# Patient Record
Sex: MALE | Race: WHITE | NOT HISPANIC OR LATINO | Employment: OTHER | URBAN - NONMETROPOLITAN AREA
[De-identification: names, ages, dates, MRNs, and addresses within clinical notes are randomized per-mention and may not be internally consistent; named-entity substitution may affect disease eponyms.]

---

## 2017-10-26 ENCOUNTER — APPOINTMENT (OUTPATIENT)
Dept: CT IMAGING | Facility: HOSPITAL | Age: 82
End: 2017-10-26

## 2017-10-26 ENCOUNTER — HOSPITAL ENCOUNTER (EMERGENCY)
Facility: HOSPITAL | Age: 82
Discharge: HOME | End: 2017-10-26
Admitting: EMERGENCY MEDICINE

## 2017-10-26 VITALS
WEIGHT: 160 LBS | DIASTOLIC BLOOD PRESSURE: 68 MMHG | RESPIRATION RATE: 16 BRPM | OXYGEN SATURATION: 98 % | HEART RATE: 74 BPM | TEMPERATURE: 98 F | BODY MASS INDEX: 24.25 KG/M2 | SYSTOLIC BLOOD PRESSURE: 90 MMHG | HEIGHT: 68 IN

## 2017-10-26 DIAGNOSIS — N39.0 URINARY TRACT INFECTION WITH HEMATURIA, SITE UNSPECIFIED: Primary | ICD-10-CM

## 2017-10-26 DIAGNOSIS — R31.9 URINARY TRACT INFECTION WITH HEMATURIA, SITE UNSPECIFIED: Primary | ICD-10-CM

## 2017-10-26 DIAGNOSIS — K59.00 CONSTIPATION, UNSPECIFIED CONSTIPATION TYPE: ICD-10-CM

## 2017-10-26 LAB
ALBUMIN SERPL-MCNC: 3.5 G/DL (ref 3.5–5)
ALBUMIN/GLOB SERPL: 1.2 G/DL (ref 1.1–2.5)
ALP SERPL-CCNC: 69 U/L (ref 24–120)
ALT SERPL W P-5'-P-CCNC: 19 U/L (ref 0–54)
AMYLASE SERPL-CCNC: 31 U/L (ref 30–110)
ANION GAP SERPL CALCULATED.3IONS-SCNC: 13 MMOL/L (ref 4–13)
AST SERPL-CCNC: 17 U/L (ref 7–45)
BACTERIA UR QL AUTO: ABNORMAL /HPF
BASOPHILS # BLD AUTO: 0.02 10*3/MM3 (ref 0–0.2)
BASOPHILS NFR BLD AUTO: 0.2 % (ref 0–2)
BILIRUB SERPL-MCNC: 1.3 MG/DL (ref 0.1–1)
BILIRUB UR QL STRIP: NEGATIVE
BUN BLD-MCNC: 16 MG/DL (ref 5–21)
BUN/CREAT SERPL: 16.3 (ref 7–25)
CALCIUM SPEC-SCNC: 9.2 MG/DL (ref 8.4–10.4)
CHLORIDE SERPL-SCNC: 97 MMOL/L (ref 98–110)
CLARITY UR: CLEAR
CO2 SERPL-SCNC: 24 MMOL/L (ref 24–31)
COLOR UR: YELLOW
CREAT BLD-MCNC: 0.98 MG/DL (ref 0.5–1.4)
DEPRECATED RDW RBC AUTO: 41.4 FL (ref 40–54)
EOSINOPHIL # BLD AUTO: 0.01 10*3/MM3 (ref 0–0.7)
EOSINOPHIL NFR BLD AUTO: 0.1 % (ref 0–4)
ERYTHROCYTE [DISTWIDTH] IN BLOOD BY AUTOMATED COUNT: 13 % (ref 12–15)
GFR SERPL CREATININE-BSD FRML MDRD: 73 ML/MIN/1.73
GLOBULIN UR ELPH-MCNC: 2.9 GM/DL
GLUCOSE BLD-MCNC: 289 MG/DL (ref 70–100)
GLUCOSE UR STRIP-MCNC: ABNORMAL MG/DL
HCT VFR BLD AUTO: 36.5 % (ref 40–52)
HGB BLD-MCNC: 12.4 G/DL (ref 14–18)
HGB UR QL STRIP.AUTO: ABNORMAL
HYALINE CASTS UR QL AUTO: ABNORMAL /LPF
IMM GRANULOCYTES # BLD: 0.02 10*3/MM3 (ref 0–0.03)
IMM GRANULOCYTES NFR BLD: 0.2 % (ref 0–5)
KETONES UR QL STRIP: ABNORMAL
LEUKOCYTE ESTERASE UR QL STRIP.AUTO: ABNORMAL
LIPASE SERPL-CCNC: 27 U/L (ref 23–203)
LYMPHOCYTES # BLD AUTO: 1.06 10*3/MM3 (ref 0.72–4.86)
LYMPHOCYTES NFR BLD AUTO: 12.2 % (ref 15–45)
MCH RBC QN AUTO: 29.5 PG (ref 28–32)
MCHC RBC AUTO-ENTMCNC: 34 G/DL (ref 33–36)
MCV RBC AUTO: 86.7 FL (ref 82–95)
MONOCYTES # BLD AUTO: 0.84 10*3/MM3 (ref 0.19–1.3)
MONOCYTES NFR BLD AUTO: 9.7 % (ref 4–12)
NEUTROPHILS # BLD AUTO: 6.73 10*3/MM3 (ref 1.87–8.4)
NEUTROPHILS NFR BLD AUTO: 77.6 % (ref 39–78)
NITRITE UR QL STRIP: NEGATIVE
PH UR STRIP.AUTO: 6 [PH] (ref 5–8)
PLATELET # BLD AUTO: 150 10*3/MM3 (ref 130–400)
PMV BLD AUTO: 11.3 FL (ref 6–12)
POTASSIUM BLD-SCNC: 3 MMOL/L (ref 3.5–5.3)
PROT SERPL-MCNC: 6.4 G/DL (ref 6.3–8.7)
PROT UR QL STRIP: ABNORMAL
RBC # BLD AUTO: 4.21 10*6/MM3 (ref 4.8–5.9)
RBC # UR: ABNORMAL /HPF
REF LAB TEST METHOD: ABNORMAL
SODIUM BLD-SCNC: 134 MMOL/L (ref 135–145)
SP GR UR STRIP: 1.01 (ref 1–1.03)
SQUAMOUS #/AREA URNS HPF: ABNORMAL /HPF
UROBILINOGEN UR QL STRIP: ABNORMAL
WBC NRBC COR # BLD: 8.68 10*3/MM3 (ref 4.8–10.8)
WBC UR QL AUTO: ABNORMAL /HPF

## 2017-10-26 PROCEDURE — 96365 THER/PROPH/DIAG IV INF INIT: CPT

## 2017-10-26 PROCEDURE — 80053 COMPREHEN METABOLIC PANEL: CPT | Performed by: NURSE PRACTITIONER

## 2017-10-26 PROCEDURE — 87077 CULTURE AEROBIC IDENTIFY: CPT | Performed by: NURSE PRACTITIONER

## 2017-10-26 PROCEDURE — 87086 URINE CULTURE/COLONY COUNT: CPT | Performed by: NURSE PRACTITIONER

## 2017-10-26 PROCEDURE — 0 IOPAMIDOL 61 % SOLUTION: Performed by: NURSE PRACTITIONER

## 2017-10-26 PROCEDURE — 81001 URINALYSIS AUTO W/SCOPE: CPT | Performed by: NURSE PRACTITIONER

## 2017-10-26 PROCEDURE — 87186 SC STD MICRODIL/AGAR DIL: CPT | Performed by: NURSE PRACTITIONER

## 2017-10-26 PROCEDURE — 82150 ASSAY OF AMYLASE: CPT | Performed by: NURSE PRACTITIONER

## 2017-10-26 PROCEDURE — 74177 CT ABD & PELVIS W/CONTRAST: CPT

## 2017-10-26 PROCEDURE — 85025 COMPLETE CBC W/AUTO DIFF WBC: CPT | Performed by: NURSE PRACTITIONER

## 2017-10-26 PROCEDURE — 99284 EMERGENCY DEPT VISIT MOD MDM: CPT

## 2017-10-26 PROCEDURE — 25010000002 CEFTRIAXONE: Performed by: NURSE PRACTITIONER

## 2017-10-26 PROCEDURE — 87185 SC STD ENZYME DETCJ PER NZM: CPT | Performed by: NURSE PRACTITIONER

## 2017-10-26 PROCEDURE — 83690 ASSAY OF LIPASE: CPT | Performed by: NURSE PRACTITIONER

## 2017-10-26 PROCEDURE — 87147 CULTURE TYPE IMMUNOLOGIC: CPT | Performed by: NURSE PRACTITIONER

## 2017-10-26 RX ORDER — CIPROFLOXACIN 500 MG/1
500 TABLET, FILM COATED ORAL EVERY 12 HOURS
Qty: 10 TABLET | Refills: 0 | Status: SHIPPED | OUTPATIENT
Start: 2017-10-26 | End: 2017-10-31

## 2017-10-26 RX ORDER — POLYETHYLENE GLYCOL 3350 17 G/17G
17 POWDER, FOR SOLUTION ORAL DAILY PRN
Qty: 119 G | Refills: 0 | Status: SHIPPED | OUTPATIENT
Start: 2017-10-26

## 2017-10-26 RX ORDER — POTASSIUM CHLORIDE 750 MG/1
20 CAPSULE, EXTENDED RELEASE ORAL ONCE
Status: COMPLETED | OUTPATIENT
Start: 2017-10-26 | End: 2017-10-26

## 2017-10-26 RX ORDER — POTASSIUM CHLORIDE 20 MEQ/1
20 TABLET, EXTENDED RELEASE ORAL ONCE
Status: DISCONTINUED | OUTPATIENT
Start: 2017-10-26 | End: 2017-10-26 | Stop reason: CLARIF

## 2017-10-26 RX ADMIN — IOPAMIDOL 100 ML: 612 INJECTION, SOLUTION INTRAVENOUS at 16:19

## 2017-10-26 RX ADMIN — CEFTRIAXONE 1 G: 1 INJECTION, POWDER, FOR SOLUTION INTRAMUSCULAR; INTRAVENOUS at 17:13

## 2017-10-26 RX ADMIN — POTASSIUM CHLORIDE 20 MEQ: 750 CAPSULE, EXTENDED RELEASE ORAL at 17:11

## 2017-10-26 NOTE — ED NOTES
Per pts family pt was up complaining of abd pain early this morning. PT has not had a BM in 4 days per wife. During assessment pts abd is not tender to touch and pt has no complaints.      Alejandra Wu RN  10/26/17 8565

## 2017-10-26 NOTE — ED PROVIDER NOTES
Subjective   Patient is a 82 y.o. male presenting with abdominal pain.   Abdominal Pain   Pain location:  LLQ  Pain quality: sharp    Pain quality comment:  Pain intermittent for a few days; reports much worse pain last night, currently improved, hx of constipation  Pain severity:  Moderate  Onset quality:  Gradual  Timing:  Intermittent  Progression:  Improving  Chronicity:  New  Context: not recent illness, not retching, not sick contacts and not suspicious food intake    Relieved by:  None tried  Worsened by:  Nothing  Ineffective treatments:  None tried  Associated symptoms: constipation    Associated symptoms: no chills, no diarrhea, no dysuria, no fatigue, no fever, no flatus, no melena, no shortness of breath, no sore throat and no vomiting    Risk factors: being elderly    Risk factors: no recent hospitalization        Review of Systems   Constitutional: Negative for appetite change, chills, fatigue and fever.   HENT: Negative for congestion and sore throat.    Respiratory: Negative for chest tightness and shortness of breath.    Cardiovascular: Negative for palpitations.   Gastrointestinal: Positive for abdominal pain and constipation. Negative for abdominal distention, diarrhea, flatus, melena and vomiting.   Genitourinary: Negative for difficulty urinating and dysuria.   Musculoskeletal: Negative for back pain, joint swelling, neck pain and neck stiffness.   Skin: Negative for rash.   Neurological: Negative for dizziness and headaches.   All other systems reviewed and are negative.      Past Medical History:   Diagnosis Date   • Diabetes mellitus    • Hypertension    • Myocardial infarction    • Stroke        No Known Allergies    Past Surgical History:   Procedure Laterality Date   • BACK SURGERY     • CARDIAC DEFIBRILLATOR PLACEMENT         History reviewed. No pertinent family history.    Social History     Social History   • Marital status:      Spouse name: N/A   • Number of children: N/A   •  "Years of education: N/A     Social History Main Topics   • Smoking status: Never Smoker   • Smokeless tobacco: None   • Alcohol use No   • Drug use: No   • Sexual activity: Defer     Other Topics Concern   • None     Social History Narrative   • None       Prior to Admission medications    Medication Sig Start Date End Date Taking? Authorizing Provider   acetaminophen (TYLENOL) 500 MG tablet Every 6 (Six) Hours.    Historical Provider, MD   aspirin 81 MG EC tablet Take 1 tablet by mouth daily. 12/18/12   Historical Provider, MD   carvedilol (COREG) 6.25 MG tablet Take 1 tablet by mouth 2 times daily (with meals). 6/26/12   Historical Provider, MD   esomeprazole (NexIUM) 40 MG packet Take 40 mg by mouth daily. Takes it as needed prn.    Historical Provider, MD   furosemide (LASIX) 40 MG tablet Take 40 mg by mouth 3 times daily.      Historical Provider, MD   HYDROcodone-acetaminophen (VICODIN) 7.5-500 MG per tablet Every 6 (Six) Hours. 5/10/13   Historical Provider, MD   insulin NPH-insulin regular (HUMULIN 70/30) (70-30) 100 UNIT/ML injection Inject  into the skin 2 times daily.      Historical Provider, MD   nitroglycerin (NITROSTAT) 0.4 MG SL tablet Place 0.4 mg under the tongue every 5 minutes as needed.      Historical Provider, MD   potassium chloride (K-DUR,KLOR-CON) 20 MEQ CR tablet Take 20 mEq by mouth daily. 5/24/13   Historical Provider, MD       Medications   iopamidol (ISOVUE-300) 61 % injection 100 mL (100 mL Intravenous Given 10/26/17 1619)   cefTRIAXone (ROCEPHIN) 1 g/100 mL 0.9% NS (MBP) (0 g Intravenous Stopped 10/26/17 1801)   potassium chloride (MICRO-K) CR capsule 20 mEq (20 mEq Oral Given 10/26/17 1711)       BP 90/68  Pulse 74  Temp 98 °F (36.7 °C) (Oral)   Resp 16  Ht 68\" (172.7 cm)  Wt 160 lb (72.6 kg)  SpO2 98%  BMI 24.33 kg/m2      Objective   Physical Exam   Constitutional: He appears well-developed and well-nourished.   HENT:   Head: Normocephalic and atraumatic.   Right Ear: " External ear normal.   Left Ear: External ear normal.   Nose: Nose normal.   Mouth/Throat: Oropharynx is clear and moist.   Eyes: Conjunctivae and EOM are normal. Pupils are equal, round, and reactive to light.   Neck: Normal range of motion. Neck supple.   Cardiovascular: Normal rate, regular rhythm, normal heart sounds and intact distal pulses.    Pulmonary/Chest: Effort normal and breath sounds normal.   Abdominal: Soft. Bowel sounds are normal. There is tenderness.   Mild pain to palpation to the left lower quadrant without any guarding or rebound tenderness noted; bowel sounds noted to all 4 quadrants.    Musculoskeletal: Normal range of motion.   Neurological: He is alert.   Skin: Skin is warm and dry.   Psychiatric: He has a normal mood and affect. His behavior is normal. Judgment and thought content normal.   Nursing note and vitals reviewed.      Procedures         Lab Results (last 24 hours)     Procedure Component Value Units Date/Time    CBC & Differential [[id number]] Collected:  10/26/17 1500    Specimen:  Blood Updated:  10/26/17 1507    Narrative:       The following orders were created for panel order CBC & Differential.  Procedure                               Abnormality         Status                     ---------                               -----------         ------                     CBC Auto Differential[[id number]]         Abnormal            Final result                 Please view results for these tests on the individual orders.    Comprehensive Metabolic Panel [[id number]]  (Abnormal) Collected:  10/26/17 1500    Specimen:  Blood Updated:  10/26/17 1518     Glucose 289 (H) mg/dL      BUN 16 mg/dL      Creatinine 0.98 mg/dL      Sodium 134 (L) mmol/L      Potassium 3.0 (L) mmol/L      Chloride 97 (L) mmol/L      CO2 24.0 mmol/L      Calcium 9.2 mg/dL      Total Protein 6.4 g/dL      Albumin 3.50 g/dL      ALT (SGPT) 19 U/L      AST (SGOT) 17 U/L      Alkaline Phosphatase 69 U/L      Total  Bilirubin 1.3 (H) mg/dL      eGFR Non African Amer 73 mL/min/1.73      Globulin 2.9 gm/dL      A/G Ratio 1.2 g/dL      BUN/Creatinine Ratio 16.3     Anion Gap 13.0 mmol/L     Narrative:       The MDRD GFR formula is only valid for adults with stable renal function between ages 18 and 70.    Lipase [[id number]]  (Normal) Collected:  10/26/17 1500    Specimen:  Blood Updated:  10/26/17 1518     Lipase 27 U/L     Amylase [[id number]]  (Normal) Collected:  10/26/17 1500    Specimen:  Blood Updated:  10/26/17 1518     Amylase 31 U/L     CBC Auto Differential [[id number]]  (Abnormal) Collected:  10/26/17 1500    Specimen:  Blood Updated:  10/26/17 1507     WBC 8.68 10*3/mm3      RBC 4.21 (L) 10*6/mm3      Hemoglobin 12.4 (L) g/dL      Hematocrit 36.5 (L) %      MCV 86.7 fL      MCH 29.5 pg      MCHC 34.0 g/dL      RDW 13.0 %      RDW-SD 41.4 fl      MPV 11.3 fL      Platelets 150 10*3/mm3      Neutrophil % 77.6 %      Lymphocyte % 12.2 (L) %      Monocyte % 9.7 %      Eosinophil % 0.1 %      Basophil % 0.2 %      Immature Grans % 0.2 %      Neutrophils, Absolute 6.73 10*3/mm3      Lymphocytes, Absolute 1.06 10*3/mm3      Monocytes, Absolute 0.84 10*3/mm3      Eosinophils, Absolute 0.01 10*3/mm3      Basophils, Absolute 0.02 10*3/mm3      Immature Grans, Absolute 0.02 10*3/mm3     Urinalysis With / Culture If Indicated - Urine, Clean Catch [[id number]]  (Abnormal) Collected:  10/26/17 1605    Specimen:  Urine from Urine, Clean Catch Updated:  10/26/17 1622     Color, UA Yellow     Appearance, UA Clear     pH, UA 6.0     Specific Gravity, UA 1.013     Glucose,  mg/dL (2+) (A)     Ketones, UA 15 mg/dL (1+) (A)     Bilirubin, UA Negative     Blood, UA Small (1+) (A)     Protein, UA 30 mg/dL (1+) (A)     Leuk Esterase, UA Moderate (2+) (A)     Nitrite, UA Negative     Urobilinogen, UA 2.0 E.U./dL (A)    Urine Culture - Urine, Urine, Clean Catch [[id number]] Collected:  10/26/17 1605    Specimen:  Urine from Urine, Clean Catch  Updated:  10/26/17 1622    Urinalysis, Microscopic Only - Urine, Clean Catch [[id number]]  (Abnormal) Collected:  10/26/17 1605    Specimen:  Urine from Urine, Clean Catch Updated:  10/26/17 1630     RBC, UA 3-5 (A) /HPF      WBC, UA Too Numerous to Count (A) /HPF      Bacteria, UA None Seen /HPF      Squamous Epithelial Cells, UA 0-2 /HPF      Hyaline Casts, UA None Seen /LPF      Methodology Automated Microscopy          CT Abdomen Pelvis With Contrast   Final Result   1. Diffuse urinary bladder wall thickening could be due to cystitis.   2. Moderate to marked atherosclerosis in the aorta and iliac arteries.   3. Cardiomegaly.   This report was finalized on 10/26/2017 16:48 by Dr. Kaden Casanova MD.            ED Course  ED Course   Comment By Time   Wife states Dr. Moscoso called in prescription for potassium and she plans to pick the medication up tomorrow. Hope Lucero, APRN 10/26 1711   Patient was administered antibiotics as well as kdur po. We prescribed antibiotics as well as miralax for complaints of constipation. He has a prescription at the pharmacy for potassium not yet picked up. Patient will need to f/u with pcp and/or return with any acute or worsneing sxs. José Antonio has had no vomiting or fevers with the complaints of abdominal discomfort.        MDM  Number of Diagnoses or Management Options  Constipation, unspecified constipation type: new and requires workup  Urinary tract infection with hematuria, site unspecified: new and requires workup     Amount and/or Complexity of Data Reviewed  Clinical lab tests: ordered and reviewed  Tests in the radiology section of CPT®: ordered and reviewed  Obtain history from someone other than the patient: yes  Discuss the patient with other providers: yes    Risk of Complications, Morbidity, and/or Mortality  Presenting problems: moderate  Diagnostic procedures: moderate  Management options: moderate    Patient Progress  Patient progress: improved      Final  diagnoses:   Urinary tract infection with hematuria, site unspecified   Constipation, unspecified constipation type          Hope Lucero, APRN  10/26/17 0926

## 2017-10-29 LAB
BACTERIA SPEC AEROBE CULT: ABNORMAL
BACTERIA SPEC AEROBE CULT: ABNORMAL

## 2018-01-01 ENCOUNTER — TELEPHONE (OUTPATIENT)
Dept: INTERNAL MEDICINE CLINIC | Age: 83
End: 2018-01-01

## 2018-03-09 ENCOUNTER — APPOINTMENT (OUTPATIENT)
Dept: CT IMAGING | Facility: HOSPITAL | Age: 83
End: 2018-03-09

## 2018-03-09 ENCOUNTER — APPOINTMENT (OUTPATIENT)
Dept: GENERAL RADIOLOGY | Facility: HOSPITAL | Age: 83
End: 2018-03-09

## 2018-03-09 ENCOUNTER — HOSPITAL ENCOUNTER (EMERGENCY)
Facility: HOSPITAL | Age: 83
Discharge: HOME | End: 2018-03-10
Attending: EMERGENCY MEDICINE | Admitting: EMERGENCY MEDICINE
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
## 2018-03-09 LAB
ANION GAP SERPL CALCULATED.3IONS-SCNC: 15 MMOL/L (ref 4–13)
APTT PPP: 25.8 SECONDS (ref 24.1–34.8)
BASOPHILS # BLD AUTO: 0.04 10*3/MM3 (ref 0–0.2)
BASOPHILS NFR BLD AUTO: 0.6 % (ref 0–2)
BILIRUB UR QL STRIP: NEGATIVE
BUN BLD-MCNC: 17 MG/DL (ref 5–21)
BUN/CREAT SERPL: 21.8 (ref 7–25)
CALCIUM SPEC-SCNC: 9.7 MG/DL (ref 8.4–10.4)
CHLORIDE SERPL-SCNC: 95 MMOL/L (ref 98–110)
CK MB SERPL-CCNC: 1.48 NG/ML (ref 0–5)
CLARITY UR: CLEAR
CO2 SERPL-SCNC: 25 MMOL/L (ref 24–31)
COLOR UR: YELLOW
CREAT BLD-MCNC: 0.78 MG/DL (ref 0.5–1.4)
DEPRECATED RDW RBC AUTO: 39.4 FL (ref 40–54)
EOSINOPHIL # BLD AUTO: 0.05 10*3/MM3 (ref 0–0.7)
EOSINOPHIL NFR BLD AUTO: 0.7 % (ref 0–4)
ERYTHROCYTE [DISTWIDTH] IN BLOOD BY AUTOMATED COUNT: 12.8 % (ref 12–15)
GFR SERPL CREATININE-BSD FRML MDRD: 96 ML/MIN/1.73
GLUCOSE BLD-MCNC: 374 MG/DL (ref 70–100)
GLUCOSE UR STRIP-MCNC: ABNORMAL MG/DL
HCT VFR BLD AUTO: 38 % (ref 40–52)
HGB BLD-MCNC: 13.1 G/DL (ref 14–18)
HGB UR QL STRIP.AUTO: NEGATIVE
IMM GRANULOCYTES # BLD: 0.02 10*3/MM3 (ref 0–0.03)
IMM GRANULOCYTES NFR BLD: 0.3 % (ref 0–5)
INR PPP: 1.04 (ref 0.91–1.09)
KETONES UR QL STRIP: ABNORMAL
LEUKOCYTE ESTERASE UR QL STRIP.AUTO: NEGATIVE
LYMPHOCYTES # BLD AUTO: 1.79 10*3/MM3 (ref 0.72–4.86)
LYMPHOCYTES NFR BLD AUTO: 26 % (ref 15–45)
MAGNESIUM SERPL-MCNC: 1.8 MG/DL (ref 1.4–2.2)
MCH RBC QN AUTO: 29.4 PG (ref 28–32)
MCHC RBC AUTO-ENTMCNC: 34.5 G/DL (ref 33–36)
MCV RBC AUTO: 85.2 FL (ref 82–95)
MONOCYTES # BLD AUTO: 0.44 10*3/MM3 (ref 0.19–1.3)
MONOCYTES NFR BLD AUTO: 6.4 % (ref 4–12)
NEUTROPHILS # BLD AUTO: 4.54 10*3/MM3 (ref 1.87–8.4)
NEUTROPHILS NFR BLD AUTO: 66 % (ref 39–78)
NITRITE UR QL STRIP: NEGATIVE
NRBC BLD MANUAL-RTO: 0 /100 WBC (ref 0–0)
NT-PROBNP SERPL-MCNC: 2570 PG/ML (ref 0–1800)
PH UR STRIP.AUTO: 5.5 [PH] (ref 5–8)
PHOSPHATE SERPL-MCNC: 3.6 MG/DL (ref 2.5–4.5)
PLATELET # BLD AUTO: 167 10*3/MM3 (ref 130–400)
PMV BLD AUTO: 10.7 FL (ref 6–12)
POTASSIUM BLD-SCNC: 3.5 MMOL/L (ref 3.5–5.3)
PROT UR QL STRIP: NEGATIVE
PROTHROMBIN TIME: 13.9 SECONDS (ref 11.9–14.6)
RBC # BLD AUTO: 4.46 10*6/MM3 (ref 4.8–5.9)
SODIUM BLD-SCNC: 135 MMOL/L (ref 135–145)
SP GR UR STRIP: >1.03 (ref 1–1.03)
TROPONIN I SERPL-MCNC: 0.05 NG/ML (ref 0–0.03)
UROBILINOGEN UR QL STRIP: ABNORMAL
WBC NRBC COR # BLD: 6.88 10*3/MM3 (ref 4.8–10.8)

## 2018-03-09 PROCEDURE — 85730 THROMBOPLASTIN TIME PARTIAL: CPT | Performed by: EMERGENCY MEDICINE

## 2018-03-09 PROCEDURE — 83735 ASSAY OF MAGNESIUM: CPT | Performed by: EMERGENCY MEDICINE

## 2018-03-09 PROCEDURE — 81003 URINALYSIS AUTO W/O SCOPE: CPT | Performed by: EMERGENCY MEDICINE

## 2018-03-09 PROCEDURE — 85025 COMPLETE CBC W/AUTO DIFF WBC: CPT | Performed by: EMERGENCY MEDICINE

## 2018-03-09 PROCEDURE — 99283 EMERGENCY DEPT VISIT LOW MDM: CPT

## 2018-03-09 PROCEDURE — 82553 CREATINE MB FRACTION: CPT | Performed by: EMERGENCY MEDICINE

## 2018-03-09 PROCEDURE — 71046 X-RAY EXAM CHEST 2 VIEWS: CPT

## 2018-03-09 PROCEDURE — 70450 CT HEAD/BRAIN W/O DYE: CPT

## 2018-03-09 PROCEDURE — 93005 ELECTROCARDIOGRAM TRACING: CPT | Performed by: EMERGENCY MEDICINE

## 2018-03-09 PROCEDURE — 80048 BASIC METABOLIC PNL TOTAL CA: CPT | Performed by: EMERGENCY MEDICINE

## 2018-03-09 PROCEDURE — 84484 ASSAY OF TROPONIN QUANT: CPT | Performed by: EMERGENCY MEDICINE

## 2018-03-09 PROCEDURE — 83880 ASSAY OF NATRIURETIC PEPTIDE: CPT | Performed by: EMERGENCY MEDICINE

## 2018-03-09 PROCEDURE — 73562 X-RAY EXAM OF KNEE 3: CPT

## 2018-03-09 PROCEDURE — 85610 PROTHROMBIN TIME: CPT | Performed by: EMERGENCY MEDICINE

## 2018-03-09 PROCEDURE — 73030 X-RAY EXAM OF SHOULDER: CPT

## 2018-03-09 PROCEDURE — 84100 ASSAY OF PHOSPHORUS: CPT | Performed by: EMERGENCY MEDICINE

## 2018-03-09 NOTE — ED NOTES
"Pt to ER for recent multiple falls.  Pt fell last week injuring right knee.  Pt fell 2 days ago hitting defib site.  Pt denies any pain presently, pt was painful at time of fall.  Pt states \"I just fall.\"      Jeniffer Stein RN  03/09/18 1532    "

## 2018-03-10 VITALS
HEART RATE: 71 BPM | BODY MASS INDEX: 22.4 KG/M2 | TEMPERATURE: 97.5 F | DIASTOLIC BLOOD PRESSURE: 61 MMHG | RESPIRATION RATE: 17 BRPM | HEIGHT: 71 IN | SYSTOLIC BLOOD PRESSURE: 165 MMHG | OXYGEN SATURATION: 100 % | WEIGHT: 160 LBS

## 2018-03-10 LAB — TROPONIN I SERPL-MCNC: 0.05 NG/ML (ref 0–0.03)

## 2018-03-10 PROCEDURE — 93005 ELECTROCARDIOGRAM TRACING: CPT | Performed by: EMERGENCY MEDICINE

## 2018-03-10 PROCEDURE — 93010 ELECTROCARDIOGRAM REPORT: CPT | Performed by: INTERNAL MEDICINE

## 2018-03-10 PROCEDURE — 84484 ASSAY OF TROPONIN QUANT: CPT | Performed by: EMERGENCY MEDICINE

## 2018-03-10 NOTE — ED NOTES
See paper charting for further information due to computer down time and upgrade     Carli Celeste RN  03/10/18 0051

## 2018-03-10 NOTE — ED PROVIDER NOTES
"Subjective   83 y/o demented male who arrives with family for evaluation of falls. State patient has had 4-5 falls over the past month for which they have not seen their PMD for. State he had [employer] defib implanted in 2006 and that he will often place his hand over this area over the past 2-3 months and family arrive asking that \"this get checked out\" as they are not sure \"if it is shocking him because they reduced the shock so it wouldn't knock him over.\" They do state they do plug in the device nightly and have not been called by the company for any shocks. They note that the falls are without LOC but he has struck his head. They state further the patient is acting his normal self and deny again any weakness, fevers, vomiting or medication changes. The family endorses that they are not concerned about the falls as he has fallen now for several months but are concerned as he seems to, some times, hold his right hand over his pacemaker and the family is concerned that it may be discharging. They state further that the falls appear to be when his legs give out and deny he is unsteady or has multiple falls each week. He does use a walker and his elderly wife also assist him as well. The patient arrives alert but not oriented which his baseline for the family. He denies all issues to me but family endorses they would like his knee xrayed as well.       Patient is a 82 y.o. male presenting with fall.   Fall   Associated symptoms: no headaches        Review of Systems   Unable to perform ROS: Dementia   Neurological: Negative for dizziness, weakness, light-headedness and headaches.   All other systems reviewed and are negative.      Past Medical History:   Diagnosis Date   • Diabetes mellitus    • Hypertension    • Myocardial infarction    • Stroke        No Known Allergies    Past Surgical History:   Procedure Laterality Date   • BACK SURGERY     • CARDIAC DEFIBRILLATOR PLACEMENT         History reviewed. No " pertinent family history.    Social History     Social History   • Marital status:      Social History Main Topics   • Smoking status: Never Smoker   • Alcohol use No   • Drug use: No   • Sexual activity: Defer     Other Topics Concern   • Not on file           Objective   Physical Exam   Constitutional: He appears well-developed.   HENT:   Head: Normocephalic.   Nose: Nose normal.   Eyes: Conjunctivae are normal. Pupils are equal, round, and reactive to light.   Neck: Normal range of motion. Neck supple.   Cardiovascular: Normal rate, regular rhythm, normal heart sounds and intact distal pulses.    Pulmonary/Chest: Effort normal and breath sounds normal.   Abdominal: Soft. Bowel sounds are normal.   Musculoskeletal: Normal range of motion. He exhibits no edema or tenderness.   No effusions, no abrasions, no signs of trauma, raphael is negative, varus and valgus straining are normal, no laxity is noted.     Pelvis is stable, no pain on palpation of all extremities, rom is intact in all extremities.    Neurological: He is alert. He displays normal reflexes. No cranial nerve deficit. Coordination normal.   Skin: Skin is warm.   Vitals reviewed.      ECG 12 Lead    Date/Time: 3/9/2018 6:03 AM  Performed by: KEYONNA BANGURA  Authorized by: KEYONNA BANGURA   Rhythm: paced  BPM: 64      ECG 12 Lead    Date/Time: 3/10/2018 6:03 AM  Performed by: KEYONNA BANGURA  Authorized by: KEYONNA BANGURA   Rhythm: paced  BPM: 69                 ED Course  ED Course      SCANNED - IMAGING   Final Result      CT Head Without Contrast   Final Result   Changes of aging with no acute intracranial abnormality   This report was finalized on 03/10/2018 07:00 by Dr. James Gilbert MD.      XR Shoulder 2+ View Right   ED Interpretation   No acute fracture      Final Result      XR Chest 2 View   Final Result   . No acute disease.   This report was finalized on 03/09/2018 20:46 by Dr. Yovany Blake MD.      XR  Knee 3 View Right   Final Result   1.. Calcification of the medial and lateral menisci. The joint space is   otherwise well preserved.   2. No fracture or joint effusion.   This report was finalized on 03/09/2018 20:46 by Dr. Yovany Blake MD.        Labs Reviewed   BASIC METABOLIC PANEL - Abnormal; Notable for the following:        Result Value    Glucose 374 (*)     Chloride 95 (*)     Anion Gap 15.0 (*)     All other components within normal limits    Narrative:     The MDRD GFR formula is only valid for adults with stable renal function between ages 18 and 70.   BNP (IN-HOUSE) - Abnormal; Notable for the following:     proBNP 2,570.0 (*)     All other components within normal limits   URINALYSIS W/ MICROSCOPIC IF INDICATED - Abnormal; Notable for the following:     Specific Gravity, UA >1.030 (*)     Glucose, UA >=1000 mg/dL (3+) (*)     Ketones, UA 80 mg/dL (3+) (*)     All other components within normal limits    Narrative:     Urine microscopic not indicated.   TROPONIN (IN-HOUSE) - Abnormal; Notable for the following:     Troponin I 0.047 (*)     All other components within normal limits   CBC WITH AUTO DIFFERENTIAL - Abnormal; Notable for the following:     RBC 4.46 (*)     Hemoglobin 13.1 (*)     Hematocrit 38.0 (*)     RDW-SD 39.4 (*)     All other components within normal limits   TROPONIN (IN-HOUSE) - Abnormal; Notable for the following:     Troponin I 0.046 (*)     All other components within normal limits   PROTIME-INR - Normal   APTT - Normal   CK MB - Normal    Narrative:     CKMB Index not indicated   PHOSPHORUS - Normal   MAGNESIUM - Normal   CBC AND DIFFERENTIAL    Narrative:     The following orders were created for panel order CBC & Differential.  Procedure                               Abnormality         Status                     ---------                               -----------         ------                     CBC Auto Differential[[id number]]        Abnormal            Final result                  Please view results for these tests on the individual orders.                 MDM  Number of Diagnoses or Management Options  Diagnosis management comments: SCANNED - IMAGING   Final Result     CT Head Without Contrast   Final Result    Changes of aging with no acute intracranial abnormality    This report was finalized on 03/10/2018 07:00 by Dr. James Gilbert MD.     XR Shoulder 2+ View Right   ED Interpretation    No acute fracture      Final Result     XR Chest 2 View   Final Result    . No acute disease.    This report was finalized on 03/09/2018 20:46 by Dr. Yovany Blake MD.     XR Knee 3 View Right   Final Result    1.. Calcification of the medial and lateral menisci. The joint space is    otherwise well preserved.    2. No fracture or joint effusion.    This report was finalized on 03/09/2018 20:46 by Dr. Yovany Blake Md.  Labs Reviewed  BASIC METABOLIC PANEL - Abnormal; Notable for the following:      Glucose                       374 (*)                Chloride                      95 (*)                 Anion Gap                     15.0 (*)            All other components within normal limits         Narrative: The MDRD GFR formula is only valid for adults with stable renal function between ages 18 and 70.  BNP (IN-HOUSE) - Abnormal; Notable for the following:      proBNP                        2,570.0 (*)            All other components within normal limits  URINALYSIS W/ MICROSCOPIC IF INDICATED - Abnormal; Notable for the following:      Specific Gravity, UA          >1.030 (*)               Glucose, UA                     (*)                  Ketones, UA                     (*)               All other components within normal limits         Narrative: Urine microscopic not indicated.  TROPONIN (IN-HOUSE) - Abnormal; Notable for the following:      Troponin I                    0.047 (*)            All other components within normal limits  CBC WITH AUTO DIFFERENTIAL -  "Abnormal; Notable for the following:      RBC                           4.46 (*)               Hemoglobin                    13.1 (*)               Hematocrit                    38.0 (*)               RDW-SD                        39.4 (*)            All other components within normal limits  TROPONIN (IN-HOUSE) - Abnormal; Notable for the following:      Troponin I                    0.046 (*)            All other components within normal limits  PROTIME-INR - Normal  APTT - Normal  CK MB - Normal         Narrative: CKMB Index not indicated  PHOSPHORUS - Normal  MAGNESIUM - Normal  CBC AND DIFFERENTIAL      Delay in discharge as computer system went down and we were unable to send the interrogation report to [employer]. We attempted to contact them several times without success. Thus, I did check his CE and EKG x2 which were unchanged. His trop appears to always be elevated and did not change significantly during his stay. I offered the family admission vs dc at that time but they elected to go home stating they would contact [facility] In the morning. I do not feel the patient was being shocked but a history from him secondary to his dementia was very difficult. I did endorse to the patient and the family my concern for his falls. I am concerned that his falls at home with his elderly wife may lead to injury in the future. I have asked that they speak to his pmd about either home health, PT or further options such as placement. The family did not wish to talk about placement and state they feel the patient and his wife are \"safe\"at home.\"             Amount and/or Complexity of Data Reviewed  Clinical lab tests: ordered and reviewed  Tests in the radiology section of CPT®: ordered and reviewed  Obtain history from someone other than the patient: yes  Review and summarize past medical records: yes  Independent visualization of images, tracings, or specimens: yes        Final diagnoses:   Fall, initial encounter "            Wenceslao Barahona MD  03/12/18 0605

## 2018-03-10 NOTE — ED NOTES
Sending interrogation information has been attempted several more times with fail message each time. Family and MD made aware. Patient given a sandwich and drink with Dr Barahona's permission. MD states no  Dysphagia study needed     Carli Celeste RN  03/09/18 9300

## 2018-03-10 NOTE — ED NOTES
"Phone call returned by [employer]i, technician states we may need to attempted to send information up to 7 times before she receives it. Informed technician we have attempted to send information multiple times without success. She instructed us to \"keep trying\"       Deirdre Caruso RN  03/09/18 6991    "

## 2018-03-10 NOTE — ED NOTES
[employer] called regarding medical Device, awaiting call back from technician     Deirdre Caruso, RN  03/09/18 9130

## 2018-04-11 ENCOUNTER — HOSPITAL ENCOUNTER (INPATIENT)
Facility: HOSPITAL | Age: 83
LOS: 7 days | Discharge: NURSING, REHAB OR LONG-TERM CARE | End: 2018-04-18
Attending: EMERGENCY MEDICINE | Admitting: FAMILY MEDICINE

## 2018-04-11 ENCOUNTER — APPOINTMENT (OUTPATIENT)
Dept: GENERAL RADIOLOGY | Facility: HOSPITAL | Age: 83
End: 2018-04-11

## 2018-04-11 ENCOUNTER — APPOINTMENT (OUTPATIENT)
Dept: CT IMAGING | Facility: HOSPITAL | Age: 83
End: 2018-04-11

## 2018-04-11 DIAGNOSIS — Z74.09 IMPAIRED FUNCTIONAL MOBILITY, BALANCE, GAIT, AND ENDURANCE: ICD-10-CM

## 2018-04-11 DIAGNOSIS — Z78.9 IMPAIRED MOBILITY AND ADLS: ICD-10-CM

## 2018-04-11 DIAGNOSIS — Z74.09 IMPAIRED MOBILITY AND ADLS: ICD-10-CM

## 2018-04-11 DIAGNOSIS — R13.12 OROPHARYNGEAL DYSPHAGIA: ICD-10-CM

## 2018-04-11 DIAGNOSIS — E86.0 DEHYDRATION: Primary | ICD-10-CM

## 2018-04-11 DIAGNOSIS — I95.9 HYPOTENSION, UNSPECIFIED HYPOTENSION TYPE: ICD-10-CM

## 2018-04-11 PROBLEM — J18.9 HEALTHCARE-ASSOCIATED PNEUMONIA: Status: ACTIVE | Noted: 2018-04-11

## 2018-04-11 LAB
ALBUMIN SERPL-MCNC: 3.3 G/DL (ref 3.5–5)
ALBUMIN/GLOB SERPL: 1.1 G/DL (ref 1.1–2.5)
ALP SERPL-CCNC: 70 U/L (ref 24–120)
ALT SERPL W P-5'-P-CCNC: 17 U/L (ref 0–54)
ANION GAP SERPL CALCULATED.3IONS-SCNC: 6 MMOL/L (ref 4–13)
AST SERPL-CCNC: 25 U/L (ref 7–45)
BASOPHILS # BLD AUTO: 0.05 10*3/MM3 (ref 0–0.2)
BASOPHILS NFR BLD AUTO: 0.5 % (ref 0–2)
BILIRUB SERPL-MCNC: 0.7 MG/DL (ref 0.1–1)
BILIRUB UR QL STRIP: ABNORMAL
BUN BLD-MCNC: 27 MG/DL (ref 5–21)
BUN/CREAT SERPL: 23.5 (ref 7–25)
CALCIUM SPEC-SCNC: 9.2 MG/DL (ref 8.4–10.4)
CHLORIDE SERPL-SCNC: 98 MMOL/L (ref 98–110)
CLARITY UR: CLEAR
CO2 SERPL-SCNC: 31 MMOL/L (ref 24–31)
COLOR UR: ABNORMAL
CREAT BLD-MCNC: 1.15 MG/DL (ref 0.5–1.4)
D-LACTATE SERPL-SCNC: 1.4 MMOL/L (ref 0.5–2)
DEPRECATED RDW RBC AUTO: 46.9 FL (ref 40–54)
EOSINOPHIL # BLD AUTO: 0.07 10*3/MM3 (ref 0–0.7)
EOSINOPHIL NFR BLD AUTO: 0.7 % (ref 0–4)
ERYTHROCYTE [DISTWIDTH] IN BLOOD BY AUTOMATED COUNT: 13.9 % (ref 12–15)
GFR SERPL CREATININE-BSD FRML MDRD: 61 ML/MIN/1.73
GLOBULIN UR ELPH-MCNC: 2.9 GM/DL
GLUCOSE BLD-MCNC: 113 MG/DL (ref 70–100)
GLUCOSE BLDC GLUCOMTR-MCNC: 105 MG/DL (ref 70–130)
GLUCOSE BLDC GLUCOMTR-MCNC: 235 MG/DL (ref 70–130)
GLUCOSE UR STRIP-MCNC: ABNORMAL MG/DL
HBA1C MFR BLD: 12.2 %
HCT VFR BLD AUTO: 33 % (ref 40–52)
HGB BLD-MCNC: 10.6 G/DL (ref 14–18)
HGB UR QL STRIP.AUTO: NEGATIVE
IMM GRANULOCYTES # BLD: 0.09 10*3/MM3 (ref 0–0.03)
IMM GRANULOCYTES NFR BLD: 0.8 % (ref 0–5)
KETONES UR QL STRIP: ABNORMAL
L PNEUMO1 AG UR QL IA: NEGATIVE
LEUKOCYTE ESTERASE UR QL STRIP.AUTO: NEGATIVE
LYMPHOCYTES # BLD AUTO: 1.11 10*3/MM3 (ref 0.72–4.86)
LYMPHOCYTES NFR BLD AUTO: 10.3 % (ref 15–45)
MCH RBC QN AUTO: 29.8 PG (ref 28–32)
MCHC RBC AUTO-ENTMCNC: 32.1 G/DL (ref 33–36)
MCV RBC AUTO: 92.7 FL (ref 82–95)
MONOCYTES # BLD AUTO: 0.71 10*3/MM3 (ref 0.19–1.3)
MONOCYTES NFR BLD AUTO: 6.6 % (ref 4–12)
NEUTROPHILS # BLD AUTO: 8.73 10*3/MM3 (ref 1.87–8.4)
NEUTROPHILS NFR BLD AUTO: 81.1 % (ref 39–78)
NITRITE UR QL STRIP: NEGATIVE
NRBC BLD MANUAL-RTO: 0 /100 WBC (ref 0–0)
PH UR STRIP.AUTO: <=5 [PH] (ref 5–8)
PLATELET # BLD AUTO: 210 10*3/MM3 (ref 130–400)
PMV BLD AUTO: 10.6 FL (ref 6–12)
POTASSIUM BLD-SCNC: 4.3 MMOL/L (ref 3.5–5.3)
PROT SERPL-MCNC: 6.2 G/DL (ref 6.3–8.7)
PROT UR QL STRIP: ABNORMAL
RBC # BLD AUTO: 3.56 10*6/MM3 (ref 4.8–5.9)
S PNEUM AG SPEC QL LA: NEGATIVE
SODIUM BLD-SCNC: 135 MMOL/L (ref 135–145)
SP GR UR STRIP: >1.03 (ref 1–1.03)
UROBILINOGEN UR QL STRIP: ABNORMAL
WBC NRBC COR # BLD: 10.76 10*3/MM3 (ref 4.8–10.8)

## 2018-04-11 PROCEDURE — 73630 X-RAY EXAM OF FOOT: CPT

## 2018-04-11 PROCEDURE — 99284 EMERGENCY DEPT VISIT MOD MDM: CPT

## 2018-04-11 PROCEDURE — 74176 CT ABD & PELVIS W/O CONTRAST: CPT

## 2018-04-11 PROCEDURE — 94799 UNLISTED PULMONARY SVC/PX: CPT

## 2018-04-11 PROCEDURE — 25010000002 PIPERACILLIN SOD-TAZOBACTAM PER 1 G: Performed by: NURSE PRACTITIONER

## 2018-04-11 PROCEDURE — 81003 URINALYSIS AUTO W/O SCOPE: CPT | Performed by: EMERGENCY MEDICINE

## 2018-04-11 PROCEDURE — 25010000002 ENOXAPARIN PER 10 MG: Performed by: NURSE PRACTITIONER

## 2018-04-11 PROCEDURE — 82962 GLUCOSE BLOOD TEST: CPT

## 2018-04-11 PROCEDURE — 93005 ELECTROCARDIOGRAM TRACING: CPT | Performed by: EMERGENCY MEDICINE

## 2018-04-11 PROCEDURE — 83036 HEMOGLOBIN GLYCOSYLATED A1C: CPT | Performed by: NURSE PRACTITIONER

## 2018-04-11 PROCEDURE — 94640 AIRWAY INHALATION TREATMENT: CPT

## 2018-04-11 PROCEDURE — 72131 CT LUMBAR SPINE W/O DYE: CPT

## 2018-04-11 PROCEDURE — 87899 AGENT NOS ASSAY W/OPTIC: CPT | Performed by: NURSE PRACTITIONER

## 2018-04-11 PROCEDURE — 87040 BLOOD CULTURE FOR BACTERIA: CPT | Performed by: EMERGENCY MEDICINE

## 2018-04-11 PROCEDURE — 80053 COMPREHEN METABOLIC PANEL: CPT | Performed by: EMERGENCY MEDICINE

## 2018-04-11 PROCEDURE — 85025 COMPLETE CBC W/AUTO DIFF WBC: CPT | Performed by: EMERGENCY MEDICINE

## 2018-04-11 PROCEDURE — 83605 ASSAY OF LACTIC ACID: CPT | Performed by: EMERGENCY MEDICINE

## 2018-04-11 PROCEDURE — 71045 X-RAY EXAM CHEST 1 VIEW: CPT

## 2018-04-11 PROCEDURE — 93010 ELECTROCARDIOGRAM REPORT: CPT | Performed by: INTERNAL MEDICINE

## 2018-04-11 PROCEDURE — 63710000001 INSULIN LISPRO (HUMAN) PER 5 UNITS: Performed by: NURSE PRACTITIONER

## 2018-04-11 PROCEDURE — 25010000002 VANCOMYCIN PER 500 MG: Performed by: NURSE PRACTITIONER

## 2018-04-11 RX ORDER — TRAMADOL HYDROCHLORIDE 50 MG/1
50 TABLET ORAL EVERY 6 HOURS PRN
Status: ON HOLD | COMMUNITY
End: 2018-04-18

## 2018-04-11 RX ORDER — FERROUS SULFATE 325(65) MG
325 TABLET ORAL
COMMUNITY

## 2018-04-11 RX ORDER — IPRATROPIUM BROMIDE AND ALBUTEROL SULFATE 2.5; .5 MG/3ML; MG/3ML
3 SOLUTION RESPIRATORY (INHALATION)
Status: DISCONTINUED | OUTPATIENT
Start: 2018-04-11 | End: 2018-04-18 | Stop reason: HOSPADM

## 2018-04-11 RX ORDER — DEXTROSE MONOHYDRATE 25 G/50ML
25 INJECTION, SOLUTION INTRAVENOUS
Status: DISCONTINUED | OUTPATIENT
Start: 2018-04-11 | End: 2018-04-18 | Stop reason: HOSPADM

## 2018-04-11 RX ORDER — MIDODRINE HYDROCHLORIDE 2.5 MG/1
5 TABLET ORAL 3 TIMES DAILY
Status: DISCONTINUED | OUTPATIENT
Start: 2018-04-11 | End: 2018-04-18 | Stop reason: HOSPADM

## 2018-04-11 RX ORDER — ONDANSETRON 2 MG/ML
4 INJECTION INTRAMUSCULAR; INTRAVENOUS EVERY 6 HOURS PRN
Status: DISCONTINUED | OUTPATIENT
Start: 2018-04-11 | End: 2018-04-18 | Stop reason: HOSPADM

## 2018-04-11 RX ORDER — HYDROCODONE BITARTRATE AND ACETAMINOPHEN 5; 325 MG/1; MG/1
1 TABLET ORAL EVERY 4 HOURS PRN
Status: DISCONTINUED | OUTPATIENT
Start: 2018-04-11 | End: 2018-04-18 | Stop reason: HOSPADM

## 2018-04-11 RX ORDER — MIDODRINE HYDROCHLORIDE 5 MG/1
5 TABLET ORAL 3 TIMES DAILY
COMMUNITY

## 2018-04-11 RX ORDER — SODIUM CHLORIDE 0.9 % (FLUSH) 0.9 %
10 SYRINGE (ML) INJECTION AS NEEDED
Status: DISCONTINUED | OUTPATIENT
Start: 2018-04-11 | End: 2018-04-18 | Stop reason: HOSPADM

## 2018-04-11 RX ORDER — FUROSEMIDE 10 MG/ML
40 INJECTION INTRAMUSCULAR; INTRAVENOUS ONCE
Status: DISCONTINUED | OUTPATIENT
Start: 2018-04-11 | End: 2018-04-11

## 2018-04-11 RX ORDER — LEVOTHYROXINE SODIUM 175 UG/1
175 TABLET ORAL DAILY
COMMUNITY
End: 2018-04-18 | Stop reason: HOSPADM

## 2018-04-11 RX ORDER — ONDANSETRON 4 MG/1
4 TABLET, FILM COATED ORAL EVERY 6 HOURS PRN
Status: DISCONTINUED | OUTPATIENT
Start: 2018-04-11 | End: 2018-04-18 | Stop reason: HOSPADM

## 2018-04-11 RX ORDER — POTASSIUM CHLORIDE 750 MG/1
10 TABLET, FILM COATED, EXTENDED RELEASE ORAL 2 TIMES DAILY
COMMUNITY

## 2018-04-11 RX ORDER — FERROUS SULFATE 325(65) MG
325 TABLET ORAL
Status: DISCONTINUED | OUTPATIENT
Start: 2018-04-12 | End: 2018-04-18 | Stop reason: HOSPADM

## 2018-04-11 RX ORDER — SODIUM CHLORIDE 9 MG/ML
125 INJECTION, SOLUTION INTRAVENOUS CONTINUOUS
Status: DISCONTINUED | OUTPATIENT
Start: 2018-04-11 | End: 2018-04-11

## 2018-04-11 RX ORDER — SODIUM CHLORIDE 0.9 % (FLUSH) 0.9 %
1-10 SYRINGE (ML) INJECTION AS NEEDED
Status: DISCONTINUED | OUTPATIENT
Start: 2018-04-11 | End: 2018-04-18 | Stop reason: HOSPADM

## 2018-04-11 RX ORDER — HYDROCODONE BITARTRATE AND ACETAMINOPHEN 5; 325 MG/1; MG/1
1 TABLET ORAL EVERY 4 HOURS PRN
Status: DISCONTINUED | OUTPATIENT
Start: 2018-04-11 | End: 2018-04-11

## 2018-04-11 RX ORDER — DOCUSATE SODIUM 100 MG/1
100 CAPSULE, LIQUID FILLED ORAL 3 TIMES DAILY PRN
COMMUNITY

## 2018-04-11 RX ORDER — AMIODARONE HYDROCHLORIDE 200 MG/1
200 TABLET ORAL DAILY
Status: DISCONTINUED | OUTPATIENT
Start: 2018-04-11 | End: 2018-04-18 | Stop reason: HOSPADM

## 2018-04-11 RX ORDER — ASPIRIN 81 MG/1
81 TABLET ORAL DAILY
Status: DISCONTINUED | OUTPATIENT
Start: 2018-04-11 | End: 2018-04-18 | Stop reason: HOSPADM

## 2018-04-11 RX ORDER — FOLIC ACID 1 MG/1
2 TABLET ORAL DAILY
Status: DISCONTINUED | OUTPATIENT
Start: 2018-04-11 | End: 2018-04-18 | Stop reason: HOSPADM

## 2018-04-11 RX ORDER — CLOPIDOGREL BISULFATE 75 MG/1
75 TABLET ORAL DAILY
Status: DISCONTINUED | OUTPATIENT
Start: 2018-04-11 | End: 2018-04-18 | Stop reason: HOSPADM

## 2018-04-11 RX ORDER — CLOPIDOGREL BISULFATE 75 MG/1
75 TABLET ORAL DAILY
COMMUNITY

## 2018-04-11 RX ORDER — TRAMADOL HYDROCHLORIDE 50 MG/1
50 TABLET ORAL EVERY 6 HOURS PRN
Status: DISCONTINUED | OUTPATIENT
Start: 2018-04-11 | End: 2018-04-18 | Stop reason: HOSPADM

## 2018-04-11 RX ORDER — ACETAMINOPHEN 325 MG/1
650 TABLET ORAL EVERY 4 HOURS PRN
Status: DISCONTINUED | OUTPATIENT
Start: 2018-04-11 | End: 2018-04-18 | Stop reason: HOSPADM

## 2018-04-11 RX ORDER — PANTOPRAZOLE SODIUM 40 MG/1
40 TABLET, DELAYED RELEASE ORAL EVERY MORNING
Status: DISCONTINUED | OUTPATIENT
Start: 2018-04-12 | End: 2018-04-18 | Stop reason: HOSPADM

## 2018-04-11 RX ORDER — ONDANSETRON 4 MG/1
4 TABLET, ORALLY DISINTEGRATING ORAL EVERY 6 HOURS PRN
Status: DISCONTINUED | OUTPATIENT
Start: 2018-04-11 | End: 2018-04-18 | Stop reason: HOSPADM

## 2018-04-11 RX ORDER — IPRATROPIUM BROMIDE AND ALBUTEROL SULFATE 2.5; .5 MG/3ML; MG/3ML
3 SOLUTION RESPIRATORY (INHALATION) EVERY 4 HOURS PRN
Status: DISCONTINUED | OUTPATIENT
Start: 2018-04-11 | End: 2018-04-11

## 2018-04-11 RX ORDER — AMIODARONE HYDROCHLORIDE 200 MG/1
200 TABLET ORAL DAILY
COMMUNITY

## 2018-04-11 RX ORDER — NITROGLYCERIN 0.4 MG/1
0.4 TABLET SUBLINGUAL
COMMUNITY

## 2018-04-11 RX ORDER — FOLIC ACID 1 MG/1
2 TABLET ORAL DAILY
COMMUNITY

## 2018-04-11 RX ORDER — DOCUSATE SODIUM 100 MG/1
100 CAPSULE, LIQUID FILLED ORAL 3 TIMES DAILY PRN
Status: DISCONTINUED | OUTPATIENT
Start: 2018-04-11 | End: 2018-04-18 | Stop reason: HOSPADM

## 2018-04-11 RX ORDER — NICOTINE POLACRILEX 4 MG
15 LOZENGE BUCCAL
Status: DISCONTINUED | OUTPATIENT
Start: 2018-04-11 | End: 2018-04-18 | Stop reason: HOSPADM

## 2018-04-11 RX ADMIN — TAZOBACTAM SODIUM AND PIPERACILLIN SODIUM 4.5 G: 500; 4 INJECTION, SOLUTION INTRAVENOUS at 17:56

## 2018-04-11 RX ADMIN — ENOXAPARIN SODIUM 40 MG: 40 INJECTION SUBCUTANEOUS at 18:34

## 2018-04-11 RX ADMIN — HYDROCODONE BITARTRATE AND ACETAMINOPHEN 1 TABLET: 5; 325 TABLET ORAL at 21:11

## 2018-04-11 RX ADMIN — INSULIN LISPRO 4 UNITS: 100 INJECTION, SOLUTION INTRAVENOUS; SUBCUTANEOUS at 21:10

## 2018-04-11 RX ADMIN — SODIUM CHLORIDE 1000 ML: 9 INJECTION, SOLUTION INTRAVENOUS at 12:40

## 2018-04-11 RX ADMIN — SODIUM CHLORIDE 125 ML/HR: 9 INJECTION, SOLUTION INTRAVENOUS at 15:33

## 2018-04-11 RX ADMIN — MIDODRINE HYDROCHLORIDE 5 MG: 2.5 TABLET ORAL at 21:44

## 2018-04-11 RX ADMIN — MIDODRINE HYDROCHLORIDE 5 MG: 2.5 TABLET ORAL at 18:34

## 2018-04-11 RX ADMIN — VANCOMYCIN HYDROCHLORIDE 1750 MG: 1 INJECTION, POWDER, LYOPHILIZED, FOR SOLUTION INTRAVENOUS at 18:44

## 2018-04-11 RX ADMIN — IPRATROPIUM BROMIDE AND ALBUTEROL SULFATE 3 ML: 2.5; .5 SOLUTION RESPIRATORY (INHALATION) at 21:56

## 2018-04-11 NOTE — ED PROVIDER NOTES
Subjective   Patient is an 82-year-old male who presents to the ER with decreased urine output and lethargy.  Family states that over the last 2 days patient has not had any urine output and has been very lethargic.  Patient states he feels weak but denies any pain.  Patient had kyphoplasty of his lumbar spine 1 week ago.  Patient was at a local nursing home 4 days ago for physical therapy and had a fall during his stay.  Patient was noted to have ecchymosis to his lower back and flank region on the left.  Patient did not hit his head or have any loss of consciousness during the fall.  Patient not had any fevers, chest pain, shortness of breath, abdominal pain, nausea vomiting diarrhea, neurological changes.            Review of Systems   Constitutional: Positive for fatigue.   HENT: Negative.    Eyes: Negative.    Respiratory: Negative.    Cardiovascular: Negative.    Gastrointestinal: Negative.    Endocrine: Negative.    Genitourinary: Positive for decreased urine volume.   Musculoskeletal: Negative.    Skin: Negative.    Allergic/Immunologic: Negative.    Neurological: Positive for weakness.   Hematological: Negative.    Psychiatric/Behavioral: Negative.    All other systems reviewed and are negative.      Past Medical History:   Diagnosis Date   • Diabetes mellitus    • Hypertension    • Myocardial infarction    • Stroke        No Known Allergies    Past Surgical History:   Procedure Laterality Date   • BACK SURGERY     • CARDIAC DEFIBRILLATOR PLACEMENT         History reviewed. No pertinent family history.    Social History     Social History   • Marital status:      Social History Main Topics   • Smoking status: Never Smoker   • Alcohol use No   • Drug use: No   • Sexual activity: Defer     Other Topics Concern   • Not on file           Objective   Physical Exam   Constitutional: He is oriented to person, place, and time. He appears well-developed and well-nourished.   HENT:   Head: Normocephalic and  atraumatic.   Eyes: Conjunctivae are normal. Pupils are equal, round, and reactive to light.   Neck: Normal range of motion.   Cardiovascular: Normal rate, regular rhythm and normal heart sounds.    Pulmonary/Chest: Effort normal and breath sounds normal.   Abdominal: Soft. There is no tenderness.   Musculoskeletal: Normal range of motion. He exhibits no edema or deformity.   See skin exam, nontender to palpation, normal range of motion, neurovascularly intact   Neurological: He is alert and oriented to person, place, and time. He has normal strength.   Skin: Skin is warm.   Ecchymosis to the left flank and lower lumbar region, small blister at the base of the left second toe   Psychiatric: He has a normal mood and affect. His behavior is normal.   Nursing note and vitals reviewed.      Procedures         ED Course  ED Course      EKG: Paced rhythm with a rate of 67, no ST elevation    Patient was given IV fluids.  Bladder scan was performed and showed 196 mL of urine.    Lab Results (last 24 hours)     Procedure Component Value Units Date/Time    POC Glucose Once [[id number]]  (Normal) Collected:  04/11/18 1233    Specimen:  Blood Updated:  04/11/18 1246     Glucose 105 mg/dL      Comment: : [id number] [id number]       CBC & Differential [[id number]] Collected:  04/11/18 1239    Specimen:  Blood Updated:  04/11/18 1303    Narrative:       The following orders were created for panel order CBC & Differential.  Procedure                               Abnormality         Status                     ---------                               -----------         ------                     CBC Auto Differential[[id number]]        Abnormal            Final result                 Please view results for these tests on the individual orders.    Lactic Acid, Plasma [[id number]]  (Normal) Collected:  04/11/18 1239    Specimen:  Blood Updated:  04/11/18 1300     Lactate 1.4 mmol/L     CBC Auto  Differential [[id number]]  (Abnormal) Collected:  04/11/18 1239    Specimen:  Blood Updated:  04/11/18 1303     WBC 10.76 10*3/mm3      RBC 3.56 (L) 10*6/mm3      Hemoglobin 10.6 (L) g/dL      Hematocrit 33.0 (L) %      MCV 92.7 fL      MCH 29.8 pg      MCHC 32.1 (L) g/dL      RDW 13.9 %      RDW-SD 46.9 fl      MPV 10.6 fL      Platelets 210 10*3/mm3      Neutrophil % 81.1 (H) %      Lymphocyte % 10.3 (L) %      Monocyte % 6.6 %      Eosinophil % 0.7 %      Basophil % 0.5 %      Immature Grans % 0.8 %      Neutrophils, Absolute 8.73 (H) 10*3/mm3      Lymphocytes, Absolute 1.11 10*3/mm3      Monocytes, Absolute 0.71 10*3/mm3      Eosinophils, Absolute 0.07 10*3/mm3      Basophils, Absolute 0.05 10*3/mm3      Immature Grans, Absolute 0.09 (H) 10*3/mm3      nRBC 0.0 /100 WBC     Blood Culture - Blood, Blood, Venous Line [[id number]] Collected:  04/11/18 1325    Specimen:  Blood from Arm, Left Updated:  04/11/18 1358    Blood Culture - Blood, Blood, Venous Line [[id number]] Collected:  04/11/18 1329    Specimen:  Blood from Arm, Left Updated:  04/11/18 1357    Comprehensive Metabolic Panel [[id number]]  (Abnormal) Collected:  04/11/18 1330    Specimen:  Blood Updated:  04/11/18 1351     Glucose 113 (H) mg/dL      BUN 27 (H) mg/dL      Creatinine 1.15 mg/dL      Sodium 135 mmol/L      Potassium 4.3 mmol/L      Chloride 98 mmol/L      CO2 31.0 mmol/L      Calcium 9.2 mg/dL      Total Protein 6.2 (L) g/dL      Albumin 3.30 (L) g/dL      ALT (SGPT) 17 U/L      AST (SGOT) 25 U/L      Alkaline Phosphatase 70 U/L      Total Bilirubin 0.7 mg/dL      eGFR Non African Amer 61 mL/min/1.73      Globulin 2.9 gm/dL      A/G Ratio 1.1 g/dL      BUN/Creatinine Ratio 23.5     Anion Gap 6.0 mmol/L     Narrative:       The MDRD GFR formula is only valid for adults with stable renal function between ages 18 and 70.    Urinalysis With / Culture If Indicated - Urine, Clean Catch [[id number]]  (Abnormal) Collected:  04/11/18 1401    Specimen:   Urine from Urine, Clean Catch Updated:  04/11/18 1413     Color, UA Dark Yellow (A)     Appearance, UA Clear     pH, UA <=5.0     Specific Gravity, UA >1.030 (H)     Glucose,  mg/dL (1+) (A)     Ketones, UA Trace (A)     Bilirubin, UA Small (1+) (A)     Blood, UA Negative     Protein, UA Trace (A)     Leuk Esterase, UA Negative     Nitrite, UA Negative     Urobilinogen, UA 1.0 E.U./dL    Narrative:       Urine microscopic not indicated.        XR Foot 3+ View Left   Final Result      XR Chest 1 View   Final Result      CT Lumbar Spine Without Contrast   Final Result   1.  No acute osseous posttraumatic changes   2.  Additional chronic findings as described above including moderate to   severe spinal canal stenosis suspected at L3-L4.           This report was finalized on 04/11/2018 13:35 by Dr. Rachele Hadley MD.      CT Abdomen Pelvis Stone Protocol   Final Result        Labs and imaging were unremarkable.  Suspect dehydration.  Patient was given IV fluids and was then admitted to the hospitalist service for hypotension and dehydration.  Antibiotics were held due to no fever and no signs of infection or source of infection.            MDM    Final diagnoses:   Dehydration   Hypotension, unspecified hypotension type            Sonia Wong MD  04/11/18 4113

## 2018-04-11 NOTE — PROGRESS NOTES
Discharge Planning Assessment  [facility]     Patient Name: Delon Cid  MRN: [id number]  Today's Date: 4/11/2018    Admit Date: 4/11/2018          Discharge Needs Assessment     Row Name 04/11/18 1500       Living Environment    Lives With spouse    Name(s) of Who Lives With Patient Spouse- Erika Cid    Current Living Arrangements home/apartment/condo    Primary Care Provided by spouse/significant other    Provides Primary Care For no one, unable/limited ability to care for self    Quality of Family Relationships supportive    Able to Return to Prior Arrangements other (see comments)   Pt/wife saying pt will return home- not sure if this is realistic       Resource/Environmental Concerns    Resource/Environmental Concerns none    Transportation Concerns car, none       Transition Planning    Patient/Family Anticipates Transition to home with family    Patient/Family Anticipated Services at Transition home health care       Discharge Needs Assessment    Readmission Within the Last 30 Days no previous admission in last 30 days    Concerns to be Addressed basic needs;home safety    Equipment Currently Used at Home walker, rolling;cane, quad;commode;shower chair    Anticipated Changes Related to Illness inability to care for self    Equipment Needed After Discharge none    Outpatient/Agency/Support Group Needs homecare agency    Discharge Facility/Level of Care Needs home with home health            Discharge Plan     Row Name 04/11/18 1500       Plan    Plan HH vs SNF    Patient/Family in Agreement with Plan yes    Plan Comments Spoke with wife/pt in room at assess for home needs. Pt was just discharged from [facility] 2-3 days ago (according to wife), they both say he will return home upon d/c and do not wish for return to SNF.   Pt has needed DME.  She says HH was set up at home, but unsure of company name.  Pt's regular PCP is Dr. Moscoso and they have RX coverage.  Wife says they have supportive  family that help them get groceries etc. as neither of them drive. Will follow.         Destination     No service coordination in this encounter.      Durable Medical Equipment     No service coordination in this encounter.      Dialysis/Infusion     No service coordination in this encounter.      Home Medical Care     No service coordination in this encounter.      Social Care     No service coordination in this encounter.                Demographic Summary    No documentation.           Functional Status    No documentation.           Psychosocial    No documentation.           Abuse/Neglect    No documentation.           Legal    No documentation.           Substance Abuse    No documentation.           Patient Forms    No documentation.         MISSY Cantor
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
## 2018-04-11 NOTE — PROGRESS NOTES
"Pharmacy Dosing Service  Pharmacokinetics  Vancomycin Initial Evaluation    Assessment/Action/Plan:  Initiated Vancomycin 1750 mg IVPB once, followed by 1250 mg every 24 hours. Vancomycin trough will be evaluated when pharmacokinetically appropriate.   Pharmacy will monitor renal function and adjust dose accordingly.     Subjective:  Delon Cid is a 82 y.o. male initiated on Vancomycin 1750 mg IV once for the treatment of Pneumonia by prescriber with corresponding \"Pharmacy to Dose\" consult.    Objective:  Ht: 180.3 cm (71\"); Wt: 71.7 kg (158 lb)  Estimated Creatinine Clearance: 51.1 mL/min (by C-G formula based on SCr of 1.15 mg/dL).   Lab Results   Component Value Date    CREATININE 1.15 04/11/2018    CREATININE 0.78 03/09/2018    CREATININE 0.98 10/26/2017      Lab Results   Component Value Date    WBC 10.76 04/11/2018    WBC 6.88 03/09/2018    WBC 8.68 10/26/2017      Baseline culture results:  Microbiology Results (last 10 days)       ** No results found for the last 240 hours. **            Maliha Melton [facility]  04/11/18 4:45 PM    "

## 2018-04-11 NOTE — PLAN OF CARE
Problem: Patient Care Overview  Goal: Plan of Care Review  Outcome: Ongoing (interventions implemented as appropriate)   04/11/18 6893   Coping/Psychosocial   Plan of Care Reviewed With patient   Plan of Care Review   Progress no change   OTHER   Outcome Summary BP remains low, fluids d/c r/t decreased ef, c/o shoulder pain, iv abx as ordered, no new issues noted/cont to monitor     Goal: Individualization and Mutuality  Outcome: Ongoing (interventions implemented as appropriate)    Goal: Discharge Needs Assessment  Outcome: Ongoing (interventions implemented as appropriate)    Goal: Interprofessional Rounds/Family Conf  Outcome: Ongoing (interventions implemented as appropriate)      Problem: Fluid Volume Deficit (Adult)  Goal: Identify Related Risk Factors and Signs and Symptoms  Outcome: Outcome(s) achieved Date Met: 04/11/18    Goal: Optimal Fluid Balance  Outcome: Ongoing (interventions implemented as appropriate)      Problem: Fall Risk (Adult)  Goal: Identify Related Risk Factors and Signs and Symptoms  Outcome: Outcome(s) achieved Date Met: 04/11/18    Goal: Absence of Fall  Outcome: Ongoing (interventions implemented as appropriate)      Problem: Wound (Includes Pressure Injury) (Adult)  Goal: Signs and Symptoms of Listed Potential Problems Will be Absent, Minimized or Managed (Wound)  Outcome: Ongoing (interventions implemented as appropriate)

## 2018-04-12 ENCOUNTER — APPOINTMENT (OUTPATIENT)
Dept: CARDIOLOGY | Facility: HOSPITAL | Age: 83
End: 2018-04-12

## 2018-04-12 ENCOUNTER — APPOINTMENT (OUTPATIENT)
Dept: GENERAL RADIOLOGY | Facility: HOSPITAL | Age: 83
End: 2018-04-12

## 2018-04-12 PROBLEM — F03.90 DEMENTIA (HCC): Status: ACTIVE | Noted: 2018-04-12

## 2018-04-12 PROBLEM — I50.22 CHRONIC SYSTOLIC CONGESTIVE HEART FAILURE (HCC): Status: ACTIVE | Noted: 2018-04-12

## 2018-04-12 PROBLEM — R06.02 SHORTNESS OF BREATH: Status: ACTIVE | Noted: 2018-04-12

## 2018-04-12 PROBLEM — I47.20 V-TACH (HCC): Status: ACTIVE | Noted: 2018-04-12

## 2018-04-12 PROBLEM — E11.9 TYPE 2 DIABETES MELLITUS (HCC): Status: ACTIVE | Noted: 2018-04-12

## 2018-04-12 PROBLEM — E78.49 OTHER HYPERLIPIDEMIA: Status: ACTIVE | Noted: 2018-04-12

## 2018-04-12 LAB
ANION GAP SERPL CALCULATED.3IONS-SCNC: 10 MMOL/L (ref 4–13)
BH CV ECHO MEAS - AO MAX PG (FULL): 4.8 MMHG
BH CV ECHO MEAS - AO MAX PG: 12.5 MMHG
BH CV ECHO MEAS - AO MEAN PG (FULL): 3 MMHG
BH CV ECHO MEAS - AO MEAN PG: 7 MMHG
BH CV ECHO MEAS - AO ROOT AREA (BSA CORRECTED): 1.8
BH CV ECHO MEAS - AO ROOT AREA: 9.6 CM^2
BH CV ECHO MEAS - AO ROOT DIAM: 3.5 CM
BH CV ECHO MEAS - AO V2 MAX: 177 CM/SEC
BH CV ECHO MEAS - AO V2 MEAN: 125 CM/SEC
BH CV ECHO MEAS - AO V2 VTI: 38.6 CM
BH CV ECHO MEAS - AVA(I,A): 2.2 CM^2
BH CV ECHO MEAS - AVA(I,D): 2.2 CM^2
BH CV ECHO MEAS - AVA(V,A): 2.2 CM^2
BH CV ECHO MEAS - AVA(V,D): 2.2 CM^2
BH CV ECHO MEAS - BSA(HAYCOCK): 1.9 M^2
BH CV ECHO MEAS - BSA: 1.9 M^2
BH CV ECHO MEAS - BZI_BMI: 22 KILOGRAMS/M^2
BH CV ECHO MEAS - BZI_METRIC_HEIGHT: 180.3 CM
BH CV ECHO MEAS - BZI_METRIC_WEIGHT: 71.7 KG
BH CV ECHO MEAS - CONTRAST EF 4CH: 29.5 ML/M^2
BH CV ECHO MEAS - EDV(CUBED): 163.7 ML
BH CV ECHO MEAS - EDV(MOD-SP4): 176 ML
BH CV ECHO MEAS - EDV(TEICH): 145.6 ML
BH CV ECHO MEAS - EF(CUBED): 51.8 %
BH CV ECHO MEAS - EF(MOD-SP4): 29.5 %
BH CV ECHO MEAS - EF(TEICH): 43.3 %
BH CV ECHO MEAS - ESV(CUBED): 79 ML
BH CV ECHO MEAS - ESV(MOD-SP4): 124 ML
BH CV ECHO MEAS - ESV(TEICH): 82.6 ML
BH CV ECHO MEAS - FS: 21.6 %
BH CV ECHO MEAS - IVS/LVPW: 1.1
BH CV ECHO MEAS - IVSD: 1.1 CM
BH CV ECHO MEAS - LA DIMENSION: 3.8 CM
BH CV ECHO MEAS - LA/AO: 1.1
BH CV ECHO MEAS - LV DIASTOLIC VOL/BSA (35-75): 92.2 ML/M^2
BH CV ECHO MEAS - LV MASS(C)D: 225.2 GRAMS
BH CV ECHO MEAS - LV MASS(C)DI: 118 GRAMS/M^2
BH CV ECHO MEAS - LV MAX PG: 7.7 MMHG
BH CV ECHO MEAS - LV MEAN PG: 4 MMHG
BH CV ECHO MEAS - LV SYSTOLIC VOL/BSA (12-30): 65 ML/M^2
BH CV ECHO MEAS - LV V1 MAX: 139 CM/SEC
BH CV ECHO MEAS - LV V1 MEAN: 91.2 CM/SEC
BH CV ECHO MEAS - LV V1 VTI: 29.6 CM
BH CV ECHO MEAS - LVIDD: 5.5 CM
BH CV ECHO MEAS - LVIDS: 4.3 CM
BH CV ECHO MEAS - LVLD AP4: 9.6 CM
BH CV ECHO MEAS - LVLS AP4: 9.1 CM
BH CV ECHO MEAS - LVOT AREA (M): 2.8 CM^2
BH CV ECHO MEAS - LVOT AREA: 2.8 CM^2
BH CV ECHO MEAS - LVOT DIAM: 1.9 CM
BH CV ECHO MEAS - LVPWD: 0.99 CM
BH CV ECHO MEAS - MR ALIAS VEL: 30.8 CM/SEC
BH CV ECHO MEAS - MR ERO: 0.03 CM^2
BH CV ECHO MEAS - MR FLOW RATE: 17.4 CM^3/SEC
BH CV ECHO MEAS - MR MAX PG: 114.1 MMHG
BH CV ECHO MEAS - MR MAX VEL: 534 CM/SEC
BH CV ECHO MEAS - MR MEAN PG: 72 MMHG
BH CV ECHO MEAS - MR MEAN VEL: 391 CM/SEC
BH CV ECHO MEAS - MR PISA RADIUS: 0.3 CM
BH CV ECHO MEAS - MR PISA: 0.57 CM^2
BH CV ECHO MEAS - MR VOLUME: 6.5 ML
BH CV ECHO MEAS - MR VTI: 198 CM
BH CV ECHO MEAS - MV A MAX VEL: 91.2 CM/SEC
BH CV ECHO MEAS - MV DEC TIME: 0.15 SEC
BH CV ECHO MEAS - MV E MAX VEL: 118 CM/SEC
BH CV ECHO MEAS - MV E/A: 1.3
BH CV ECHO MEAS - PA MAX PG: 1.6 MMHG
BH CV ECHO MEAS - PA V2 MAX: 63.5 CM/SEC
BH CV ECHO MEAS - PI END-D VEL: 202 CM/SEC
BH CV ECHO MEAS - RAP SYSTOLE: 5 MMHG
BH CV ECHO MEAS - RVSP: 46.2 MMHG
BH CV ECHO MEAS - SI(AO): 194.7 ML/M^2
BH CV ECHO MEAS - SI(CUBED): 44.4 ML/M^2
BH CV ECHO MEAS - SI(LVOT): 44 ML/M^2
BH CV ECHO MEAS - SI(MOD-SP4): 27.3 ML/M^2
BH CV ECHO MEAS - SI(TEICH): 33 ML/M^2
BH CV ECHO MEAS - SV(AO): 371.4 ML
BH CV ECHO MEAS - SV(CUBED): 84.7 ML
BH CV ECHO MEAS - SV(LVOT): 83.9 ML
BH CV ECHO MEAS - SV(MOD-SP4): 52 ML
BH CV ECHO MEAS - SV(TEICH): 63 ML
BH CV ECHO MEAS - TR MAX VEL: 321 CM/SEC
BUN BLD-MCNC: 26 MG/DL (ref 5–21)
BUN/CREAT SERPL: 23.9 (ref 7–25)
CALCIUM SPEC-SCNC: 9.1 MG/DL (ref 8.4–10.4)
CHLORIDE SERPL-SCNC: 100 MMOL/L (ref 98–110)
CO2 SERPL-SCNC: 26 MMOL/L (ref 24–31)
CREAT BLD-MCNC: 1.09 MG/DL (ref 0.5–1.4)
DEPRECATED RDW RBC AUTO: 47 FL (ref 40–54)
E/E' RATIO: 37.5
ERYTHROCYTE [DISTWIDTH] IN BLOOD BY AUTOMATED COUNT: 14 % (ref 12–15)
GFR SERPL CREATININE-BSD FRML MDRD: 65 ML/MIN/1.73
GLUCOSE BLD-MCNC: 139 MG/DL (ref 70–100)
GLUCOSE BLDC GLUCOMTR-MCNC: 102 MG/DL (ref 70–130)
GLUCOSE BLDC GLUCOMTR-MCNC: 154 MG/DL (ref 70–130)
GLUCOSE BLDC GLUCOMTR-MCNC: 167 MG/DL (ref 70–130)
GLUCOSE BLDC GLUCOMTR-MCNC: 234 MG/DL (ref 70–130)
GLUCOSE BLDC GLUCOMTR-MCNC: 247 MG/DL (ref 70–130)
HCT VFR BLD AUTO: 31.6 % (ref 40–52)
HGB BLD-MCNC: 10 G/DL (ref 14–18)
LEFT ATRIUM VOLUME INDEX: 38.8 ML/M2
LEFT ATRIUM VOLUME: 74.1 CM3
LV EF 2D ECHO EST: 30 %
MAXIMAL PREDICTED HEART RATE: 139 BPM
MCH RBC QN AUTO: 29.5 PG (ref 28–32)
MCHC RBC AUTO-ENTMCNC: 31.6 G/DL (ref 33–36)
MCV RBC AUTO: 93.2 FL (ref 82–95)
PLATELET # BLD AUTO: 179 10*3/MM3 (ref 130–400)
PMV BLD AUTO: 10.8 FL (ref 6–12)
POTASSIUM BLD-SCNC: 4.1 MMOL/L (ref 3.5–5.3)
RBC # BLD AUTO: 3.39 10*6/MM3 (ref 4.8–5.9)
SODIUM BLD-SCNC: 136 MMOL/L (ref 135–145)
STRESS TARGET HR: 118 BPM
WBC NRBC COR # BLD: 7.03 10*3/MM3 (ref 4.8–10.8)

## 2018-04-12 PROCEDURE — 87205 SMEAR GRAM STAIN: CPT | Performed by: NURSE PRACTITIONER

## 2018-04-12 PROCEDURE — 0399T ADULT TRANSTHORACIC ECHO COMPLETE W/ CONT IF NECESSARY PER PROTOCOL: CPT | Performed by: INTERNAL MEDICINE

## 2018-04-12 PROCEDURE — 63710000001 INSULIN LISPRO (HUMAN) PER 5 UNITS: Performed by: NURSE PRACTITIONER

## 2018-04-12 PROCEDURE — 92611 MOTION FLUOROSCOPY/SWALLOW: CPT

## 2018-04-12 PROCEDURE — 25010000002 PERFLUTREN 6.52 MG/ML SUSPENSION: Performed by: FAMILY MEDICINE

## 2018-04-12 PROCEDURE — 71046 X-RAY EXAM CHEST 2 VIEWS: CPT

## 2018-04-12 PROCEDURE — 99222 1ST HOSP IP/OBS MODERATE 55: CPT | Performed by: INTERNAL MEDICINE

## 2018-04-12 PROCEDURE — 94799 UNLISTED PULMONARY SVC/PX: CPT

## 2018-04-12 PROCEDURE — 92610 EVALUATE SWALLOWING FUNCTION: CPT

## 2018-04-12 PROCEDURE — 25010000002 VANCOMYCIN 10 G RECONSTITUTED SOLUTION: Performed by: NURSE PRACTITIONER

## 2018-04-12 PROCEDURE — 80048 BASIC METABOLIC PNL TOTAL CA: CPT | Performed by: NURSE PRACTITIONER

## 2018-04-12 PROCEDURE — G8996 SWALLOW CURRENT STATUS: HCPCS

## 2018-04-12 PROCEDURE — 85027 COMPLETE CBC AUTOMATED: CPT | Performed by: NURSE PRACTITIONER

## 2018-04-12 PROCEDURE — 93306 TTE W/DOPPLER COMPLETE: CPT

## 2018-04-12 PROCEDURE — G8997 SWALLOW GOAL STATUS: HCPCS

## 2018-04-12 PROCEDURE — 82962 GLUCOSE BLOOD TEST: CPT

## 2018-04-12 PROCEDURE — 87070 CULTURE OTHR SPECIMN AEROBIC: CPT | Performed by: NURSE PRACTITIONER

## 2018-04-12 PROCEDURE — 25010000002 PIPERACILLIN SOD-TAZOBACTAM PER 1 G: Performed by: NURSE PRACTITIONER

## 2018-04-12 PROCEDURE — 0399T HC MYOCARDL STRAIN IMAG QUAN ASSMT PER SESS: CPT

## 2018-04-12 PROCEDURE — 25010000002 ENOXAPARIN PER 10 MG: Performed by: NURSE PRACTITIONER

## 2018-04-12 PROCEDURE — 74230 X-RAY XM SWLNG FUNCJ C+: CPT

## 2018-04-12 PROCEDURE — 93306 TTE W/DOPPLER COMPLETE: CPT | Performed by: INTERNAL MEDICINE

## 2018-04-12 RX ORDER — ATORVASTATIN CALCIUM 40 MG/1
40 TABLET, FILM COATED ORAL NIGHTLY
Status: DISCONTINUED | OUTPATIENT
Start: 2018-04-12 | End: 2018-04-18 | Stop reason: HOSPADM

## 2018-04-12 RX ORDER — CARVEDILOL 6.25 MG/1
6.25 TABLET ORAL EVERY 12 HOURS SCHEDULED
Status: DISCONTINUED | OUTPATIENT
Start: 2018-04-12 | End: 2018-04-18 | Stop reason: HOSPADM

## 2018-04-12 RX ADMIN — IPRATROPIUM BROMIDE AND ALBUTEROL SULFATE 3 ML: 2.5; .5 SOLUTION RESPIRATORY (INHALATION) at 12:20

## 2018-04-12 RX ADMIN — CARVEDILOL 6.25 MG: 6.25 TABLET, FILM COATED ORAL at 17:28

## 2018-04-12 RX ADMIN — INSULIN LISPRO 2 UNITS: 100 INJECTION, SOLUTION INTRAVENOUS; SUBCUTANEOUS at 20:20

## 2018-04-12 RX ADMIN — FOLIC ACID 2 MG: 1 TABLET ORAL at 09:43

## 2018-04-12 RX ADMIN — INSULIN LISPRO 4 UNITS: 100 INJECTION, SOLUTION INTRAVENOUS; SUBCUTANEOUS at 12:50

## 2018-04-12 RX ADMIN — TAZOBACTAM SODIUM AND PIPERACILLIN SODIUM 4.5 G: 500; 4 INJECTION, SOLUTION INTRAVENOUS at 09:43

## 2018-04-12 RX ADMIN — MIDODRINE HYDROCHLORIDE 5 MG: 2.5 TABLET ORAL at 09:42

## 2018-04-12 RX ADMIN — ASPIRIN 81 MG: 81 TABLET ORAL at 09:43

## 2018-04-12 RX ADMIN — BARIUM SULFATE 20 ML: 400 SUSPENSION ORAL at 14:10

## 2018-04-12 RX ADMIN — FERROUS SULFATE TAB 325 MG (65 MG ELEMENTAL FE) 325 MG: 325 (65 FE) TAB at 09:43

## 2018-04-12 RX ADMIN — INSULIN LISPRO 2 UNITS: 100 INJECTION, SOLUTION INTRAVENOUS; SUBCUTANEOUS at 09:42

## 2018-04-12 RX ADMIN — ENOXAPARIN SODIUM 40 MG: 40 INJECTION SUBCUTANEOUS at 17:27

## 2018-04-12 RX ADMIN — BARIUM SULFATE 20 ML: 400 PASTE ORAL at 14:10

## 2018-04-12 RX ADMIN — MIDODRINE HYDROCHLORIDE 5 MG: 2.5 TABLET ORAL at 17:27

## 2018-04-12 RX ADMIN — TAZOBACTAM SODIUM AND PIPERACILLIN SODIUM 4.5 G: 500; 4 INJECTION, SOLUTION INTRAVENOUS at 00:31

## 2018-04-12 RX ADMIN — VANCOMYCIN HYDROCHLORIDE 1250 MG: 10 INJECTION, POWDER, LYOPHILIZED, FOR SOLUTION INTRAVENOUS at 17:28

## 2018-04-12 RX ADMIN — PERFLUTREN 9.78 MG: 6.52 INJECTION, SUSPENSION INTRAVENOUS at 08:55

## 2018-04-12 RX ADMIN — PANTOPRAZOLE SODIUM 40 MG: 40 TABLET, DELAYED RELEASE ORAL at 09:42

## 2018-04-12 RX ADMIN — AMIODARONE HYDROCHLORIDE 200 MG: 200 TABLET ORAL at 09:43

## 2018-04-12 RX ADMIN — TAZOBACTAM SODIUM AND PIPERACILLIN SODIUM 4.5 G: 500; 4 INJECTION, SOLUTION INTRAVENOUS at 23:39

## 2018-04-12 RX ADMIN — IPRATROPIUM BROMIDE AND ALBUTEROL SULFATE 3 ML: 2.5; .5 SOLUTION RESPIRATORY (INHALATION) at 06:28

## 2018-04-12 RX ADMIN — CLOPIDOGREL 75 MG: 75 TABLET, FILM COATED ORAL at 09:43

## 2018-04-12 RX ADMIN — IPRATROPIUM BROMIDE AND ALBUTEROL SULFATE 3 ML: 2.5; .5 SOLUTION RESPIRATORY (INHALATION) at 19:17

## 2018-04-12 RX ADMIN — BARIUM SULFATE 20 ML: 0.81 POWDER, FOR SUSPENSION ORAL at 14:10

## 2018-04-12 RX ADMIN — MIDODRINE HYDROCHLORIDE 5 MG: 2.5 TABLET ORAL at 20:19

## 2018-04-12 RX ADMIN — DESMOPRESSIN ACETATE 40 MG: 0.2 TABLET ORAL at 20:19

## 2018-04-12 NOTE — MBS/VFSS/FEES
Acute Care - Speech Language Pathology   Swallow VFSS  [facility]     Patient Name: Delon Cid  : 1935  MRN: [id number]  Today's Date: 2018               Admit Date: 2018   SPEECH-LANGUAGE PATHOLOGY EVALUTION - VFSS  Subjective: The patient was seen on this date for a VFSS(Videofluoroscopic Swallowing Study).  Patient was alert and cooperative.    Objective: Risks/benefits were reviewed with the patient and family, and consent was obtained. The study was completed with SLP and Radiologist present. The patient was seen in lateral view(s). Textures given included thin liquid, nectar thick liquid, honey thick liquid, puree consistency and mechanical soft consistency.  Assessment: Pt was presented the following consistencies in the stated order: honey thick, nectar thick, thin, pudding thick, mechanical soft, and repeat nectar thick trial. Pt was noted to have decreased bolus formation with prolonged oral prep phase at time. Decreased base of tongue strength, resulting in premature loss of the bolus to the pyriform sinuses with thin and nectar thick. Delay in swallow initiation with all presented consistencies. Weak laryngeal elevation, with mildly decreased anterior hyolaryngeal excursion. Poor epiglottic inversion, which was absent at times. Penetration with nectar thick with noted throat clear. Profound penetration with aspiration with thin, again with a throat clear observed, though not successful in clearing residue from the laryngeal vesibule, which remained throughout the duration of the study, with periods of subsequent trace aspiration. Pt was also noted to have persistent, moderate residue in the vallecula, lateral channels, and pyriform sinuses, which resulted in penetration during the time of the pudding thick trial, as well as occasionally throughout the remainder of the study. Moderate oropharygeal residue was noted, with a mild amount in the laryngeal vestibule from the thin liquid  trial.   SLP Findings: Patient presents with moderate to severe oropharyngeal dysphagia.   Comments: The results of this study were discussed in full with the pt's wife via [facility] [place] graduate Speech Clinician, Carson Levy, as well as with pt's RN, Cari Avalos. Family and RN verbalized understanding of concerns and below recommendations.   Recommendations: Diet Textures: honey thick liquid, mechanical soft consistency food. Medications should be taken whole or crushed with puree. May have water and Ice between meals after oral care, under staff or family supervision and with the recommended strategies for safe swallowing.  Recommended Strategies: Upright for PO, small bites and sips and supervision with all PO. Oral care before breakfast, after all meals and PRN.  Dysphagia therapy is recommended. Rationale: See above. ST to continue to follow and tx for dysphagia. Thanks! Beulah Fraga, CCC-SLP 4/12/2018 4:01 PM  Visit Dx:     ICD-10-CM ICD-9-CM   1. Dehydration E86.0 276.51   2. Hypotension, unspecified hypotension type I95.9 458.9   3. Oropharyngeal dysphagia R13.12 787.22     Patient Active Problem List   Diagnosis   • Dehydration   • Healthcare-associated pneumonia   • Chronic systolic congestive heart failure   • Shortness of breath   • V-tach   • Other hyperlipidemia   • Type 2 diabetes mellitus   • Dementia     Past Medical History:   Diagnosis Date   • CAD (coronary artery disease)    • Cardiomyopathy    • Chronic systolic CHF (congestive heart failure)    • Dementia    • Hypertension    • Insulin dependent diabetes mellitus    • Myocardial infarction    • Stroke    • Sustained VT (ventricular tachycardia)      Past Surgical History:   Procedure Laterality Date   • BACK SURGERY     • CARDIAC DEFIBRILLATOR PLACEMENT     • CAROTID ENDARTERECTOMY Right    • CATARACT EXTRACTION, BILATERAL     • CORONARY ARTERY BYPASS GRAFT     • KYPHOPLASTY            SWALLOW EVALUATION (last 72 hours)      SLP Adult Swallow  Evaluation     Row Name 04/12/18 1430 04/12/18 0945                Rehab Evaluation    Document Type evaluation  -TM (P)  evaluation  -DS       Subjective Information no complaints  -TM  --       Patient Observations alert;cooperative   Confused   -TM (P)  cooperative  -DS       Patient/Family Observations No family accompanying pt to study  -TM (P)  Wife present during evaluation.  -DS       Patient Effort adequate  -TM (P)  adequate  -DS          General Information    Patient Profile Reviewed yes  -TM (P)  yes  -DS       Pertinent History Of Current Problem History of pneumonia; stroke. Chest xray results; mild patchy lower lobe infiltrate.  -TM (P)  History of pneumonia; stroke. Chest xray results; mild patchy lower lobe infiltrate.  -DS       Current Method of Nutrition soft textures;honey-thick liquids  -TM (P)  regular textures;thin liquids  -DS       Precautions/Limitations, Vision WFL;for purposes of eval  -TM (P)  WFL;for purposes of eval  -DS       Precautions/Limitations, Hearing hearing impairment, bilaterally  -TM (P)  hearing impairment, bilaterally  -DS       Prior Level of Function-Communication cognitive-linguistic impairment  -TM (P)  cognitive-linguistic impairment  -DS       Prior Level of Function-Swallowing no diet consistency restrictions  -TM (P)  no diet consistency restrictions  -DS       Plans/Goals Discussed with patient;spouse/S.O.  -TM (P)  patient;spouse/S.O.  -DS       Barriers to Rehab cognitive status  -TM (P)  cognitive status  -DS       Patient's Goals for Discharge patient did not state  -TM (P)  patient did not state  -DS       Family Goals for Discharge patient able to return to regular diet  -TM (P)  family did not state  -DS          Pain Assessment    Additional Documentation Pain Scale: Numbers Pre/Post-Treatment (Group)  -TM (P)  Pain Scale: FACES Pre/Post-Treatment (Group)  -DS          Pain Scale: Numbers Pre/Post-Treatment    Pain Scale: Numbers, Pretreatment 0/10 - no  pain  -TM  --       Pain Scale: Numbers, Post-Treatment 0/10 - no pain  -TM  --          Pain Scale: FACES Pre/Post-Treatment    Pain: FACES Scale, Pretreatment  -- (P)  0-->no hurt  -DS       Pain: FACES Scale, Post-Treatment  -- (P)  0-->no hurt  -DS          Oral Motor and Function    Dentition Assessment natural, present and adequate  -TM (P)  natural, present and adequate  -DS       Secretion Management WNL/WFL  -TM (P)  WNL/WFL  -DS       Mucosal Quality moist, healthy  -TM (P)  moist, healthy  -DS       Volitional Swallow WFL  -TM (P)  WFL  -DS       Volitional Cough WFL  -TM (P)  WFL  -DS          Oral Musculature and Cranial Nerve Assessment    Oral Motor General Assessment oral labial or buccal impairment;mandibular impairment;lingual impairment  -TM (P)  WFL  -DS       Mandibular Impairment Detail, Cranial Nerve V (Trigeminal) reduced mandibular ROM;reduced strength bilaterally  -TM  --       Oral Labial or Buccal Impairment, Detail, Cranial Nerve VII (Facial): reduced ROM;reduced strength bilaterally  -TM  --       Lingual Impairment, Detail. Cranial Nerves IX, XII (Glossopharyngeal and Hypoglossal) reduced lingual ROM;reduced strength  -TM  --       Oral Motor, Comment L labial weakness with smile  -TM  --          General Eating/Swallowing Observations    Respiratory Support Currently in Use room air  -TM (P)  room air  -DS       Eating/Swallowing Skills  -- (P)  self-fed;fed by SLP  -DS       Positioning During Eating  -- (P)  upright in bed  -DS       Utensils Used  -- (P)  spoon;straw  -DS       Consistencies Trialed  -- (P)  regular textures;pureed;thin liquids;nectar/syrup-thick liquids;honey-thick liquids  -DS          Respiratory    Respiratory Status room air  -TM  --          Clinical Swallow Eval    Oral Prep Phase  -- (P)  impaired  -DS       Oral Transit  -- (P)  WFL  -DS       Oral Residue  -- (P)  impaired  -DS       Pharyngeal Phase  -- (P)  suspected pharyngeal impairment  -DS        Esophageal Phase  -- (P)  unremarkable  -DS       Clinical Swallow Evaluation Summary  -- (P)  CBSE completed. When Speech Therapy arrived pt was eating breakfast, regular and thin liquid consistency. The pt's wife was prompting him to swallow and pace during meal. Pt completed trials of puree, regular and honey, nectar, thin liquids. Pt exhibited coughing as well as throat clearing after consuming thin and nectar. Pt exhibited oral holding with the trials of regular and with his breakfast. Pt exhibited prolonged mastication. After trials of regular pt had mild residue in the oral cavity and was prompted to use a liquid wash to clear. SLP recommending  [facility] and honey thick liquids until a dysphagia study can be completed. Meds okay whole in applesause or with thick liquids. SLP will continue to follow and treat.   -DS          Oral Prep Concerns    Oral Prep Concerns  -- (P)  oral holding;prolonged mastication  -DS       Oral Holding  -- (P)  all consistencies  -DS       Prolonged Mastication  -- (P)  pudding;regular consistencies  -DS       Oral Prep Concerns, Comment  -- (P)  Pt exhibited oral holding when consuming he breakfast of regular consistencies and prolonged chewing when presented with trials of regular.   -DS          Oral Residue Concerns    Oral Residue Concerns  -- (P)  diffuse residue throughout oral cavity  -DS       Diffuse Residue Throughout Oral Cavity  -- (P)  regular consistencies  -DS       Oral Residue Concerns, Comment  -- (P)  Mild amout of oral residue in the oral cavity.   -DS          Pharyngeal Phase Concerns    Pharyngeal Phase Concerns  -- (P)  cough;throat clear  -DS       Cough  -- (P)  thin  -DS       Throat Clear  -- (P)  thin;nectar  -DS       Pharyngeal Phase Concerns, Comment  -- (P)  During the CBSE the pt exhibited throat clear with the nectar thick liquids and coughed when presented with the thin liquids.   -DS          MBS/VFSS    Utensils Used spoon;straw  -TM  --        Consistencies Trialed pudding thick;honey-thick liquids;nectar/syrup-thick liquids;thin liquids;soft textures  -TM  --          MBS/VFSS Interpretation    Oral Prep Phase impaired oral phase of swallowing  -TM  --       Oral Transit Phase impaired  -TM  --       Oral Residue WFL  -TM  --       VFSS Summary VFSS completed. Pt was presented the following consistencies in the stated order: honey thick, nectar thick, thin, pudding thick, mechanical soft, and repeat nectar thick trial. Pt was noted to have decreased bolus formation with prolonged oral prep phase at time. Decreased base of tongue strength, resulting in premature loss of the bolus to the pyriform sinuses with thin and nectar thick. Delay in swallow initiation with all presented consistencies. Weak laryngeal elevation, with mildly decreased anterior hyolaryngeal excursion. Poor epiglottic inversion, which was absent at times. Penetration with nectar thick with noted throat clear. Profound penetration with aspiration with thin, again with a throat clear observed, though not successful in clearing residue from the laryngeal vesibule, which remained throughout the duration of the study, with periods of subsequent trace aspiration. Pt was also noted to have persistent, moderate residue in the vallecula, lateral channels, and pyriform sinuses, which resulted in penetration during the time of the pudding thick trial, as well as occasionally throughout the remainder of the study. Moderate oropharygeal residue was noted, with a mild amount in the laryngeal vestibule from the thin liquid trial.   -TM  --          Oral Preparatory Phase    Oral Preparatory Phase prolonged manipulation  -TM  --       Prolonged Manipulation pudding/puree  -TM  --          Oral Transit Phase    Impaired Oral Transit Phase delayed initiation of bolus transit;premature spillage of liquids into pharynx  -TM  --       Delayed Initiation of bolus transit all consistencies tested  -TM  --        Premature Spillage of Liquids into Pharynx all consistencies tested  -TM  --          Initiation of Pharyngeal Swallow    Initiation of Pharyngeal Swallow bolus in valleculae;bolus in pyriform sinuses  -TM  --       Pharyngeal Phase impaired pharyngeal phase of swallowing  -TM  --       Penetration During the Swallow thin liquids;nectar-thick liquids  -TM  --       Aspiration During the Swallow thin liquids  -TM  --       Response to Penetration throat clear  -TM  --       Response to Aspiration throat clear  -TM  --       Pharyngeal Residue diffuse within pharynx  -TM  --          Clinical Impression    SLP Swallowing Diagnosis moderate;oral dysfunction;mod-severe;pharyngeal dysfunction  -TM (P)  mild;oral dysfunction;suspected pharyngeal dysfunction  -DS       Functional Impact risk of aspiration/pneumonia;risk of malnutrition;risk of dehydration  -TM (P)  risk of aspiration/pneumonia;risk of malnutrition;risk of dehydration  -DS       Rehab Potential/Prognosis, Swallowing adequate, monitor progress closely  -TM (P)  good, to achieve stated therapy goals  -DS       Criteria for Skilled Therapeutic Interventions Met demonstrates skilled criteria  -TM (P)  demonstrates skilled criteria  -DS          Recommendations    Therapy Frequency (Swallow) at least;3 days per week  -TM (P)  at least;3 days per week  -DS       Predicted Duration Therapy Intervention (Days) until discharge  -TM (P)  until discharge  -DS       SLP Diet Recommendation mechanical soft with no mixed consistencies;honey thick liquids  -TM (P)  mechanical soft with no mixed consistencies;honey thick liquids  -DS       Recommended Diagnostics  -- (P)  VFSS (MBS)  -DS       Recommended Precautions and Strategies upright posture during/after eating;small bites of food and sips of liquid  -TM (P)  upright posture during/after eating;small bites of food and sips of liquid  -DS       SLP Rec. for Method of Medication Administration meds whole;meds  crushed;with pudding or applesauce  -TM (P)  meds whole;with pudding or applesauce;with thick liquids  -DS       Monitor for Signs of Aspiration notify SLP if any concerns;cough;gurgly voice;throat clearing;pneumonia;right lower lobe infiltrates  -TM (P)  notify SLP if any concerns  -DS       Anticipated Dischage Disposition unknown  -TM (P)  unknown  -DS          Swallow Goals (SLP)    Oral Nutrition/Hydration Goal Selection (SLP) oral nutrition/hydration, SLP goal 1  -TM (P)  oral nutrition/hydration, SLP goal 1  -DS       Labial Strengthening Goal Selection (SLP) labial strengthening, SLP goal 1  -TM  --       Lingual Strengthening Goal Selection (SLP) lingual strengthening, SLP goal 1  -TM  --       Pharyngeal Strengthening Exercise Goal Selection (SLP) pharyngeal strengthening exercise, SLP goal 1  -TM  --       Additional Documentation labial strengthening goal selection (SLP);lingual strengthening goal selection (SLP);pharyngeal strengthening exercise goal selection (SLP)  -TM  --          Oral Nutrition/Hydration Goal 1 (SLP)    Oral Nutrition/Hydration Goal 1, SLP Pt will tolerate trials of recommended anad upgraded diet consistencies without overt s/s of aspiration.   -TM (P)  LTG: Pt will tolerate complete trials of LRD without any s/s of aspiration.   -DS       Time Frame (Oral Nutrition/Hydration Goal 1, SLP) by discharge  -TM (P)  by discharge  -DS       Barriers (Oral Nutrition/Hydration Goal 1, SLP) cognition  -TM (P)  cognition  -DS       Progress/Outcomes (Oral Nutrition/Hydration Goal 1, SLP) goal ongoing  -TM (P)  goal ongoing  -DS          Labial Strengthening Goal 1 (SLP)    Activity (Labial Strengthening Goal 1, SLP) increase labial tone  -TM  --       Increase Labial Tone labial resistance exercises  -TM  --       [place]/Accuracy (Labial Strengthening Goal 1, SLP) with minimal cues (75-90% accuracy)  -TM  --       Time Frame (Labial Strengthening Goal 1, SLP) by discharge  -TM  --        Barriers (Labial Strengthening Goal 1, SLP) Cognition  -TM  --       Progress/Outcomes (Labial Strengthening Goal 1, SLP) goal ongoing  -TM  --          Lingual Strengthening Goal 1 (SLP)    Activity (Lingual Strengthening Goal 1, SLP) increase lingual tone/sensation/control/coordination/movement;increase tongue back strength  -TM  --       Increase Lingual Tone/Sensation/Control/Coordination/Movement lingual resistance exercises;lingual movement exercises  -TM  --       Increase Tongue Back Strength lingual movement exercises  -TM  --       [place]/Accuracy (Lingual Strengthening Goal 1, SLP) with minimal cues (75-90% accuracy)  -TM  --       Time Frame (Lingual Strengthening Goal 1, SLP) by discharge  -TM  --       Barriers (Lingual Strengthening Goal 1, SLP) Cognition  -TM  --       Progress/Outcomes (Lingual Strengthening Goal 1, SLP) goal ongoing  -TM  --          Pharyngeal Strengthening Exercise Goal 1 (SLP)    Activity (Pharyngeal Strengthening Goal 1, SLP) increase timing;increase closure at entrance to airway/closure of airway at glottis;increase squeeze/positive pressure generation;increase tongue base retraction  -TM  --       Increase Timing gustatory stimulation (sour/cold)  -TM  --       Increase Closure at Entrance to Airway/Closure of Airway at Glottis --   Repeat vowel  -TM  --       Increase Squeeze/Positive Pressure Generation hard effortful swallow  -TM  --       Increase Tongue Base Retraction derrek  -TM  --       [place]/Accuracy (Pharyngeal Strengthening Goal 1, SLP) with minimal cues (75-90% accuracy)  -TM  --       Time Frame (Pharyngeal Strengthening Goal 1, SLP) by discharge  -TM  --       Barriers (Pharyngeal Strengthening Goal 1, SLP) Cognition  -TM  --       Progress/Outcomes (Pharyngeal Strengthening Goal 1, SLP) goal ongoing  -TM  --         User Key  (r) = Recorded By, (t) = Taken By, (c) = Cosigned By    Initials Name Effective Dates    TM Beulah Fraga CCC-SLP  08/02/16 -     DS Lam Levy, Speech Therapy Student 03/02/18 -         EDUCATION  The patient has been educated in the following areas:   Dysphagia (Swallowing Impairment).    SLP Recommendation and Plan  SLP Swallowing Diagnosis: moderate, oral dysfunction, mod-severe, pharyngeal dysfunction  SLP Diet Recommendation: mechanical soft with no mixed consistencies, honey thick liquids  Recommended Precautions and Strategies: upright posture during/after eating, small bites of food and sips of liquid     Monitor for Signs of Aspiration: notify SLP if any concerns, cough, gurgly voice, throat clearing, pneumonia, right lower lobe infiltrates     Criteria for Skilled Therapeutic Interventions Met: demonstrates skilled criteria  Anticipated Dischage Disposition: unknown  Rehab Potential/Prognosis, Swallowing: adequate, monitor progress closely  Therapy Frequency (Swallow): at least, 3 days per week  Predicted Duration Therapy Intervention (Days): until discharge       Plan of Care Reviewed With: spouse, other (see comments) (RN)  Plan of Care Review  Plan of Care Reviewed With: spouse, other (see comments) (RN)  Progress:  (Eval)  Outcome Summary: VFSS completed. Pt was presented the following consistencies in the stated order: honey thick, nectar thick, thin, pudding thick, mechanical soft, and repeat nectar thick trial. Pt was noted to have decreased bolus formation with prolonged oral prep phase at time. Decreased base of tongue strength, resulting in premature loss of the bolus to the pyriform sinuses with thin and nectar thick. Delay in swallow initiation with all presented consistencies. Weak laryngeal elevation, with mildly decreased anterior hyolaryngeal excursion. Poor epiglottic inversion, which was absent at times. Penetration with nectar thick with noted throat clear. Profound penetration with aspiration with thin, again with a throat clear observed, though not successful in clearing residue from the  laryngeal vesibule, which remained throughout the duration of the study, with periods of subsequent trace aspiration. Pt was also noted to have persistent, moderate residue in the vallecula, lateral channels, and pyriform sinuses, which resulted in penetration during the time of the pudding thick trial, as well as occasionally throughout the remainder of the study. Moderate oropharygeal residue was noted, with a mild amount in the laryngeal vestibule from the thin liquid trial. RECOMMENDATIONS: Continue current diet of mechanical soft diet consistency with honey thick liquid consistency, downgrade to pureed diet consistency if concerns; 1:1 feeds with staff or family, monitor for impulsivity; RN to monitor for increased lung congestion; meds whole or crushed in pudding/applesauce; ok for ice chips or small spoonfuls of water in between meals, being at least 30 minutes following any other PO intake. ST to follow and tx for dysphagia. Thanks!           SLP GOALS     Row Name 04/12/18 1430 04/12/18 0945          Oral Nutrition/Hydration Goal 1 (SLP)    Oral Nutrition/Hydration Goal 1, SLP Pt will tolerate trials of recommended anad upgraded diet consistencies without overt s/s of aspiration.   -TM (P)  LTG: Pt will tolerate complete trials of LRD without any s/s of aspiration.   -DS     Time Frame (Oral Nutrition/Hydration Goal 1, SLP) by discharge  -TM (P)  by discharge  -DS     Barriers (Oral Nutrition/Hydration Goal 1, SLP) cognition  -TM (P)  cognition  -DS     Progress/Outcomes (Oral Nutrition/Hydration Goal 1, SLP) goal ongoing  -TM (P)  goal ongoing  -DS        Labial Strengthening Goal 1 (SLP)    Activity (Labial Strengthening Goal 1, SLP) increase labial tone  -TM  --     Increase Labial Tone labial resistance exercises  -TM  --     [place]/Accuracy (Labial Strengthening Goal 1, SLP) with minimal cues (75-90% accuracy)  -TM  --     Time Frame (Labial Strengthening Goal 1, SLP) by discharge  -TM  --      Barriers (Labial Strengthening Goal 1, SLP) Cognition  -TM  --     Progress/Outcomes (Labial Strengthening Goal 1, SLP) goal ongoing  -TM  --        Lingual Strengthening Goal 1 (SLP)    Activity (Lingual Strengthening Goal 1, SLP) increase lingual tone/sensation/control/coordination/movement;increase tongue back strength  -TM  --     Increase Lingual Tone/Sensation/Control/Coordination/Movement lingual resistance exercises;lingual movement exercises  -TM  --     Increase Tongue Back Strength lingual movement exercises  -TM  --     [place]/Accuracy (Lingual Strengthening Goal 1, SLP) with minimal cues (75-90% accuracy)  -TM  --     Time Frame (Lingual Strengthening Goal 1, SLP) by discharge  -TM  --     Barriers (Lingual Strengthening Goal 1, SLP) Cognition  -TM  --     Progress/Outcomes (Lingual Strengthening Goal 1, SLP) goal ongoing  -TM  --        Pharyngeal Strengthening Exercise Goal 1 (SLP)    Activity (Pharyngeal Strengthening Goal 1, SLP) increase timing;increase closure at entrance to airway/closure of airway at glottis;increase squeeze/positive pressure generation;increase tongue base retraction  -TM  --     Increase Timing gustatory stimulation (sour/cold)  -TM  --     Increase Closure at Entrance to Airway/Closure of Airway at Glottis --   Repeat vowel  -TM  --     Increase Squeeze/Positive Pressure Generation hard effortful swallow  -TM  --     Increase Tongue Base Retraction derrek  -TM  --     [place]/Accuracy (Pharyngeal Strengthening Goal 1, SLP) with minimal cues (75-90% accuracy)  -TM  --     Time Frame (Pharyngeal Strengthening Goal 1, SLP) by discharge  -TM  --     Barriers (Pharyngeal Strengthening Goal 1, SLP) Cognition  -TM  --     Progress/Outcomes (Pharyngeal Strengthening Goal 1, SLP) goal ongoing  -TM  --       User Key  (r) = Recorded By, (t) = Taken By, (c) = Cosigned By    Initials Name Provider Type    TM Beulah Fraga CCC-SLP Speech and Language Pathologist    ADRIANA CARY  Cam, Speech Therapy Student Speech Therapy Student             SLP Outcome Measures (last 72 hours)      SLP Outcome Measures     Row Name 04/12/18 1430 04/12/18 1300          SLP Outcome Measures    Outcome Measure Used? Adult NOMS  -TM (P)  Adult NOMS  -DS        FCM Scores    FCM Chosen Swallowing  -TM (P)  Swallowing  -DS     Swallowing FCM Score 4  -TM (P)  4  -DS       User Key  (r) = Recorded By, (t) = Taken By, (c) = Cosigned By    Initials Name Effective Dates    TM CHELA Yeboah 08/02/16 -     DS Lam Levy, Speech Therapy Student 03/02/18 -            Time Calculation:         Time Calculation- SLP     Row Name 04/12/18 1556 04/12/18 1306          Time Calculation- SLP    SLP Start Time 1430  -TM 0945  -CS (r) DS (t) CS (c)     SLP Stop Time 1615  -TM 1051  -CS (r) DS (t) CS (c)     SLP Time Calculation (min) 105 min  -TM 66 min  -CS (r) DS (t)     SLP Received On 04/12/18  -TM 04/12/18  -CS (r) DS (t) CS (c)     SLP Goal Re-Cert Due Date 04/22/18  -TM 04/22/18  -CS (r) DS (t) CS (c)       User Key  (r) = Recorded By, (t) = Taken By, (c) = Cosigned By    Initials Name Provider Type    TM LINDSEY YeboahSLP Speech and Language Pathologist    LAURA Jennings, MS CCC-SLP Speech and Language Pathologist    ADRIANA Levy, Speech Therapy Student Speech Therapy Student          Therapy Charges for Today     Code Description Service Date Service Provider Modifiers Qty    94979907264 HC ST SWALLOWING CURRENT STATUS 4/12/2018 CHELA Yeboah GN, CK 1    71552643899 HC ST SWALLOWING PROJECTED 4/12/2018 CHELA Yeboah, CJ 1    21982549236 HC ST MOTION FLUORO EVAL SWALLOW 7 4/12/2018 CHELA Yeboah GN 1          SLP G-Codes  SLP NOMS Used?: Yes  Functional Limitations: Swallowing  Swallow Current Status (): At least 40 percent but less than 60 percent impaired, limited or restricted  Swallow Goal Status (): At least 20 percent but less than 40 percent  impaired, limited or restricted    Beulah Fraga, CCC-SLP  4/12/2018

## 2018-04-12 NOTE — PROGRESS NOTES
[facility] Medicine Services  INPATIENT PROGRESS NOTE    Length of Stay: 1  Date of Admission: 4/11/2018  Primary Care Physician: Juno Moscoso MD    Subjective   Chief Complaint: SOA  HPI   The patient is pleasantly confused  He denies CP or SOA  No fever  Hgb down 0.6.  No black or bloody stools          Review of Systems   Unable to perform ROS: Dementia      All pertinent negatives and positives are as above. All other systems have been reviewed and are negative unless otherwise stated.     Objective    Temp:  [97.4 °F (36.3 °C)-98.2 °F (36.8 °C)] 98.1 °F (36.7 °C)  Heart Rate:  [67-89] 81  Resp:  [15-20] 18  BP: ()/(54-76) 168/76  Physical Exam   Constitutional: He appears well-developed and well-nourished.   HENT:   Head: Normocephalic and atraumatic.   Right Ear: External ear normal.   Left Ear: External ear normal.   Nose: Nose normal.   Mouth/Throat: Oropharynx is clear and moist.   Eyes: Conjunctivae and EOM are normal. Pupils are equal, round, and reactive to light. Right eye exhibits no discharge. Left eye exhibits no discharge. No scleral icterus.   Neck: Normal range of motion. Neck supple. No tracheal deviation present. No thyromegaly present.   Cardiovascular: Normal rate, regular rhythm, normal heart sounds and intact distal pulses.  Exam reveals no gallop and no friction rub.    No murmur heard.  Pulmonary/Chest: Effort normal. No stridor. No respiratory distress. He has decreased breath sounds. He has no wheezes. He has rhonchi. He has no rales. He exhibits no tenderness.   Abdominal: Soft. Bowel sounds are normal. He exhibits no distension and no mass. There is no tenderness. There is no rebound and no guarding. No hernia.   Musculoskeletal: Normal range of motion. He exhibits no edema or deformity.   Lymphadenopathy:     He has no cervical adenopathy.   Neurological: He is alert. He has normal reflexes. He is disoriented. He displays normal reflexes.  No cranial nerve deficit. He exhibits normal muscle tone. Coordination normal.   Skin: Skin is warm and dry. No rash noted. No erythema. No pallor.   Psychiatric: He has a normal mood and affect. His behavior is normal. Cognition and memory are impaired.   Vitals reviewed.        Results Review:  I have reviewed the labs, radiology results, and diagnostic studies.    Laboratory Data:     Results from last 7 days  Lab Units 04/12/18  0409 04/11/18  1239   WBC 10*3/mm3 7.03 10.76   HEMOGLOBIN g/dL 10.0* 10.6*   HEMATOCRIT % 31.6* 33.0*   PLATELETS 10*3/mm3 179 210          Results from last 7 days  Lab Units 04/12/18  0409 04/11/18  1330   SODIUM mmol/L 136 135   POTASSIUM mmol/L 4.1 4.3   CHLORIDE mmol/L 100 98   CO2 mmol/L 26.0 31.0   BUN mg/dL 26* 27*   CREATININE mg/dL 1.09 1.15   CALCIUM mg/dL 9.1 9.2   BILIRUBIN mg/dL  --  0.7   ALK PHOS U/L  --  70   ALT (SGPT) U/L  --  17   AST (SGOT) U/L  --  25   GLUCOSE mg/dL 139* 113*       Culture Data:   Blood Culture   Date Value Ref Range Status   04/11/2018 No growth at less than 24 hours  Preliminary   04/11/2018 No growth at less than 24 hours  Preliminary       Radiology Data:   Imaging Results (last 24 hours)     Procedure Component Value Units Date/Time    XR Foot 3+ View Left [[id number]] Collected:  04/11/18 1347     Updated:  04/11/18 1351    Narrative:       EXAMINATION: XR FOOT 3+ VW LEFT-     4/11/2018 1:07 PM CDT     HISTORY: Traumatic injury. Foot pain.     Left foot, 3 views.     Osteopenia.     Intact metatarsals and toes.     Normal talus and calcaneus.     Summary:  1. No acute fracture.  This report was finalized on 04/11/2018 13:48 by Dr. Tommy Rudolph MD.    XR Chest 1 View [[id number]] Collected:  04/11/18 1346     Updated:  04/11/18 1350    Narrative:       EXAMINATION: XR CHEST 1 VW-     4/11/2018 1:07 PM CDT     HISTORY: weak, hypotensive.     One view chest x-ray compared with 03/09/2018.     Magnified heart size.  CABG changes with aortic arch  calcification.  Left cardiac pacer is unchanged.     The lungs show interstitial prominence with mild patchy lower lobe  infiltrate or atelectasis.     There is no pneumothorax.     Summary:  1. Patchy bibasilar infiltrate or atelectasis.  This report was finalized on 04/11/2018 13:47 by Dr. Tommy Rudolph MD.    CT Lumbar Spine Without Contrast [[id number]] Collected:  04/11/18 1331     Updated:  04/11/18 1338    Narrative:       EXAMINATION: CT LUMBAR SPINE WITHOUT IV CONTRAST 4/11/2018     COMPARISON: None.     HISTORY: Male, 82 years-old. fall, recent kyphoplasty     TECHNIQUE: Multiple CT images were obtained of the lumbar spine without  IV contrast. The images were formatted in the axial, coronal and  sagittal planes.     Radiation dose equals  mGy-cm.  Automated exposure control dose  reduction technique was implemented.     FINDINGS:   There is grade 1 anterolisthesis of L3 on L4. Mild straightening of the  normal lordosis of the lumbar spine previous L1 vertebral body  augmentation and 2 column fracture. No additional compression deformity  identified. The prevertebral and posterior paraspinal soft tissues are  unremarkable. There is multilevel degenerative changes, differences and  facet arthropathy. Moderate to severe spinal canal stenosis suspected at  L3-L4.     Vascular calcifications.    Impression:       1.  No acute osseous posttraumatic changes  2.  Additional chronic findings as described above including moderate to  severe spinal canal stenosis suspected at L3-L4.        This report was finalized on 04/11/2018 13:35 by Dr. Rachele Hadley MD.    CT Abdomen Pelvis Stone Protocol [[id number]] Collected:  04/11/18 1331     Updated:  04/11/18 1337    Narrative:       EXAMINATION: CT ABDOMEN PELVIS STONE PROTOCOL-      4/11/2018 12:55 PM CDT     HISTORY: fall, flank pain     In order to have a CT radiation dose as low as reasonably achievable  Automated Exposure Control was utilized for adjustment  of the mA and/or  KV according to patient size.     DLP in mGycm= 305.     Axial, sagittal, and coronal noncontrast CT imaging of the  abdomen/pelvis.     Cardiomegaly. Cardiac pacer leads.     Bibasilar infiltrate or atelectasis with trace amounts of pleural fluid.     Normal noncontrast appearance of the liver and gallbladder.  Normal appearance of the pancreas and spleen.  Normal and symmetric adrenal glands and kidneys.  No renal stone or hydronephrosis.     No bowel dilation.  Aortic calcification with no aneurysm.  No pelvic mass or fluid.     L1 compression fracture with kyphoplasty change. No significant  paraspinal soft tissue edema at this level.     Summary:  1. No acute abnormality is seen.                             This report was finalized on 04/11/2018 13:34 by Dr. Tommy Rudolph MD.          I have reviewed the patient current medications.     Assessment/Plan     Hospital Problem List     * (Principal)Healthcare-associated pneumonia    Dehydration    Chronic systolic congestive heart failure    Shortness of breath    V-tach    Other hyperlipidemia    Type 2 diabetes mellitus    Dementia          1.  HCAP  -Vanc/Zosyn  -Blood/sputum cultures  -Nebs    2.  Chronic Systolic CHF  -Coreg    3.  V-Tach  -Amiodarone  -Coreg    4.  HLD  -Lipitor    5.  Insulin Dependent T2DM  -SSI    6.  Dementia    7.  Dehydration  -IVF    8.  HLD  -Lipitor    9.  CAD  -Lipitor  -Coreg  -Asa  -Plavix    10.  CVD   -Lipitor  -Asa  -Plavix    11.  Essential HTN  -Coreg      12.  Chronic Anemia  -monitor H&H    13.  GERD  -Protonix          Discharge Planning: I expect the patient to be discharged to home in 1-2 days.    Ishaan Gong MD   04/12/18   12:38 PM

## 2018-04-12 NOTE — CONSULTS
[facility] HEART GROUP CONSULT NOTE    Referring Provider: No ref. provider found    Reason for Consultation: Congestive Heart Failure, Hypotension    Chief complaint:   Chief Complaint   Patient presents with   • Urinary Retention       Subjective .     History of present illness:  Delon Cid is a 82 y.o. male with history of ischemic cardiomyopathy, systolic congestive heart failure, recurrent Ventricular Tachycardia, AICD, CVA with subdural hematoma, Hyperlipidemia, Hypertension, Diabetes and dementia. Patient recently has been having issues with falls and hypotension. He had a fall requiring kyphoplasty after sustaining a fracture s/p fall. Patient's wife states he went to rehab at [facility] but he didn't receive PT and so his wife decided to pull him out and he has been home. Patient was recently started on Midodrine due to hypotension. Patient's wife reports he was put on it by his cardiology office in [place] at a tapering dose. Patient denies chest pain. Patient complains of shortness of breath, cough, fatigue and confusion. Patient complains of orthopnea. Patient is currently being treated for pneumonia per primary team. Patient was found to be hypotensive yesterday. Patient was given a fluid bolus and coreg held. This am blood pressure is high. History of ischemic cardiomyopathy with EF as low as 25%.     History  Past Medical History:   Diagnosis Date   • CAD (coronary artery disease)    • Cardiomyopathy    • Chronic systolic CHF (congestive heart failure)    • Dementia    • Hypertension    • Insulin dependent diabetes mellitus    • Myocardial infarction    • Stroke    • Sustained VT (ventricular tachycardia)    ,   Past Surgical History:   Procedure Laterality Date   • BACK SURGERY     • CARDIAC DEFIBRILLATOR PLACEMENT     • CAROTID ENDARTERECTOMY Right    • CATARACT EXTRACTION, BILATERAL     • CORONARY ARTERY BYPASS GRAFT     • KYPHOPLASTY     ,   Family History   Problem Relation  Age of Onset   • Heart disease Father    ,   Social History   Substance Use Topics   • Smoking status: Never Smoker   • Smokeless tobacco: Not on file   • Alcohol use No   ,     Medications    Prior to Admission medications    Medication Sig Start Date End Date Taking? Authorizing Provider   amiodarone (PACERONE) 200 MG tablet Take 200 mg by mouth Daily.   Yes Historical Provider, MD   aspirin 81 MG EC tablet Take 1 tablet by mouth daily. 12/18/12  Yes Historical Provider, MD   carvedilol (COREG) 6.25 MG tablet Take 1 tablet by mouth 2 times daily (with meals). 6/26/12  Yes Historical Provider, MD   docusate sodium (COLACE) 100 MG capsule Take 100 mg by mouth 3 (Three) Times a Day As Needed for Constipation.   Yes Historical Provider, MD   ferrous sulfate 325 (65 FE) MG tablet Take 325 mg by mouth Daily With Breakfast.   Yes Historical Provider, MD   folic acid (FOLVITE) 1 MG tablet Take 2 mg by mouth Daily.   Yes Historical Provider, MD   insulin NPH-insulin regular (humuLIN 70/30,novoLIN 70/30) (70-30) 100 UNIT/ML injection Inject 30-40 Units under the skin 2 (Two) Times a Day With Meals.   Yes Historical Provider, MD   levothyroxine (SYNTHROID, LEVOTHROID) 175 MCG tablet Take 175 mcg by mouth Daily.   Yes Historical Provider, MD   midodrine (PROAMATINE) 5 MG tablet Take 5 mg by mouth 3 (Three) Times a Day.   Yes Historical Provider, MD   nitroglycerin (NITROSTAT) 0.4 MG SL tablet Place 0.4 mg under the tongue Every 5 (Five) Minutes As Needed for Chest Pain. Take no more than 3 doses in 15 minutes.   Yes Historical Provider, MD   polyethylene glycol (MIRALAX) packet Take 17 g by mouth Daily As Needed (constipation). 10/26/17  Yes ROCIO Dailey   potassium chloride (K-DUR) 10 MEQ CR tablet Take 10 mEq by mouth 2 (Two) Times a Day.   Yes Historical Provider, MD   traMADol (ULTRAM) 50 MG tablet Take 50 mg by mouth Every 6 (Six) Hours As Needed for Moderate Pain .   Yes Historical Provider, MD   clopidogrel  (PLAVIX) 75 MG tablet Take 75 mg by mouth Daily.    Historical Provider, MD       Current Facility-Administered Medications   Medication Dose Route Frequency Provider Last Rate Last Dose   • acetaminophen (TYLENOL) tablet 650 mg  650 mg Oral Q4H PRN ROCIO Hatch       • amiodarone (PACERONE) tablet 200 mg  200 mg Oral Daily Cari Ayoub APRN   200 mg at 04/12/18 0943   • aspirin EC tablet 81 mg  81 mg Oral Daily Cari Ayoub APRN   81 mg at 04/12/18 0943   • clopidogrel (PLAVIX) tablet 75 mg  75 mg Oral Daily Cari Ayoub APRN   75 mg at 04/12/18 0943   • dextrose (D50W) solution 25 g  25 g Intravenous Q15 Min PRN Cari Ayoub APRTAMIA       • dextrose (GLUTOSE) oral gel 15 g  15 g Oral Q15 Min PRN Cari Ayoub APRN       • docusate sodium (COLACE) capsule 100 mg  100 mg Oral TID PRN Cari Ayoub APRTAMIA       • enoxaparin (LOVENOX) syringe 40 mg  40 mg Subcutaneous Q24H Cari Ayoub APRN   40 mg at 04/11/18 1834   • ferrous sulfate tablet 325 mg  325 mg Oral Daily With Breakfast aCri Ayoub APRN   325 mg at 04/12/18 0943   • folic acid (FOLVITE) tablet 2 mg  2 mg Oral Daily Cari Ayoub APRN   2 mg at 04/12/18 0943   • glucagon (human recombinant) (GLUCAGEN DIAGNOSTIC) injection 1 mg  1 mg Subcutaneous PRN Cari Ayoub APRTAMIA       • HYDROcodone-acetaminophen (NORCO) 5-325 MG per tablet 1 tablet  1 tablet Oral Q4H PRN Cari Ayoub APRN   1 tablet at 04/11/18 2111   • insulin lispro (humaLOG) injection 0-9 Units  0-9 Units Subcutaneous 4x Daily With Meals & Nightly ROCIO Hatch   2 Units at 04/12/18 0942   • ipratropium-albuterol (DUO-NEB) nebulizer solution 3 mL  3 mL Nebulization Q6H While Awake - RT ROCIO Hatch   3 mL at 04/12/18 0628   • midodrine (PROAMATINE) tablet 5 mg  5 mg Oral TID Cari Ayoub APRN   5 mg at 04/12/18 0942   • ondansetron (ZOFRAN) tablet 4 mg  4 mg Oral Q6H PRN ROCIO Hatch        Or   • ondansetron  ODT (ZOFRAN-ODT) disintegrating tablet 4 mg  4 mg Oral Q6H PRN ROCIO Hatch        Or   • ondansetron (ZOFRAN) injection 4 mg  4 mg Intravenous Q6H PRN ROCIO Hatch       • pantoprazole (PROTONIX) EC tablet 40 mg  40 mg Oral QAM Cari Ayoub APRN   40 mg at 04/12/18 0942   • piperacillin-tazobactam (ZOSYN) 4.5 g in iso-osmotic dextrose 100 mL IVPB (premix)  4.5 g Intravenous Q8H Cari Ayoub APRN   4.5 g at 04/12/18 0943   • sodium chloride 0.9 % flush 1-10 mL  1-10 mL Intravenous PRN ROCIO Hatch       • sodium chloride 0.9 % flush 10 mL  10 mL Intravenous PRN Sonia Wong MD       • traMADol (ULTRAM) tablet 50 mg  50 mg Oral Q6H PRN ROCIO Hatch       • vancomycin 1250 mg/250 mL 0.9% NS IVPB ([facility])  1,250 mg Intravenous Q24H ROCIO Hatch   Stopped at 04/11/18 2315       Allergies:  Review of patient's allergies indicates no known allergies.    Review of Systems  Review of Systems   Constitution: Positive for weakness and malaise/fatigue. Negative for chills, decreased appetite, fever, weight gain and weight loss.   HENT: Negative for nosebleeds.    Eyes: Negative for visual disturbance.   Cardiovascular: Positive for orthopnea. Negative for chest pain, dyspnea on exertion, leg swelling, near-syncope, palpitations, paroxysmal nocturnal dyspnea and syncope.   Respiratory: Positive for cough and shortness of breath. Negative for hemoptysis and snoring.    Endocrine: Negative for cold intolerance and heat intolerance.   Hematologic/Lymphatic: Negative for bleeding problem. Does not bruise/bleed easily.   Skin: Negative for rash.   Musculoskeletal: Positive for falls. Negative for back pain.   Gastrointestinal: Negative for abdominal pain, constipation, diarrhea, heartburn, melena, nausea and vomiting.   Genitourinary: Negative for hematuria.   Neurological: Negative for dizziness, headaches and light-headedness.   Psychiatric/Behavioral: Negative for  "altered mental status.   Allergic/Immunologic: Negative for persistent infections.       Objective     Physical Exam:  Patient Vitals for the past 24 hrs:   BP Temp Temp src Pulse Resp SpO2 Height Weight   04/12/18 0737 150/72 97.9 °F (36.6 °C) Oral 76 18 95 % - -   04/12/18 0633 - - - 74 18 - - -   04/12/18 0628 - - - 76 16 93 % - -   04/12/18 0401 143/59 97.6 °F (36.4 °C) Temporal Art 68 15 95 % - -   04/11/18 2358 132/54 98.2 °F (36.8 °C) Temporal Art 68 15 92 % - -   04/11/18 2203 - - - 75 18 96 % - -   04/11/18 2153 - - - 73 18 93 % - -   04/11/18 2110 138/66 98.1 °F (36.7 °C) Temporal Art 74 20 97 % - -   04/11/18 1619 93/65 97.4 °F (36.3 °C) Temporal Art 73 18 99 % 180.3 cm (71\") 71.7 kg (158 lb)   04/11/18 1532 (!) 84/68 - - 68 16 93 % - -   04/11/18 1432 (!) 82/60 - - 67 15 94 % - -   04/11/18 1430 - - - 67 - 91 % - -   04/11/18 1312 (!) 76/62 - - 69 - - - -   04/11/18 1214 - - - 59 - - - -   04/11/18 1213 (!) 84/62 97.5 °F (36.4 °C) Temporal Art (!) 39 12 92 % 180.3 cm (71\") 81.6 kg (180 lb)     Physical Exam   Constitutional: He is oriented to person, place, and time. He appears well-developed and well-nourished.   HENT:   Head: Normocephalic and atraumatic.   Eyes: Pupils are equal, round, and reactive to light.   Neck: Normal range of motion. Neck supple. No JVD present. Carotid bruit is not present.   Cardiovascular: Normal rate, regular rhythm, normal heart sounds and intact distal pulses.    Pulmonary/Chest: Effort normal and breath sounds normal.   Coarse breath sounds   Abdominal: Soft. Bowel sounds are normal.   Musculoskeletal: Normal range of motion. Edema: trace lower leg edema.   Neurological: He is alert and oriented to person, place, and time. He has normal reflexes.   Skin: Skin is warm and dry.   Psychiatric: He has a normal mood and affect. His behavior is normal. Judgment and thought content normal.       Results Review:   I reviewed the patient's new clinical results.  Lab Results (last " 72 hours)     Procedure Component Value Units Date/Time    POC Glucose Once [[id number]]  (Abnormal) Collected:  04/12/18 0738    Specimen:  Blood Updated:  04/12/18 0749     Glucose 167 (H) mg/dL      Comment: : [id number] Nairer ID: [id number]       Respiratory Culture - Sputum, Cough [[id number]] Collected:  04/12/18 0639    Specimen:  Sputum from Cough Updated:  04/12/18 0645    Basic Metabolic Panel [[id number]]  (Abnormal) Collected:  04/12/18 0409    Specimen:  Blood Updated:  04/12/18 0509     Glucose 139 (H) mg/dL      BUN 26 (H) mg/dL      Creatinine 1.09 mg/dL      Sodium 136 mmol/L      Potassium 4.1 mmol/L      Chloride 100 mmol/L      CO2 26.0 mmol/L      Calcium 9.1 mg/dL      eGFR Non African Amer 65 mL/min/1.73      BUN/Creatinine Ratio 23.9     Anion Gap 10.0 mmol/L     Narrative:       The MDRD GFR formula is only valid for adults with stable renal function between ages 18 and 70.    CBC (No Diff) [[id number]]  (Abnormal) Collected:  04/12/18 0409    Specimen:  Blood Updated:  04/12/18 0503     WBC 7.03 10*3/mm3      RBC 3.39 (L) 10*6/mm3      Hemoglobin 10.0 (L) g/dL      Hematocrit 31.6 (L) %      MCV 93.2 fL      MCH 29.5 pg      MCHC 31.6 (L) g/dL      RDW 14.0 %      RDW-SD 47.0 fl      MPV 10.8 fL      Platelets 179 10*3/mm3     Blood Culture - Blood, Blood, Venous Line [[id number]]  (Normal) Collected:  04/11/18 1325    Specimen:  Blood from Arm, Left Updated:  04/12/18 0206     Blood Culture No growth at less than 24 hours    Blood Culture - Blood, Blood, Venous Line [[id number]]  (Normal) Collected:  04/11/18 1329    Specimen:  Blood from Arm, Left Updated:  04/12/18 0206     Blood Culture No growth at less than 24 hours    POC Glucose Once [[id number]]  (Abnormal) Collected:  04/11/18 2057    Specimen:  Blood Updated:  04/11/18 2109     Glucose 235 (H) mg/dL      Comment: : [id number] [place] SusanMeter ID: [id number]       Legionella Antigen, Urine - Urine, Urine, Clean  Catch [[id number]]  (Normal) Collected:  04/11/18 1401    Specimen:  Urine from Urine, Clean Catch Updated:  04/11/18 1809     LEGIONELLA ANTIGEN, URINE Negative    S. Pneumo Ag Urine or CSF - Urine, Urine, Clean Catch [[id number]]  (Normal) Collected:  04/11/18 1401    Specimen:  Urine from Urine, Clean Catch Updated:  04/11/18 1809     Strep Pneumo Ag Negative    Hemoglobin A1c [[id number]] Collected:  04/11/18 1239    Specimen:  Blood Updated:  04/11/18 1737     Hemoglobin A1C 12.2 %     Narrative:       Less than 6.0           Non-Diabetic Range  6.0-7.0                 ADA Therapeutic Target  Greater than 7.0        Action Suggested    Urinalysis With / Culture If Indicated - Urine, Clean Catch [[id number]]  (Abnormal) Collected:  04/11/18 1401    Specimen:  Urine from Urine, Clean Catch Updated:  04/11/18 1413     Color, UA Dark Yellow (A)     Appearance, UA Clear     pH, UA <=5.0     Specific Gravity, UA >1.030 (H)     Glucose,  mg/dL (1+) (A)     Ketones, UA Trace (A)     Bilirubin, UA Small (1+) (A)     Blood, UA Negative     Protein, UA Trace (A)     Leuk Esterase, UA Negative     Nitrite, UA Negative     Urobilinogen, UA 1.0 E.U./dL    Narrative:       Urine microscopic not indicated.    Comprehensive Metabolic Panel [[id number]]  (Abnormal) Collected:  04/11/18 1330    Specimen:  Blood Updated:  04/11/18 1351     Glucose 113 (H) mg/dL      BUN 27 (H) mg/dL      Creatinine 1.15 mg/dL      Sodium 135 mmol/L      Potassium 4.3 mmol/L      Chloride 98 mmol/L      CO2 31.0 mmol/L      Calcium 9.2 mg/dL      Total Protein 6.2 (L) g/dL      Albumin 3.30 (L) g/dL      ALT (SGPT) 17 U/L      AST (SGOT) 25 U/L      Alkaline Phosphatase 70 U/L      Total Bilirubin 0.7 mg/dL      eGFR Non African Amer 61 mL/min/1.73      Globulin 2.9 gm/dL      A/G Ratio 1.1 g/dL      BUN/Creatinine Ratio 23.5     Anion Gap 6.0 mmol/L     Narrative:       The MDRD GFR formula is only valid for adults with stable renal function  between ages 18 and 70.    CBC & Differential [[id number]] Collected:  04/11/18 1239    Specimen:  Blood Updated:  04/11/18 1303    Narrative:       The following orders were created for panel order CBC & Differential.  Procedure                               Abnormality         Status                     ---------                               -----------         ------                     CBC Auto Differential[[id number]]        Abnormal            Final result                 Please view results for these tests on the individual orders.    CBC Auto Differential [[id number]]  (Abnormal) Collected:  04/11/18 1239    Specimen:  Blood Updated:  04/11/18 1303     WBC 10.76 10*3/mm3      RBC 3.56 (L) 10*6/mm3      Hemoglobin 10.6 (L) g/dL      Hematocrit 33.0 (L) %      MCV 92.7 fL      MCH 29.8 pg      MCHC 32.1 (L) g/dL      RDW 13.9 %      RDW-SD 46.9 fl      MPV 10.6 fL      Platelets 210 10*3/mm3      Neutrophil % 81.1 (H) %      Lymphocyte % 10.3 (L) %      Monocyte % 6.6 %      Eosinophil % 0.7 %      Basophil % 0.5 %      Immature Grans % 0.8 %      Neutrophils, Absolute 8.73 (H) 10*3/mm3      Lymphocytes, Absolute 1.11 10*3/mm3      Monocytes, Absolute 0.71 10*3/mm3      Eosinophils, Absolute 0.07 10*3/mm3      Basophils, Absolute 0.05 10*3/mm3      Immature Grans, Absolute 0.09 (H) 10*3/mm3      nRBC 0.0 /100 WBC     Lactic Acid, Plasma [[id number]]  (Normal) Collected:  04/11/18 1239    Specimen:  Blood Updated:  04/11/18 1300     Lactate 1.4 mmol/L     POC Glucose Once [[id number]]  (Normal) Collected:  04/11/18 1233    Specimen:  Blood Updated:  04/11/18 1246     Glucose 105 mg/dL      Comment: : [id number] Rodriguez ID: [id number]             No results found for: ECHOEFEST    Imaging Results (last 72 hours)     Procedure Component Value Units Date/Time    XR Foot 3+ View Left [[id number]] Collected:  04/11/18 1347     Updated:  04/11/18 1351    Narrative:       EXAMINATION: XR FOOT 3+ VW  LEFT-     4/11/2018 1:07 PM CDT     HISTORY: Traumatic injury. Foot pain.     Left foot, 3 views.     Osteopenia.     Intact metatarsals and toes.     Normal talus and calcaneus.     Summary:  1. No acute fracture.  This report was finalized on 04/11/2018 13:48 by Dr. Tommy Rudolph MD.    XR Chest 1 View [[id number]] Collected:  04/11/18 1346     Updated:  04/11/18 1350    Narrative:       EXAMINATION: XR CHEST 1 VW-     4/11/2018 1:07 PM CDT     HISTORY: weak, hypotensive.     One view chest x-ray compared with 03/09/2018.     Magnified heart size.  CABG changes with aortic arch calcification.  Left cardiac pacer is unchanged.     The lungs show interstitial prominence with mild patchy lower lobe  infiltrate or atelectasis.     There is no pneumothorax.     Summary:  1. Patchy bibasilar infiltrate or atelectasis.  This report was finalized on 04/11/2018 13:47 by Dr. Tommy Rudolph MD.    CT Lumbar Spine Without Contrast [[id number]] Collected:  04/11/18 1331     Updated:  04/11/18 1338    Narrative:       EXAMINATION: CT LUMBAR SPINE WITHOUT IV CONTRAST 4/11/2018     COMPARISON: None.     HISTORY: Male, 82 years-old. fall, recent kyphoplasty     TECHNIQUE: Multiple CT images were obtained of the lumbar spine without  IV contrast. The images were formatted in the axial, coronal and  sagittal planes.     Radiation dose equals  mGy-cm.  Automated exposure control dose  reduction technique was implemented.     FINDINGS:   There is grade 1 anterolisthesis of L3 on L4. Mild straightening of the  normal lordosis of the lumbar spine previous L1 vertebral body  augmentation and 2 column fracture. No additional compression deformity  identified. The prevertebral and posterior paraspinal soft tissues are  unremarkable. There is multilevel degenerative changes, differences and  facet arthropathy. Moderate to severe spinal canal stenosis suspected at  L3-L4.     Vascular calcifications.    Impression:       1.  No acute  osseous posttraumatic changes  2.  Additional chronic findings as described above including moderate to  severe spinal canal stenosis suspected at L3-L4.        This report was finalized on 04/11/2018 13:35 by Dr. Rachele Hadley MD.    CT Abdomen Pelvis Stone Protocol [[id number]] Collected:  04/11/18 1331     Updated:  04/11/18 1337    Narrative:       EXAMINATION: CT ABDOMEN PELVIS STONE PROTOCOL-      4/11/2018 12:55 PM CDT     HISTORY: fall, flank pain     In order to have a CT radiation dose as low as reasonably achievable  Automated Exposure Control was utilized for adjustment of the mA and/or  KV according to patient size.     DLP in mGycm= 305.     Axial, sagittal, and coronal noncontrast CT imaging of the  abdomen/pelvis.     Cardiomegaly. Cardiac pacer leads.     Bibasilar infiltrate or atelectasis with trace amounts of pleural fluid.     Normal noncontrast appearance of the liver and gallbladder.  Normal appearance of the pancreas and spleen.  Normal and symmetric adrenal glands and kidneys.  No renal stone or hydronephrosis.     No bowel dilation.  Aortic calcification with no aneurysm.  No pelvic mass or fluid.     L1 compression fracture with kyphoplasty change. No significant  paraspinal soft tissue edema at this level.     Summary:  1. No acute abnormality is seen.     This report was finalized on 04/11/2018 13:34 by Dr. Tommy Rudolph MD.        Assessment/Plan     Principal Problem:    Healthcare-associated pneumonia  Active Problems:    Dehydration    Chronic systolic congestive heart failure    Shortness of breath    V-tach    Other hyperlipidemia    Type 2 diabetes mellitus    Dementia    Plan:  We were consulted to assist with hypotension. Patient was given an IV bolus yesterday and coreg was held. Blood Pressure has recovered, in fact is high this morning. Would recommend resuming coreg and close monitoring of blood pressure. There is some confusion on Midodrine order per his wife- she reports  he is taking a twice a day and next week he is to go down to once a day. Will ask nurse to contact.   Patient is followed by Dr. Miller at [place] in [place]  Antibiotics per primary team  Will order 2 View chest x-ray today- also mentioned in primary team's note yesterday  Echo is pending- previous EF 25%- most recent was 45%  Further orders per Dr. Chin  Would not recommend holding amiodarone as has little blood pressure effect. In the past with holding amiodarone patient had recurrent V-Tach  Low Salt Diet, Daily Weights, Strict I&O    Thank you for asking us to follow this patient with you.     Layo Amin, ROCIO  04/12/18  10:06 AM

## 2018-04-12 NOTE — PLAN OF CARE
Problem: Patient Care Overview  Goal: Plan of Care Review  Outcome: Ongoing (interventions implemented as appropriate)   04/12/18 5204   OTHER   Outcome Summary VSS, BP has increased. V paced 68-73. c/o right shoulder pain, treated with prn pain meds with relief. recieving iv antibioticcs as ordered. continue to monitor     Goal: Individualization and Mutuality  Outcome: Ongoing (interventions implemented as appropriate)    Goal: Discharge Needs Assessment  Outcome: Ongoing (interventions implemented as appropriate)    Goal: Interprofessional Rounds/Family Conf  Outcome: Ongoing (interventions implemented as appropriate)      Problem: Fluid Volume Deficit (Adult)  Goal: Optimal Fluid Balance  Outcome: Ongoing (interventions implemented as appropriate)      Problem: Fall Risk (Adult)  Goal: Absence of Fall  Outcome: Ongoing (interventions implemented as appropriate)      Problem: Wound (Includes Pressure Injury) (Adult)  Goal: Signs and Symptoms of Listed Potential Problems Will be Absent, Minimized or Managed (Wound)  Outcome: Ongoing (interventions implemented as appropriate)      Problem: Skin Injury Risk (Adult)  Goal: Identify Related Risk Factors and Signs and Symptoms  Outcome: Ongoing (interventions implemented as appropriate)    Goal: Skin Health and Integrity  Outcome: Ongoing (interventions implemented as appropriate)

## 2018-04-12 NOTE — PLAN OF CARE
Problem: Patient Care Overview  Goal: Plan of Care Review  Outcome: Ongoing (interventions implemented as appropriate)   04/12/18 9512   Coping/Psychosocial   Plan of Care Reviewed With patient;spouse   Plan of Care Review   Progress no change   OTHER   Outcome Summary CBSE completed. When Speech Therapy arrived pt was eating breakfast, regular and thin liquid consistency. The pt's wife was prompting him to swallow and pace during meal. Pt completed trials of puree, regular and honey, nectar, thin liquids. Pt exhibited coughing as well as throat clearing after consuming thin and nectar. Pt exhibited oral holding with the trials of regular and with his breakfast. Pt exhibited prolonged mastication. After trials of regular pt had mild residue in the oral cavity and was prompted to use a liquid wash to clear. SLP recommending mech soft and honey thick liquids until a dysphagia study can be completed. Meds okay whole in applesause or with thick liquids. SLP will continue to follow and treat.

## 2018-04-12 NOTE — THERAPY EVALUATION
Acute Care - Speech Language Pathology   Swallow Initial Evaluation [facility]     Patient Name: Delon Cid  : 1935  MRN: [id number]  Today's Date: 2018               Admit Date: 2018  CBSE completed. When Speech Therapy arrived pt was eating breakfast, regular and thin liquid consistency. The pt's wife was prompting him to swallow and pace during meal. Pt completed trials of puree, regular and honey, nectar, thin liquids. Pt exhibited coughing as well as throat clearing after consuming thin and nectar. Pt exhibited oral holding with the trials of regular and with his breakfast. Pt exhibited prolonged mastication. After trials of regular pt had mild residue in the oral cavity and was prompted to use a liquid wash to clear. SLP recommending mech soft and honey thick liquids until a dysphagia study can be completed. Meds okay whole in applesause or with thick liquids. SLP will continue to follow and treat.   Lam Levy, Speech Therapy Student 2018 1:09 PM  Visit Dx:     ICD-10-CM ICD-9-CM   1. Dehydration E86.0 276.51   2. Hypotension, unspecified hypotension type I95.9 458.9   3. Oropharyngeal dysphagia R13.12 787.22     Patient Active Problem List   Diagnosis   • Dehydration   • Healthcare-associated pneumonia   • Chronic systolic congestive heart failure   • Shortness of breath   • V-tach   • Other hyperlipidemia   • Type 2 diabetes mellitus   • Dementia     Past Medical History:   Diagnosis Date   • CAD (coronary artery disease)    • Cardiomyopathy    • Chronic systolic CHF (congestive heart failure)    • Dementia    • Hypertension    • Insulin dependent diabetes mellitus    • Myocardial infarction    • Stroke    • Sustained VT (ventricular tachycardia)      Past Surgical History:   Procedure Laterality Date   • BACK SURGERY     • CARDIAC DEFIBRILLATOR PLACEMENT     • CAROTID ENDARTERECTOMY Right    • CATARACT EXTRACTION, BILATERAL     • CORONARY ARTERY BYPASS GRAFT     •  KYPHOPLASTY            SWALLOW EVALUATION (last 72 hours)      SLP Adult Swallow Evaluation     Row Name 04/12/18 0945                   Rehab Evaluation    Document Type (P)  evaluation  -DS        Patient Observations (P)  cooperative  -DS        Patient/Family Observations (P)  Wife present during evaluation.  -DS        Patient Effort (P)  adequate  -DS           General Information    Patient Profile Reviewed (P)  yes  -DS        Pertinent History Of Current Problem (P)  History of pneumonia; stroke. Chest xray results; mild patchy lower lobe infiltrate.  -DS        Current Method of Nutrition (P)  regular textures;thin liquids  -DS        Precautions/Limitations, Vision (P)  WFL;for purposes of eval  -DS        Precautions/Limitations, Hearing (P)  hearing impairment, bilaterally  -DS        Prior Level of Function-Communication (P)  cognitive-linguistic impairment  -DS        Prior Level of Function-Swallowing (P)  no diet consistency restrictions  -DS        Plans/Goals Discussed with (P)  patient;spouse/S.O.  -DS        Barriers to Rehab (P)  cognitive status  -DS        Patient's Goals for Discharge (P)  patient did not state  -DS        Family Goals for Discharge (P)  family did not state  -DS           Pain Assessment    Additional Documentation (P)  Pain Scale: FACES Pre/Post-Treatment (Group)  -DS           Pain Scale: FACES Pre/Post-Treatment    Pain: FACES Scale, Pretreatment (P)  0-->no hurt  -DS        Pain: FACES Scale, Post-Treatment (P)  0-->no hurt  -DS           Oral Motor and Function    Dentition Assessment (P)  natural, present and adequate  -DS        Secretion Management (P)  WNL/WFL  -DS        Mucosal Quality (P)  moist, healthy  -DS        Volitional Swallow (P)  WFL  -DS        Volitional Cough (P)  WFL  -DS           Oral Musculature and Cranial Nerve Assessment    Oral Motor General Assessment (P)  WFL  -DS           General Eating/Swallowing Observations    Respiratory Support  Currently in Use (P)  room air  -DS        Eating/Swallowing Skills (P)  self-fed;fed by SLP  -DS        Positioning During Eating (P)  upright in bed  -DS        Utensils Used (P)  spoon;straw  -DS        Consistencies Trialed (P)  regular textures;pureed;thin liquids;nectar/syrup-thick liquids;honey-thick liquids  -DS           Clinical Swallow Eval    Oral Prep Phase (P)  impaired  -DS        Oral Transit (P)  WFL  -DS        Oral Residue (P)  impaired  -DS        Pharyngeal Phase (P)  suspected pharyngeal impairment  -DS        Esophageal Phase (P)  unremarkable  -DS        Clinical Swallow Evaluation Summary (P)  CBSE completed. When Speech Therapy arrived pt was eating breakfast, regular and thin liquid consistency. The pt's wife was prompting him to swallow and pace during meal. Pt completed trials of puree, regular and honey, nectar, thin liquids. Pt exhibited coughing as well as throat clearing after consuming thin and nectar. Pt exhibited oral holding with the trials of regular and with his breakfast. Pt exhibited prolonged mastication. After trials of regular pt had mild residue in the oral cavity and was prompted to use a liquid wash to clear. SLP recommending  mech soft and honey thick liquids until a dysphagia study can be completed. Meds okay whole in applesause or with thick liquids. SLP will continue to follow and treat.   -DS           Oral Prep Concerns    Oral Prep Concerns (P)  oral holding;prolonged mastication  -DS        Oral Holding (P)  all consistencies  -DS        Prolonged Mastication (P)  pudding;regular consistencies  -DS        Oral Prep Concerns, Comment (P)  Pt exhibited oral holding when consuming he breakfast of regular consistencies and prolonged chewing when presented with trials of regular.   -DS           Oral Residue Concerns    Oral Residue Concerns (P)  diffuse residue throughout oral cavity  -DS        Diffuse Residue Throughout Oral Cavity (P)  regular consistencies  -DS         Oral Residue Concerns, Comment (P)  Mild amout of oral residue in the oral cavity.   -DS           Pharyngeal Phase Concerns    Pharyngeal Phase Concerns (P)  cough;throat clear  -DS        Cough (P)  thin  -DS        Throat Clear (P)  thin;nectar  -DS        Pharyngeal Phase Concerns, Comment (P)  During the CBSE the pt exhibited throat clear with the nectar thick liquids and coughed when presented with the thin liquids.   -DS           Clinical Impression    SLP Swallowing Diagnosis (P)  mild;oral dysfunction;suspected pharyngeal dysfunction  -DS        Functional Impact (P)  risk of aspiration/pneumonia;risk of malnutrition;risk of dehydration  -DS        Rehab Potential/Prognosis, Swallowing (P)  good, to achieve stated therapy goals  -DS        Criteria for Skilled Therapeutic Interventions Met (P)  demonstrates skilled criteria  -DS           Recommendations    Therapy Frequency (Swallow) (P)  at least;3 days per week  -DS        Predicted Duration Therapy Intervention (Days) (P)  until discharge  -DS        SLP Diet Recommendation (P)  mechanical soft with no mixed consistencies;honey thick liquids  -DS        Recommended Diagnostics (P)  VFSS (MBS)  -DS        Recommended Precautions and Strategies (P)  upright posture during/after eating;small bites of food and sips of liquid  -DS        SLP Rec. for Method of Medication Administration (P)  meds whole;with pudding or applesauce;with thick liquids  -DS        Monitor for Signs of Aspiration (P)  notify SLP if any concerns  -DS        Anticipated Dischage Disposition (P)  unknown  -DS           Swallow Goals (SLP)    Oral Nutrition/Hydration Goal Selection (SLP) (P)  oral nutrition/hydration, SLP goal 1  -DS           Oral Nutrition/Hydration Goal 1 (SLP)    Oral Nutrition/Hydration Goal 1, SLP (P)  LTG: Pt will tolerate complete trials of LRD without any s/s of aspiration.   -DS        Time Frame (Oral Nutrition/Hydration Goal 1, SLP) (P)  by discharge   -DS        Barriers (Oral Nutrition/Hydration Goal 1, SLP) (P)  cognition  -DS        Progress/Outcomes (Oral Nutrition/Hydration Goal 1, SLP) (P)  goal ongoing  -DS          User Key  (r) = Recorded By, (t) = Taken By, (c) = Cosigned By    Initials Name Effective Dates    DS Lam Levy, Speech Therapy Student 03/02/18 -         EDUCATION  The patient has been educated in the following areas:   Dysphagia (Swallowing Impairment).    SLP Recommendation and Plan  SLP Swallowing Diagnosis: (P) mild, oral dysfunction, suspected pharyngeal dysfunction  SLP Diet Recommendation: (P) mechanical soft with no mixed consistencies, honey thick liquids  Recommended Precautions and Strategies: (P) upright posture during/after eating, small bites of food and sips of liquid     Monitor for Signs of Aspiration: (P) notify SLP if any concerns  Recommended Diagnostics: (P) VFSS (MBS)  Criteria for Skilled Therapeutic Interventions Met: (P) demonstrates skilled criteria  Anticipated Dischage Disposition: (P) unknown  Rehab Potential/Prognosis, Swallowing: (P) good, to achieve stated therapy goals  Therapy Frequency (Swallow): (P) at least, 3 days per week  Predicted Duration Therapy Intervention (Days): (P) until discharge       Plan of Care Reviewed With: (P) patient, spouse  Plan of Care Review  Plan of Care Reviewed With: (P) patient, spouse  Progress: (P) no change  Outcome Summary: (P) CBSE completed. When Speech Therapy arrived pt was eating breakfast, regular and thin liquid consistency. The pt's wife was prompting him to swallow and pace during meal. Pt completed trials of puree, regular and honey, nectar, thin liquids. Pt exhibited coughing as well as throat clearing after consuming thin and nectar. Pt exhibited oral holding with the trials of regular and with his breakfast. Pt exhibited prolonged mastication. After trials of regular pt had mild residue in the oral cavity and was prompted to use a liquid wash to clear.  SLP recommending  mech soft and honey thick liquids until a dysphagia study can be completed. Meds okay whole in applesause or with thick liquids. SLP will continue to follow and treat.           SLP GOALS     Row Name 04/12/18 0945             Oral Nutrition/Hydration Goal 1 (SLP)    Oral Nutrition/Hydration Goal 1, SLP (P)  LTG: Pt will tolerate complete trials of LRD without any s/s of aspiration.   -DS      Time Frame (Oral Nutrition/Hydration Goal 1, SLP) (P)  by discharge  -DS      Barriers (Oral Nutrition/Hydration Goal 1, SLP) (P)  cognition  -DS      Progress/Outcomes (Oral Nutrition/Hydration Goal 1, SLP) (P)  goal ongoing  -DS        User Key  (r) = Recorded By, (t) = Taken By, (c) = Cosigned By    Initials Name Provider Type    ADRIANA Levy, Speech Therapy Student Speech Therapy Student             SLP Outcome Measures (last 72 hours)      SLP Outcome Measures     Row Name 04/12/18 1300             SLP Outcome Measures    Outcome Measure Used? (P)  Adult NOMS  -DS         FCM Scores    FCM Chosen (P)  Swallowing  -DS      Swallowing FCM Score (P)  4  -DS        User Key  (r) = Recorded By, (t) = Taken By, (c) = Cosigned By    Initials Name Effective Dates    DS Lam Levy, Speech Therapy Student 03/02/18 -            Time Calculation:         Time Calculation- SLP     Row Name 04/12/18 1306             Time Calculation- SLP    SLP Start Time (P)  0945  -DS      SLP Stop Time (P)  1051  -DS      SLP Time Calculation (min) (P)  66 min  -DS      SLP Received On (P)  04/12/18  -DS      SLP Goal Re-Cert Due Date (P)  04/22/18  -DS        User Key  (r) = Recorded By, (t) = Taken By, (c) = Cosigned By    Initials Name Provider Type    ADRIANA Levy, Speech Therapy Student Speech Therapy Student          Therapy Charges for Today     Code Description Service Date Service Provider Modifiers Qty    [id number] HC ST EVAL ORAL PHARYNG SWALLOW 4 4/12/2018 Lam Levy Speech Therapy Student GN 1           SLP G-Codes  SLP NOMS Used?: (P) Yes  Functional Limitations: (P) Swallowing  Swallow Current Status (): (P) At least 40 percent but less than 60 percent impaired, limited or restricted  Swallow Goal Status (): (P) At least 40 percent but less than 60 percent impaired, limited or restricted    Lam Levy, Speech Therapy Student  4/12/2018

## 2018-04-12 NOTE — PLAN OF CARE
Problem: Patient Care Overview  Goal: Plan of Care Review  Outcome: Ongoing (interventions implemented as appropriate)   04/12/18 5431   Coping/Psychosocial   Plan of Care Reviewed With patient;spouse;family   Plan of Care Review   Progress no change   OTHER   Outcome Summary Pt and family report that pt's appetite has been pretty good. No PO intake recorded at this time. ST has downgraded diet today to soft texture, ground meat, and honey thick liquids per CBSE. Wife reports that she tries to cook healthy at home and that pt does not eat a lot of sugar. He drinks diet soda. Current A1c is 12.2. He declines nutrition supplements at present. Pt has a stage 1 PI to upper gluteal per NN's. Advised pt and family of alternate food selections. Will cont to follow and encourage intake.

## 2018-04-12 NOTE — PLAN OF CARE
Problem: Patient Care Overview  Goal: Plan of Care Review  Outcome: Ongoing (interventions implemented as appropriate)   04/12/18 1337   Coping/Psychosocial   Plan of Care Reviewed With spouse;other (see comments)  (RN)   Plan of Care Review   Progress (Eval)   OTHER   Outcome Summary VFSS completed. Pt was presented the following consistencies in the stated order: honey thick, nectar thick, thin, pudding thick, mechanical soft, and repeat nectar thick trial. Pt was noted to have decreased bolus formation with prolonged oral prep phase at time. Decreased base of tongue strength, resulting in premature loss of the bolus to the pyriform sinuses with thin and nectar thick. Delay in swallow initiation with all presented consistencies. Weak laryngeal elevation, with mildly decreased anterior hyolaryngeal excursion. Poor epiglottic inversion, which was absent at times. Penetration with nectar thick with noted throat clear. Profound penetration with aspiration with thin, again with a throat clear observed, though not successful in clearing residue from the laryngeal vesibule, which remained throughout the duration of the study, with periods of subsequent trace aspiration. Pt was also noted to have persistent, moderate residue in the vallecula, lateral channels, and pyriform sinuses, which resulted in penetration during the time of the pudding thick trial, as well as occasionally throughout the remainder of the study. Moderate oropharygeal residue was noted, with a mild amount in the laryngeal vestibule from the thin liquid trial. RECOMMENDATIONS: Continue current diet of mechanical soft diet consistency with honey thick liquid consistency, downgrade to pureed diet consistency if concerns; 1:1 feeds with staff or family, monitor for impulsivity; RN to monitor for increased lung congestion; meds whole or crushed in pudding/applesauce; ok for ice chips or small spoonfuls of water in between meals, being at least 30 minutes  following any other PO intake. ST to follow and tx for dysphagia. Thanks!

## 2018-04-13 LAB
ALBUMIN SERPL-MCNC: 3.1 G/DL (ref 3.5–5)
ALBUMIN/GLOB SERPL: 1.2 G/DL (ref 1.1–2.5)
ALP SERPL-CCNC: 99 U/L (ref 24–120)
ALT SERPL W P-5'-P-CCNC: 35 U/L (ref 0–54)
ANION GAP SERPL CALCULATED.3IONS-SCNC: 12 MMOL/L (ref 4–13)
AST SERPL-CCNC: 41 U/L (ref 7–45)
BASOPHILS # BLD AUTO: 0.02 10*3/MM3 (ref 0–0.2)
BASOPHILS NFR BLD AUTO: 0.3 % (ref 0–2)
BILIRUB SERPL-MCNC: 0.6 MG/DL (ref 0.1–1)
BUN BLD-MCNC: 18 MG/DL (ref 5–21)
BUN/CREAT SERPL: 19.8 (ref 7–25)
CALCIUM SPEC-SCNC: 9 MG/DL (ref 8.4–10.4)
CHLORIDE SERPL-SCNC: 100 MMOL/L (ref 98–110)
CO2 SERPL-SCNC: 24 MMOL/L (ref 24–31)
CREAT BLD-MCNC: 0.91 MG/DL (ref 0.5–1.4)
DEPRECATED RDW RBC AUTO: 45.3 FL (ref 40–54)
EOSINOPHIL # BLD AUTO: 0.01 10*3/MM3 (ref 0–0.7)
EOSINOPHIL NFR BLD AUTO: 0.2 % (ref 0–4)
ERYTHROCYTE [DISTWIDTH] IN BLOOD BY AUTOMATED COUNT: 13.8 % (ref 12–15)
GFR SERPL CREATININE-BSD FRML MDRD: 80 ML/MIN/1.73
GLOBULIN UR ELPH-MCNC: 2.5 GM/DL
GLUCOSE BLD-MCNC: 207 MG/DL (ref 70–100)
GLUCOSE BLDC GLUCOMTR-MCNC: 151 MG/DL (ref 70–130)
GLUCOSE BLDC GLUCOMTR-MCNC: 194 MG/DL (ref 70–130)
GLUCOSE BLDC GLUCOMTR-MCNC: 226 MG/DL (ref 70–130)
GLUCOSE BLDC GLUCOMTR-MCNC: 281 MG/DL (ref 70–130)
GLUCOSE BLDC GLUCOMTR-MCNC: 291 MG/DL (ref 70–130)
HCT VFR BLD AUTO: 29.6 % (ref 40–52)
HGB BLD-MCNC: 9.7 G/DL (ref 14–18)
IMM GRANULOCYTES # BLD: 0.03 10*3/MM3 (ref 0–0.03)
IMM GRANULOCYTES NFR BLD: 0.5 % (ref 0–5)
LYMPHOCYTES # BLD AUTO: 0.57 10*3/MM3 (ref 0.72–4.86)
LYMPHOCYTES NFR BLD AUTO: 8.6 % (ref 15–45)
MCH RBC QN AUTO: 29.6 PG (ref 28–32)
MCHC RBC AUTO-ENTMCNC: 32.8 G/DL (ref 33–36)
MCV RBC AUTO: 90.2 FL (ref 82–95)
MONOCYTES # BLD AUTO: 0.54 10*3/MM3 (ref 0.19–1.3)
MONOCYTES NFR BLD AUTO: 8.1 % (ref 4–12)
NEUTROPHILS # BLD AUTO: 5.46 10*3/MM3 (ref 1.87–8.4)
NEUTROPHILS NFR BLD AUTO: 82.3 % (ref 39–78)
NRBC BLD MANUAL-RTO: 0 /100 WBC (ref 0–0)
PLATELET # BLD AUTO: 169 10*3/MM3 (ref 130–400)
PMV BLD AUTO: 10.5 FL (ref 6–12)
POTASSIUM BLD-SCNC: 3.6 MMOL/L (ref 3.5–5.3)
PROT SERPL-MCNC: 5.6 G/DL (ref 6.3–8.7)
RBC # BLD AUTO: 3.28 10*6/MM3 (ref 4.8–5.9)
SODIUM BLD-SCNC: 136 MMOL/L (ref 135–145)
WBC NRBC COR # BLD: 6.63 10*3/MM3 (ref 4.8–10.8)

## 2018-04-13 PROCEDURE — 97162 PT EVAL MOD COMPLEX 30 MIN: CPT

## 2018-04-13 PROCEDURE — G8979 MOBILITY GOAL STATUS: HCPCS

## 2018-04-13 PROCEDURE — 63710000001 INSULIN LISPRO (HUMAN) PER 5 UNITS: Performed by: NURSE PRACTITIONER

## 2018-04-13 PROCEDURE — 97166 OT EVAL MOD COMPLEX 45 MIN: CPT | Performed by: OCCUPATIONAL THERAPIST

## 2018-04-13 PROCEDURE — G8978 MOBILITY CURRENT STATUS: HCPCS

## 2018-04-13 PROCEDURE — 99232 SBSQ HOSP IP/OBS MODERATE 35: CPT | Performed by: INTERNAL MEDICINE

## 2018-04-13 PROCEDURE — 25010000002 ENOXAPARIN PER 10 MG: Performed by: NURSE PRACTITIONER

## 2018-04-13 PROCEDURE — 82962 GLUCOSE BLOOD TEST: CPT

## 2018-04-13 PROCEDURE — 92526 ORAL FUNCTION THERAPY: CPT

## 2018-04-13 PROCEDURE — 94799 UNLISTED PULMONARY SVC/PX: CPT

## 2018-04-13 PROCEDURE — G8988 SELF CARE GOAL STATUS: HCPCS | Performed by: OCCUPATIONAL THERAPIST

## 2018-04-13 PROCEDURE — 80053 COMPREHEN METABOLIC PANEL: CPT | Performed by: FAMILY MEDICINE

## 2018-04-13 PROCEDURE — 25010000002 PIPERACILLIN SOD-TAZOBACTAM PER 1 G: Performed by: NURSE PRACTITIONER

## 2018-04-13 PROCEDURE — 85025 COMPLETE CBC W/AUTO DIFF WBC: CPT | Performed by: FAMILY MEDICINE

## 2018-04-13 PROCEDURE — G8987 SELF CARE CURRENT STATUS: HCPCS | Performed by: OCCUPATIONAL THERAPIST

## 2018-04-13 PROCEDURE — 25010000002 VANCOMYCIN 10 G RECONSTITUTED SOLUTION: Performed by: NURSE PRACTITIONER

## 2018-04-13 RX ADMIN — DESMOPRESSIN ACETATE 40 MG: 0.2 TABLET ORAL at 21:39

## 2018-04-13 RX ADMIN — PANTOPRAZOLE SODIUM 40 MG: 40 TABLET, DELAYED RELEASE ORAL at 09:35

## 2018-04-13 RX ADMIN — FOLIC ACID 2 MG: 1 TABLET ORAL at 09:33

## 2018-04-13 RX ADMIN — ASPIRIN 81 MG: 81 TABLET ORAL at 09:32

## 2018-04-13 RX ADMIN — MIDODRINE HYDROCHLORIDE 5 MG: 2.5 TABLET ORAL at 21:39

## 2018-04-13 RX ADMIN — CARVEDILOL 6.25 MG: 6.25 TABLET, FILM COATED ORAL at 21:39

## 2018-04-13 RX ADMIN — IPRATROPIUM BROMIDE AND ALBUTEROL SULFATE 3 ML: 2.5; .5 SOLUTION RESPIRATORY (INHALATION) at 13:47

## 2018-04-13 RX ADMIN — AMIODARONE HYDROCHLORIDE 200 MG: 200 TABLET ORAL at 09:32

## 2018-04-13 RX ADMIN — MIDODRINE HYDROCHLORIDE 5 MG: 2.5 TABLET ORAL at 09:33

## 2018-04-13 RX ADMIN — CARVEDILOL 6.25 MG: 6.25 TABLET, FILM COATED ORAL at 09:32

## 2018-04-13 RX ADMIN — IPRATROPIUM BROMIDE AND ALBUTEROL SULFATE 3 ML: 2.5; .5 SOLUTION RESPIRATORY (INHALATION) at 07:00

## 2018-04-13 RX ADMIN — CLOPIDOGREL 75 MG: 75 TABLET, FILM COATED ORAL at 09:33

## 2018-04-13 RX ADMIN — INSULIN LISPRO 6 UNITS: 100 INJECTION, SOLUTION INTRAVENOUS; SUBCUTANEOUS at 09:32

## 2018-04-13 RX ADMIN — IPRATROPIUM BROMIDE AND ALBUTEROL SULFATE 3 ML: 2.5; .5 SOLUTION RESPIRATORY (INHALATION) at 19:29

## 2018-04-13 RX ADMIN — MIDODRINE HYDROCHLORIDE 5 MG: 2.5 TABLET ORAL at 17:11

## 2018-04-13 RX ADMIN — ENOXAPARIN SODIUM 40 MG: 40 INJECTION SUBCUTANEOUS at 17:10

## 2018-04-13 RX ADMIN — TAZOBACTAM SODIUM AND PIPERACILLIN SODIUM 4.5 G: 500; 4 INJECTION, SOLUTION INTRAVENOUS at 17:11

## 2018-04-13 RX ADMIN — VANCOMYCIN HYDROCHLORIDE 1250 MG: 10 INJECTION, POWDER, LYOPHILIZED, FOR SOLUTION INTRAVENOUS at 17:11

## 2018-04-13 RX ADMIN — HYDROCODONE BITARTRATE AND ACETAMINOPHEN 1 TABLET: 5; 325 TABLET ORAL at 01:39

## 2018-04-13 RX ADMIN — TAZOBACTAM SODIUM AND PIPERACILLIN SODIUM 4.5 G: 500; 4 INJECTION, SOLUTION INTRAVENOUS at 09:35

## 2018-04-13 RX ADMIN — FERROUS SULFATE TAB 325 MG (65 MG ELEMENTAL FE) 325 MG: 325 (65 FE) TAB at 09:32

## 2018-04-13 RX ADMIN — INSULIN LISPRO 2 UNITS: 100 INJECTION, SOLUTION INTRAVENOUS; SUBCUTANEOUS at 13:35

## 2018-04-13 RX ADMIN — INSULIN LISPRO 6 UNITS: 100 INJECTION, SOLUTION INTRAVENOUS; SUBCUTANEOUS at 21:40

## 2018-04-13 NOTE — PROGRESS NOTES
[facility] Medicine Services  INPATIENT PROGRESS NOTE    Length of Stay: 2  Date of Admission: 4/11/2018  Primary Care Physician: Juno Moscoso MD    Subjective   Chief Complaint: Confusion  HPI   He had an eventful night last night  He was very aggitated and confused and tried to jump out of bed  He has been afebrile  No black or bloody stools.            Review of Systems   Unable to perform ROS: Mental status change      All pertinent negatives and positives are as above. All other systems have been reviewed and are negative unless otherwise stated.     Objective    Temp:  [97.9 °F (36.6 °C)-98.9 °F (37.2 °C)] 98.1 °F (36.7 °C)  Heart Rate:  [73-92] 81  Resp:  [18-20] 18  BP: (123-168)/(53-86) 154/86  Physical Exam   Constitutional: He appears well-developed and well-nourished.   HENT:   Head: Normocephalic and atraumatic.   Right Ear: External ear normal.   Left Ear: External ear normal.   Nose: Nose normal.   Mouth/Throat: Oropharynx is clear and moist.   Eyes: Conjunctivae and EOM are normal. Pupils are equal, round, and reactive to light. Right eye exhibits no discharge. Left eye exhibits no discharge. No scleral icterus.   Neck: Normal range of motion. Neck supple. No tracheal deviation present. No thyromegaly present.   Cardiovascular: Normal rate, regular rhythm, normal heart sounds and intact distal pulses.  Exam reveals no gallop and no friction rub.    No murmur heard.  Pulmonary/Chest: Effort normal. No stridor. No respiratory distress. He has decreased breath sounds. He has no wheezes. He has rhonchi. He has no rales. He exhibits no tenderness.   Abdominal: Soft. Bowel sounds are normal. He exhibits no distension and no mass. There is no tenderness. There is no rebound and no guarding. No hernia.   Musculoskeletal: Normal range of motion. He exhibits no edema or deformity.   Lymphadenopathy:     He has no cervical adenopathy.   Neurological: He is alert. He has  normal reflexes. He is disoriented. He displays normal reflexes. No cranial nerve deficit. He exhibits normal muscle tone. Coordination normal.   Skin: Skin is warm and dry. No rash noted. No erythema. No pallor.   Psychiatric: He has a normal mood and affect. He is agitated. Cognition and memory are impaired. He expresses impulsivity.   Vitals reviewed.        Results Review:  I have reviewed the labs, radiology results, and diagnostic studies.    Laboratory Data:     Results from last 7 days  Lab Units 04/12/18  0409 04/11/18  1239   WBC 10*3/mm3 7.03 10.76   HEMOGLOBIN g/dL 10.0* 10.6*   HEMATOCRIT % 31.6* 33.0*   PLATELETS 10*3/mm3 179 210          Results from last 7 days  Lab Units 04/12/18  0409 04/11/18  1330   SODIUM mmol/L 136 135   POTASSIUM mmol/L 4.1 4.3   CHLORIDE mmol/L 100 98   CO2 mmol/L 26.0 31.0   BUN mg/dL 26* 27*   CREATININE mg/dL 1.09 1.15   CALCIUM mg/dL 9.1 9.2   BILIRUBIN mg/dL  --  0.7   ALK PHOS U/L  --  70   ALT (SGPT) U/L  --  17   AST (SGOT) U/L  --  25   GLUCOSE mg/dL 139* 113*       Culture Data:   Blood Culture   Date Value Ref Range Status   04/11/2018 No growth at 24 hours  Preliminary   04/11/2018 No growth at 24 hours  Preliminary     Respiratory Culture   Date Value Ref Range Status   04/12/2018 Light growth (2+) Normal Respiratory An  Preliminary       Radiology Data:   Imaging Results (last 24 hours)     Procedure Component Value Units Date/Time    XR Chest 2 View [[id number]] Collected:  04/12/18 1458     Updated:  04/12/18 1505    Narrative:       Exam:   XR CHEST 2 VW-       Date:  4/12/2018      History:  Male, age  82 years;shortness of breath; E86.0-Dehydration;  I95.9-Hypotension, unspecified; R13.12-Dysphagia, oropharyngeal phase     COMPARISON:  Chest x-ray dated 4/11/2018     Findings :  Left-sided cardiac device in place. Median sternotomy wires appear  similar.  The heart and mediastinum are unchanged in size. Bilateral patchy  opacities, unchanged. No  pneumothorax.  The bones show no acute  pathology.         Impression:       Impression:     No significant interval change when compared to chest x-ray dated  4/11/2018.     This report was finalized on 04/12/2018 15:02 by Dr. Rachele Hadley MD.    FL Video Swallow With Speech [[id number]] Collected:  04/12/18 1431     Updated:  04/12/18 1435    Narrative:       EXAMINATION:   FL VIDEO SWALLOW W SPEECH-  4/12/2018 2:31 PM CDT     HISTORY: Patient swallowed boluses of various viscosities.     On this examination honey thick and pudding thick liquids were swallowed  without difficulty. Nectar thick liquid demonstrated penetration. Thin  barium demonstrated aspiration.     This procedure utilized 1.7 minutes of fluoroscopic time.     IMPRESSION aspiration was observed with thin barium.  2. See speech pathologist report.  This report was finalized on 04/12/2018 14:32 by Dr. James Gilbert MD.          I have reviewed the patient current medications.     Assessment/Plan     Hospital Problem List     * (Principal)Healthcare-associated pneumonia    Dehydration    Chronic systolic congestive heart failure    Shortness of breath    V-tach    Other hyperlipidemia    Type 2 diabetes mellitus    Dementia               1.  HCAP  -Vanc/Zosyn  -Blood/sputum cultures  -Nebs     2.  Chronic Systolic CHF  -Coreg     3.  V-Tach  -Amiodarone  -Coreg     4.  HLD  -Lipitor     5.  Insulin Dependent T2DM  -SSI     6.  Dementia with behavioral distubrance  -Seroquel QHS       7.  Dehydration  -IVF     8.  HLD  -Lipitor     9.  CAD  -Lipitor  -Coreg  -Asa  -Plavix     10.  CVD   -Lipitor  -Asa  -Plavix     11.  Essential HTN  -Coreg        12.  Chronic Anemia  -monitor H&H     13.  GERD  -Protonix            Discharge Planning: I expect the patient to be discharged to home in 2-3 days  Ishaan Gong MD   04/13/18   10:35 AM

## 2018-04-13 NOTE — PLAN OF CARE
Problem: Patient Care Overview  Goal: Plan of Care Review  Outcome: Ongoing (interventions implemented as appropriate)   04/13/18 3476   Coping/Psychosocial   Plan of Care Reviewed With patient   Plan of Care Review   Progress declining   OTHER   Outcome Summary OT eval completed. pt was mod x2 for bed mobility. Pt was mod x2 for sit to stand with RW. Pt had significant posterior and R lean. Pt was max A to nancy socks. pt would benefit from skilled Ot to address adls, functional mobility, safety, d/c planning and cognition. Pt is a high fall risk and not safe to d/c home at this time. Recommended d/c SNF.

## 2018-04-13 NOTE — PROGRESS NOTES
Continued Stay Note   [facility]     Patient Name: Delon Cid  MRN: [id number]  Today's Date: 4/13/2018    Admit Date: 4/11/2018          Discharge Plan     Row Name 04/13/18 1443       Plan    Plan PT, spouse and family contniue to refuse SNF placement. PT plans to dc home with spouse and wants to resume HH previously ordered by Dr. Moscoso upon dc from SNF last week. PT and spouse do not know which agency they are using. SW will call Dr. Moscoso's office to find out which HH is involved ([facility]). PT's family is asking about PT qualifying for a hospital bed upon dc. If orders are written, SW will arrange.     Patient/Family in Agreement with Plan yes              Discharge Codes    No documentation.           NEELA Escalante

## 2018-04-13 NOTE — THERAPY TREATMENT NOTE
Acute Care - Speech Language Pathology   Swallow Treatment Note [facility]     Patient Name: Delon Cid  : 1935  MRN: [id number]  Today's Date: 2018               Admit Date: 2018  SLP treatment completed. Pt was observed with mechanical soft breakfast and honey thick liquids. SLP notes that prior to breakfast as well as during meal that the pt had inconsistent coughing. The pt and pt's wife were educated on the importance of only completing small bites while in an upright position and utiizing a honey thick liquid wash post swallow of solid consistency in order to clear oral and pharyngeal residue. The pt did require moderate verbal cues per SLP as well as pt's wife to complete smaller bite sizes as well as utilizing a liquid wash throughout session. SLP cannot fully r/o aspiration of PO consistencies. SLP recommends pt continue a mechanical soft diet with honey thick liquids. RN to monitor for increased congestion. SLP will continue to follow and treat.  Yeison Jennings MS CCC-SLP 2018 11:29 AM    Visit Dx:      ICD-10-CM ICD-9-CM   1. Dehydration E86.0 276.51   2. Hypotension, unspecified hypotension type I95.9 458.9   3. Oropharyngeal dysphagia R13.12 787.22     Patient Active Problem List   Diagnosis   • Dehydration   • Healthcare-associated pneumonia   • Chronic systolic congestive heart failure   • Shortness of breath   • V-tach   • Other hyperlipidemia   • Type 2 diabetes mellitus   • Dementia       Therapy Treatment    Therapy Treatment / Health Promotion    Treatment Time/Intention  Discipline: speech language pathologist (18 : Yeison Jennings MS CCC-SLP)  Document Type: therapy note (daily note) (18 : Yeison Jennings MS CCC-SLP)  Subjective Information: no complaints (18 : Yeison Jennings MS CCC-SLP)  Patient/Family Observations: Wife in room (18 : Yeison Jennings MS CCC-SLP)  Care Plan Review: care plan/treatment goals reviewed (18  0823 : Yeison Jennings MS CCC-SLP)  Patient Effort: adequate (04/13/18 0823 : Yeison Jennings MS CCC-SLP)  Plan of Care Review  Plan of Care Reviewed With: patient (04/13/18 1126 : Yeison Jennings MS CCC-SLP)    Vitals/Pain/Safety  Pain Assessment  Additional Documentation: Pain Scale: Numbers Pre/Post-Treatment (Group) (04/13/18 0823 : Yeison Jennings MS CCC-SLP)  Pain Scale: Numbers Pre/Post-Treatment  Pain Scale: Numbers, Pretreatment: 0/10 - no pain (04/13/18 0823 : Yeison Jennings MS CCC-SLP)    Cognition, Communication, Swallow  Recommendations  Anticipated Dischage Disposition: skilled nursing facility (04/13/18 0823 : Yeison Jennings MS CCC-SLP)    Outcome Summary  Outcome Summary/Treatment Plan (SLP)  Daily Summary of Progress (SLP): progress toward functional goals is gradual (04/13/18 0823 : Yeison Jennings MS CCC-SLP)  Plan for Continued Treatment (SLP): SLP will continue to follo and treat. (04/13/18 0823 : Yeison Jennings MS CCC-SLP)  Anticipated Dischage Disposition: skilled nursing facility (04/13/18 0823 : Yeison Jennings MS CCC-SLP)            SLP GOALS     Row Name 04/13/18 0823 04/12/18 1430 04/12/18 0945       Oral Nutrition/Hydration Goal 1 (SLP)    Oral Nutrition/Hydration Goal 1, SLP Pt will tolerate trials of recommended anad upgraded diet consistencies without overt s/s of aspiration.   -CS Pt will tolerate trials of recommended anad upgraded diet consistencies without overt s/s of aspiration.   -TM (P)  LTG: Pt will tolerate complete trials of LRD without any s/s of aspiration.   -DS    Time Frame (Oral Nutrition/Hydration Goal 1, SLP) by discharge  -CS by discharge  -TM (P)  by discharge  -DS    Barriers (Oral Nutrition/Hydration Goal 1, SLP) cognition  -CS cognition  -TM (P)  cognition  -DS    Progress/Outcomes (Oral Nutrition/Hydration Goal 1, SLP) goal ongoing  -CS goal ongoing  -TM (P)  goal ongoing  -DS       Labial Strengthening Goal 1 (SLP)    Activity (Labial Strengthening Goal 1, SLP) increase  labial tone  -CS increase labial tone  -TM  --    Increase Labial Tone labial resistance exercises  -CS labial resistance exercises  -TM  --    [place]/Accuracy (Labial Strengthening Goal 1, SLP) with minimal cues (75-90% accuracy)  -CS with minimal cues (75-90% accuracy)  -TM  --    Time Frame (Labial Strengthening Goal 1, SLP) by discharge  -CS by discharge  -TM  --    Barriers (Labial Strengthening Goal 1, SLP) Cognition  -CS Cognition  -TM  --    Progress/Outcomes (Labial Strengthening Goal 1, SLP) goal ongoing  -CS goal ongoing  -TM  --       Lingual Strengthening Goal 1 (SLP)    Activity (Lingual Strengthening Goal 1, SLP) increase lingual tone/sensation/control/coordination/movement;increase tongue back strength  -CS increase lingual tone/sensation/control/coordination/movement;increase tongue back strength  -TM  --    Increase Lingual Tone/Sensation/Control/Coordination/Movement lingual resistance exercises;lingual movement exercises  -CS lingual resistance exercises;lingual movement exercises  -TM  --    Increase Tongue Back Strength lingual movement exercises  -CS lingual movement exercises  -TM  --    [place]/Accuracy (Lingual Strengthening Goal 1, SLP) with minimal cues (75-90% accuracy)  -CS with minimal cues (75-90% accuracy)  -TM  --    Time Frame (Lingual Strengthening Goal 1, SLP) by discharge  -CS by discharge  -TM  --    Barriers (Lingual Strengthening Goal 1, SLP) Cognition  -CS Cognition  -TM  --    Progress/Outcomes (Lingual Strengthening Goal 1, SLP) goal ongoing  -CS goal ongoing  -TM  --       Pharyngeal Strengthening Exercise Goal 1 (SLP)    Activity (Pharyngeal Strengthening Goal 1, SLP) increase timing;increase closure at entrance to airway/closure of airway at glottis;increase squeeze/positive pressure generation;increase tongue base retraction  -CS increase timing;increase closure at entrance to airway/closure of airway at glottis;increase squeeze/positive pressure  generation;increase tongue base retraction  -TM  --    Increase Timing gustatory stimulation (sour/cold)  -CS gustatory stimulation (sour/cold)  -TM  --    Increase Closure at Entrance to Airway/Closure of Airway at Glottis  -- --   Repeat vowel  -TM  --    Increase Squeeze/Positive Pressure Generation hard effortful swallow  -CS hard effortful swallow  -TM  --    Increase Tongue Base Retraction derrek  -CS derrek  -TM  --    [place]/Accuracy (Pharyngeal Strengthening Goal 1, SLP) with minimal cues (75-90% accuracy)  -CS with minimal cues (75-90% accuracy)  -TM  --    Time Frame (Pharyngeal Strengthening Goal 1, SLP) by discharge  -CS by discharge  -TM  --    Barriers (Pharyngeal Strengthening Goal 1, SLP) Cognition  -CS Cognition  -TM  --    Progress/Outcomes (Pharyngeal Strengthening Goal 1, SLP) goal ongoing  -CS goal ongoing  -TM  --      User Key  (r) = Recorded By, (t) = Taken By, (c) = Cosigned By    Initials Name Provider Type    TM Beulah Fraga CCC-SLP Speech and Language Pathologist    LAURA Jennings MS CCC-SLP Speech and Language Pathologist    ADRIANA Levy, Speech Therapy Student Speech Therapy Student          EDUCATION  The patient has been educated in the following areas:   Dysphagia (Swallowing Impairment).    SLP Recommendation and Plan                       Anticipated Dischage Disposition: skilled nursing facility                Plan of Care Reviewed With: patient  Plan of Care Review  Plan of Care Reviewed With: patient  Daily Summary of Progress (SLP): progress toward functional goals is gradual  Plan for Continued Treatment (SLP): SLP will continue to follo and treat.  Progress: no change  Outcome Summary: SLP treatment completed. Pt was observed with mechanical soft breakfast and honey thick liquids. SLP notes that prior to breakfast as well as during meal that the pt had inconsistent coughing. The pt and pt's wife were educated on the importance of only completing small bites  while in an upright position and utiizing a honey thick liquid wash post swallow of solid consistency in order to clear oral and pharyngeal residue. The pt did require moderate verbal cues per SLP as well as pt's wife to complete smaller bite sizes as well as utilizing a liquid wash throughout session. SLP cannot fully r/o aspiration of PO consistencies. SLP recommends pt continue a mechanical soft diet with honey thick liquids. RN to monitor for increased congestion. SLP will continue to follow and treat       SLP Outcome Measures (last 72 hours)      SLP Outcome Measures     Row Name 04/12/18 1430 04/12/18 1300          SLP Outcome Measures    Outcome Measure Used? Adult NOMS  -TM (P)  Adult NOMS  -DS        FCM Scores    FCM Chosen Swallowing  -TM (P)  Swallowing  -DS     Swallowing FCM Score 4  -TM (P)  4  -DS       User Key  (r) = Recorded By, (t) = Taken By, (c) = Cosigned By    Initials Name Effective Dates    TM Beulah Fraga CCC-SLP 08/02/16 -     ADRIANA Levy, Speech Therapy Student 03/02/18 -              Time Calculation:         Time Calculation- SLP     Row Name 04/13/18 1127             Time Calculation- SLP    SLP Start Time 0823  -      SLP Stop Time 0905  -      SLP Time Calculation (min) 42 min  -      SLP Received On 04/13/18  -        User Key  (r) = Recorded By, (t) = Taken By, (c) = Cosigned By    Initials Name Provider Type    CS Yeison Jennings MS CCC-SLP Speech and Language Pathologist          Therapy Charges for Today     Code Description Service Date Service Provider Modifiers Qty    [id number]  ST TREATMENT SWALLOW 3 4/13/2018 Yeison Jennings MS CCC-SLP GN 1          SLP G-Codes  SLP NOMS Used?: Yes  Functional Limitations: Swallowing  Swallow Current Status (): At least 40 percent but less than 60 percent impaired, limited or restricted  Swallow Goal Status (): At least 20 percent but less than 40 percent impaired, limited or restricted      Yeison Jennings MS  CCC-SLP  4/13/2018

## 2018-04-13 NOTE — PLAN OF CARE
Problem: Patient Care Overview  Goal: Plan of Care Review  Outcome: Ongoing (interventions implemented as appropriate)   04/13/18 1126   Coping/Psychosocial   Plan of Care Reviewed With patient   Plan of Care Review   Progress no change   OTHER   Outcome Summary SLP treatment completed. Pt was observed with mechanical soft breakfast and honey thick liquids. SLP notes that prior to breakfast as well as during meal that the pt had inconsistent coughing. The pt and pt's wife were educated on the importance of only completing small bites while in an upright position and utiizing a honey thick liquid wash post swallow of solid consistency in order to clear oral and pharyngeal residue. The pt did require moderate verbal cues per SLP as well as pt's wife to complete smaller bite sizes as well as utilizing a liquid wash throughout session. SLP cannot fully r/o aspiration of PO consistencies. SLP recommends pt continue a mechanical soft diet with honey thick liquids. RN to monitor for increased congestion. SLP will continue to follow and treat

## 2018-04-13 NOTE — DISCHARGE PLACEMENT REQUEST
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   Skin: Skin is warm and dry. No rash noted. No erythema. No pallor.   Psychiatric: He has a normal mood and affect. His behavior is normal. Cognition and memory are impaired.   Vitals reviewed.        Results Review:  I have reviewed the labs, radiology results, and diagnostic studies.    Laboratory Data:     Results from last 7 days  Lab Units 04/12/18  0409 04/11/18  1239   WBC 10*3/mm3 7.03 10.76   HEMOGLOBIN g/dL 10.0* 10.6*   HEMATOCRIT % 31.6* 33.0*   PLATELETS 10*3/mm3 179 210          Results from last 7 days  Lab Units 04/12/18  0409 04/11/18  1330   SODIUM mmol/L 136 135   POTASSIUM mmol/L 4.1 4.3   CHLORIDE mmol/L 100 98   CO2 mmol/L 26.0 31.0   BUN mg/dL 26* 27*   CREATININE mg/dL 1.09 1.15   CALCIUM mg/dL 9.1 9.2   BILIRUBIN mg/dL  --  0.7   ALK PHOS U/L  --  70   ALT (SGPT) U/L  --  17   AST (SGOT) U/L  --  25   GLUCOSE mg/dL 139* 113*       Culture Data:   Blood Culture   Date Value Ref Range Status   04/11/2018 No growth at less than 24 hours   Preliminary   04/11/2018 No growth at less than 24 hours  Preliminary       Radiology Data:   Imaging Results (last 24 hours)     Procedure Component Value Units Date/Time    XR Foot 3+ View Left [[id number]] Collected:  04/11/18 1347     Updated:  04/11/18 1351    Narrative:       EXAMINATION: XR FOOT 3+ VW LEFT-     4/11/2018 1:07 PM CDT     HISTORY: Traumatic injury. Foot pain.     Left foot, 3 views.     Osteopenia.     Intact metatarsals and toes.     Normal talus and calcaneus.     Summary:  1. No acute fracture.  This report was finalized on 04/11/2018 13:48 by Dr. Tommy Rudolph MD.    XR Chest 1 View [[id number]] Collected:  04/11/18 1346     Updated:  04/11/18 1350    Narrative:       EXAMINATION: XR CHEST 1 VW-     4/11/2018 1:07 PM CDT     HISTORY: weak, hypotensive.     One view chest x-ray compared with 03/09/2018.     Magnified heart size.  CABG changes with aortic arch calcification.  Left cardiac pacer is unchanged.     The lungs show interstitial prominence with mild patchy lower lobe  infiltrate or atelectasis.     There is no pneumothorax.     Summary:  1. Patchy bibasilar infiltrate or atelectasis.  This report was finalized on 04/11/2018 13:47 by Dr. Tommy Rudolph MD.    CT Lumbar Spine Without Contrast [[id number]] Collected:  04/11/18 1331     Updated:  04/11/18 1338    Narrative:       EXAMINATION: CT LUMBAR SPINE WITHOUT IV CONTRAST 4/11/2018     COMPARISON: None.     HISTORY: Male, 82 years-old. fall, recent kyphoplasty     TECHNIQUE: Multiple CT images were obtained of the lumbar spine without  IV contrast. The images were formatted in the axial, coronal and  sagittal planes.     Radiation dose equals  mGy-cm.  Automated exposure control dose  reduction technique was implemented.     FINDINGS:   There is grade 1 anterolisthesis of L3 on L4. Mild straightening of the  normal lordosis of the lumbar spine previous L1 vertebral body  augmentation and 2 column fracture. No additional  compression deformity  identified. The prevertebral and posterior paraspinal soft tissues are  unremarkable. There is multilevel degenerative changes, differences and  facet arthropathy. Moderate to severe spinal canal stenosis suspected at  L3-L4.     Vascular calcifications.    Impression:       1.  No acute osseous posttraumatic changes  2.  Additional chronic findings as described above including moderate to  severe spinal canal stenosis suspected at L3-L4.        This report was finalized on 04/11/2018 13:35 by Dr. Rachele Hadley MD.    CT Abdomen Pelvis Stone Protocol [[id number]] Collected:  04/11/18 1331     Updated:  04/11/18 1337    Narrative:       EXAMINATION: CT ABDOMEN PELVIS STONE PROTOCOL-      4/11/2018 12:55 PM CDT     HISTORY: fall, flank pain     In order to have a CT radiation dose as low as reasonably achievable  Automated Exposure Control was utilized for adjustment of the mA and/or  KV according to patient size.     DLP in mGycm= 305.     Axial, sagittal, and coronal noncontrast CT imaging of the  abdomen/pelvis.     Cardiomegaly. Cardiac pacer leads.     Bibasilar infiltrate or atelectasis with trace amounts of pleural fluid.     Normal noncontrast appearance of the liver and gallbladder.  Normal appearance of the pancreas and spleen.  Normal and symmetric adrenal glands and kidneys.  No renal stone or hydronephrosis.     No bowel dilation.  Aortic calcification with no aneurysm.  No pelvic mass or fluid.     L1 compression fracture with kyphoplasty change. No significant  paraspinal soft tissue edema at this level.     Summary:  1. No acute abnormality is seen.                             This report was finalized on 04/11/2018 13:34 by Dr. Tommy Rudolph MD.          I have reviewed the patient current medications.     Assessment/Plan     Hospital Problem List     * (Principal)Healthcare-associated pneumonia    Dehydration    Chronic systolic congestive heart failure    Shortness of breath     V-tach    Other hyperlipidemia    Type 2 diabetes mellitus    Dementia          1.  HCAP  -Vanc/Zosyn  -Blood/sputum cultures  -Nebs    2.  Chronic Systolic CHF  -Coreg    3.  V-Tach  -Amiodarone  -Coreg    4.  HLD  -Lipitor    5.  Insulin Dependent T2DM  -SSI    6.  Dementia    7.  Dehydration  -IVF    8.  HLD  -Lipitor    9.  CAD  -Lipitor  -Coreg  -Asa  -Plavix    10.  CVD   -Lipitor  -Asa  -Plavix    11.  Essential HTN  -Coreg      12.  Chronic Anemia  -monitor H&H    13.  GERD  -Protonix          Discharge Planning: I expect the patient to be discharged to home in 1-2 days.    Ishaan Gong MD   04/12/18   12:38 PM      Electronically signed by Ishaan Gong MD at 4/12/2018 12:47 PM          Consult Notes (last 72 hours) (Notes from 4/10/2018  3:49 PM through 4/13/2018  3:49 PM)      ROCIO Reyes at 4/12/2018  9:47 AM      Consult Orders:    1. Inpatient Cardiology Consult [[id number]] ordered by ROCIO Hatch at 04/11/18 1651           Attestation signed by Fito Chin MD at 4/12/2018  3:51 PM    I have personally reviewed EKG and telemetry which reveals SR with V pacing    Heart- RRR  Lungs- Clear  Ext-No edema    New problems that need Evaluation:  Probable mild dehydration, agree with fluid replacement. Will review echo      Medical Decision Making:  Moderate  Complexity    I have seen and examined the patient and agree with the findings below                      [facility] CONSULT NOTE    Referring Provider: No ref. provider found    Reason for Consultation: Congestive Heart Failure, Hypotension    Chief complaint:   Chief Complaint   Patient presents with   • Urinary Retention       Subjective .     History of present illness:  Delon Cid is a 82 y.o. male with history of ischemic cardiomyopathy, systolic congestive heart failure, recurrent Ventricular Tachycardia, AICD, CVA with subdural hematoma, Hyperlipidemia, Hypertension, Diabetes and  dementia. Patient recently has been having issues with falls and hypotension. He had a fall requiring kyphoplasty after sustaining a fracture s/p fall. Patient's wife states he went to rehab at [facility] but he didn't receive PT and so his wife decided to pull him out and he has been home. Patient was recently started on Midodrine due to hypotension. Patient's wife reports he was put on it by his cardiology office in [place] at a tapering dose. Patient denies chest pain. Patient complains of shortness of breath, cough, fatigue and confusion. Patient complains of orthopnea. Patient is currently being treated for pneumonia per primary team. Patient was found to be hypotensive yesterday. Patient was given a fluid bolus and coreg held. This am blood pressure is high. History of ischemic cardiomyopathy with EF as low as 25%.     History  Past Medical History:   Diagnosis Date   • CAD (coronary artery disease)    • Cardiomyopathy    • Chronic systolic CHF (congestive heart failure)    • Dementia    • Hypertension    • Insulin dependent diabetes mellitus    • Myocardial infarction    • Stroke    • Sustained VT (ventricular tachycardia)    ,   Past Surgical History:   Procedure Laterality Date   • BACK SURGERY     • CARDIAC DEFIBRILLATOR PLACEMENT     • CAROTID ENDARTERECTOMY Right    • CATARACT EXTRACTION, BILATERAL     • CORONARY ARTERY BYPASS GRAFT     • KYPHOPLASTY     ,   Family History   Problem Relation Age of Onset   • Heart disease Father    ,   Social History   Substance Use Topics   • Smoking status: Never Smoker   • Smokeless tobacco: Not on file   • Alcohol use No   ,     Medications    Prior to Admission medications    Medication Sig Start Date End Date Taking? Authorizing Provider   amiodarone (PACERONE) 200 MG tablet Take 200 mg by mouth Daily.   Yes Historical Provider, MD   aspirin 81 MG EC tablet Take 1 tablet by mouth daily. 12/18/12  Yes Historical Provider, MD   carvedilol (COREG) 6.25 MG tablet  Take 1 tablet by mouth 2 times daily (with meals). 6/26/12  Yes Historical Provider, MD   docusate sodium (COLACE) 100 MG capsule Take 100 mg by mouth 3 (Three) Times a Day As Needed for Constipation.   Yes Historical Provider, MD   ferrous sulfate 325 (65 FE) MG tablet Take 325 mg by mouth Daily With Breakfast.   Yes Historical Provider, MD   folic acid (FOLVITE) 1 MG tablet Take 2 mg by mouth Daily.   Yes Historical Provider, MD   insulin NPH-insulin regular (humuLIN 70/30,novoLIN 70/30) (70-30) 100 UNIT/ML injection Inject 30-40 Units under the skin 2 (Two) Times a Day With Meals.   Yes Historical Provider, MD   levothyroxine (SYNTHROID, LEVOTHROID) 175 MCG tablet Take 175 mcg by mouth Daily.   Yes Historical Provider, MD   midodrine (PROAMATINE) 5 MG tablet Take 5 mg by mouth 3 (Three) Times a Day.   Yes Historical Provider, MD   nitroglycerin (NITROSTAT) 0.4 MG SL tablet Place 0.4 mg under the tongue Every 5 (Five) Minutes As Needed for Chest Pain. Take no more than 3 doses in 15 minutes.   Yes Historical Provider, MD   polyethylene glycol (MIRALAX) packet Take 17 g by mouth Daily As Needed (constipation). 10/26/17  Yes ROCIO Dailey   potassium chloride (K-DUR) 10 MEQ CR tablet Take 10 mEq by mouth 2 (Two) Times a Day.   Yes Historical Provider, MD   traMADol (ULTRAM) 50 MG tablet Take 50 mg by mouth Every 6 (Six) Hours As Needed for Moderate Pain .   Yes Historical Provider, MD   clopidogrel (PLAVIX) 75 MG tablet Take 75 mg by mouth Daily.    Historical Provider, MD       Current Facility-Administered Medications   Medication Dose Route Frequency Provider Last Rate Last Dose   • acetaminophen (TYLENOL) tablet 650 mg  650 mg Oral Q4H PRN ROCIO Hatch       • amiodarone (PACERONE) tablet 200 mg  200 mg Oral Daily ROCIO Hatch   200 mg at 04/12/18 0943   • aspirin EC tablet 81 mg  81 mg Oral Daily ROCIO Hatch   81 mg at 04/12/18 0943   • clopidogrel (PLAVIX) tablet 75 mg   75 mg Oral Daily Cari Ayoub APRN   75 mg at 04/12/18 0943   • dextrose (D50W) solution 25 g  25 g Intravenous Q15 Min PRN ROCIO Hatch       • dextrose (GLUTOSE) oral gel 15 g  15 g Oral Q15 Min PRN Cari Ayoub APRN       • docusate sodium (COLACE) capsule 100 mg  100 mg Oral TID PRN Cari Ayoub APRN       • enoxaparin (LOVENOX) syringe 40 mg  40 mg Subcutaneous Q24H Cari Ayoub APRN   40 mg at 04/11/18 1834   • ferrous sulfate tablet 325 mg  325 mg Oral Daily With Breakfast Cari Ayoub APRN   325 mg at 04/12/18 0943   • folic acid (FOLVITE) tablet 2 mg  2 mg Oral Daily Cari Ayoub APRN   2 mg at 04/12/18 0943   • glucagon (human recombinant) (GLUCAGEN DIAGNOSTIC) injection 1 mg  1 mg Subcutaneous PRN Cari Ayoub APRTAMIA       • HYDROcodone-acetaminophen (NORCO) 5-325 MG per tablet 1 tablet  1 tablet Oral Q4H PRN Cari Ayoub APRN   1 tablet at 04/11/18 2111   • insulin lispro (humaLOG) injection 0-9 Units  0-9 Units Subcutaneous 4x Daily With Meals & Nightly ROCIO Hatch   2 Units at 04/12/18 0942   • ipratropium-albuterol (DUO-NEB) nebulizer solution 3 mL  3 mL Nebulization Q6H While Awake - RT ROCIO Hatch   3 mL at 04/12/18 0628   • midodrine (PROAMATINE) tablet 5 mg  5 mg Oral TID Cari Ayoub APRN   5 mg at 04/12/18 0942   • ondansetron (ZOFRAN) tablet 4 mg  4 mg Oral Q6H PRN ROCIO Hatch        Or   • ondansetron ODT (ZOFRAN-ODT) disintegrating tablet 4 mg  4 mg Oral Q6H PRN ROCIO Hatch        Or   • ondansetron (ZOFRAN) injection 4 mg  4 mg Intravenous Q6H PRN Cari Ayoub APRTAMIA       • pantoprazole (PROTONIX) EC tablet 40 mg  40 mg Oral QAM Cari Ayoub APRN   40 mg at 04/12/18 0942   • piperacillin-tazobactam (ZOSYN) 4.5 g in iso-osmotic dextrose 100 mL IVPB (premix)  4.5 g Intravenous Q8H ROCIO Hatch   4.5 g at 04/12/18 0943   • sodium chloride 0.9 % flush 1-10 mL  1-10 mL Intravenous  PRN ROCIO Hatch       • sodium chloride 0.9 % flush 10 mL  10 mL Intravenous PRN Sonia Wong MD       • traMADol (ULTRAM) tablet 50 mg  50 mg Oral Q6H PRN ROCIO Hatch       • vancomycin 1250 mg/250 mL 0.9% NS IVPB ([facility])  1,250 mg Intravenous Q24H ROCIO Hatch   Stopped at 04/11/18 5175       Allergies:  Review of patient's allergies indicates no known allergies.    Review of Systems  Review of Systems   Constitution: Positive for weakness and malaise/fatigue. Negative for chills, decreased appetite, fever, weight gain and weight loss.   HENT: Negative for nosebleeds.    Eyes: Negative for visual disturbance.   Cardiovascular: Positive for orthopnea. Negative for chest pain, dyspnea on exertion, leg swelling, near-syncope, palpitations, paroxysmal nocturnal dyspnea and syncope.   Respiratory: Positive for cough and shortness of breath. Negative for hemoptysis and snoring.    Endocrine: Negative for cold intolerance and heat intolerance.   Hematologic/Lymphatic: Negative for bleeding problem. Does not bruise/bleed easily.   Skin: Negative for rash.   Musculoskeletal: Positive for falls. Negative for back pain.   Gastrointestinal: Negative for abdominal pain, constipation, diarrhea, heartburn, melena, nausea and vomiting.   Genitourinary: Negative for hematuria.   Neurological: Negative for dizziness, headaches and light-headedness.   Psychiatric/Behavioral: Negative for altered mental status.   Allergic/Immunologic: Negative for persistent infections.       Objective     Physical Exam:  Patient Vitals for the past 24 hrs:   BP Temp Temp src Pulse Resp SpO2 Height Weight   04/12/18 0737 150/72 97.9 °F (36.6 °C) Oral 76 18 95 % - -   04/12/18 0633 - - - 74 18 - - -   04/12/18 0628 - - - 76 16 93 % - -   04/12/18 0401 143/59 97.6 °F (36.4 °C) Temporal Art 68 15 95 % - -   04/11/18 2358 132/54 98.2 °F (36.8 °C) Temporal Art 68 15 92 % - -   04/11/18 2203 - - - 75 18 96 % - -   04/11/18  "2153 - - - 73 18 93 % - -   04/11/18 2110 138/66 98.1 °F (36.7 °C) Temporal Art 74 20 97 % - -   04/11/18 1619 93/65 97.4 °F (36.3 °C) Temporal Art 73 18 99 % 180.3 cm (71\") 71.7 kg (158 lb)   04/11/18 1532 (!) 84/68 - - 68 16 93 % - -   04/11/18 1432 (!) 82/60 - - 67 15 94 % - -   04/11/18 1430 - - - 67 - 91 % - -   04/11/18 1312 (!) 76/62 - - 69 - - - -   04/11/18 1214 - - - 59 - - - -   04/11/18 1213 (!) 84/62 97.5 °F (36.4 °C) Temporal Art (!) 39 12 92 % 180.3 cm (71\") 81.6 kg (180 lb)     Physical Exam   Constitutional: He is oriented to person, place, and time. He appears well-developed and well-nourished.   HENT:   Head: Normocephalic and atraumatic.   Eyes: Pupils are equal, round, and reactive to light.   Neck: Normal range of motion. Neck supple. No JVD present. Carotid bruit is not present.   Cardiovascular: Normal rate, regular rhythm, normal heart sounds and intact distal pulses.    Pulmonary/Chest: Effort normal and breath sounds normal.   Coarse breath sounds   Abdominal: Soft. Bowel sounds are normal.   Musculoskeletal: Normal range of motion. Edema: trace lower leg edema.   Neurological: He is alert and oriented to person, place, and time. He has normal reflexes.   Skin: Skin is warm and dry.   Psychiatric: He has a normal mood and affect. His behavior is normal. Judgment and thought content normal.       Results Review:   I reviewed the patient's new clinical results.  Lab Results (last 72 hours)     Procedure Component Value Units Date/Time    POC Glucose Once [[id number]]  (Abnormal) Collected:  04/12/18 0738    Specimen:  Blood Updated:  04/12/18 0749     Glucose 167 (H) mg/dL      Comment: : [id number] Nico First[employer]ieee[employer] ID: [id number]       Respiratory Culture - Sputum, Cough [[id number]] Collected:  04/12/18 0639    Specimen:  Sputum from Cough Updated:  04/12/18 0645    Basic Metabolic Panel [[id number]]  (Abnormal) Collected:  04/12/18 0409    Specimen:  Blood Updated:  04/12/18 0509    "  Glucose 139 (H) mg/dL      BUN 26 (H) mg/dL      Creatinine 1.09 mg/dL      Sodium 136 mmol/L      Potassium 4.1 mmol/L      Chloride 100 mmol/L      CO2 26.0 mmol/L      Calcium 9.1 mg/dL      eGFR Non African Amer 65 mL/min/1.73      BUN/Creatinine Ratio 23.9     Anion Gap 10.0 mmol/L     Narrative:       The MDRD GFR formula is only valid for adults with stable renal function between ages 18 and 70.    CBC (No Diff) [[id number]]  (Abnormal) Collected:  04/12/18 0409    Specimen:  Blood Updated:  04/12/18 0503     WBC 7.03 10*3/mm3      RBC 3.39 (L) 10*6/mm3      Hemoglobin 10.0 (L) g/dL      Hematocrit 31.6 (L) %      MCV 93.2 fL      MCH 29.5 pg      MCHC 31.6 (L) g/dL      RDW 14.0 %      RDW-SD 47.0 fl      MPV 10.8 fL      Platelets 179 10*3/mm3     Blood Culture - Blood, Blood, Venous Line [[id number]]  (Normal) Collected:  04/11/18 1325    Specimen:  Blood from Arm, Left Updated:  04/12/18 0206     Blood Culture No growth at less than 24 hours    Blood Culture - Blood, Blood, Venous Line [[id number]]  (Normal) Collected:  04/11/18 1329    Specimen:  Blood from Arm, Left Updated:  04/12/18 0206     Blood Culture No growth at less than 24 hours    POC Glucose Once [[id number]]  (Abnormal) Collected:  04/11/18 2057    Specimen:  Blood Updated:  04/11/18 2109     Glucose 235 (H) mg/dL      Comment: : [id number] [employer] Susan[employer]eter ID: [id number]       Legionella Antigen, Urine - Urine, Urine, Clean Catch [[id number]]  (Normal) Collected:  04/11/18 1401    Specimen:  Urine from Urine, Clean Catch Updated:  04/11/18 1809     LEGIONELLA ANTIGEN, URINE Negative    S. Pneumo Ag Urine or CSF - Urine, Urine, Clean Catch [[id number]]  (Normal) Collected:  04/11/18 1401    Specimen:  Urine from Urine, Clean Catch Updated:  04/11/18 1809     Strep Pneumo Ag Negative    Hemoglobin A1c [[id number]] Collected:  04/11/18 1239    Specimen:  Blood Updated:  04/11/18 1737     Hemoglobin A1C 12.2 %     Narrative:       Less than  6.0           Non-Diabetic Range  6.0-7.0                 ADA Therapeutic Target  Greater than 7.0        Action Suggested    Urinalysis With / Culture If Indicated - Urine, Clean Catch [[id number]]  (Abnormal) Collected:  04/11/18 1401    Specimen:  Urine from Urine, Clean Catch Updated:  04/11/18 1413     Color, UA Dark Yellow (A)     Appearance, UA Clear     pH, UA <=5.0     Specific Gravity, UA >1.030 (H)     Glucose,  mg/dL (1+) (A)     Ketones, UA Trace (A)     Bilirubin, UA Small (1+) (A)     Blood, UA Negative     Protein, UA Trace (A)     Leuk Esterase, UA Negative     Nitrite, UA Negative     Urobilinogen, UA 1.0 E.U./dL    Narrative:       Urine microscopic not indicated.    Comprehensive Metabolic Panel [[id number]]  (Abnormal) Collected:  04/11/18 1330    Specimen:  Blood Updated:  04/11/18 1351     Glucose 113 (H) mg/dL      BUN 27 (H) mg/dL      Creatinine 1.15 mg/dL      Sodium 135 mmol/L      Potassium 4.3 mmol/L      Chloride 98 mmol/L      CO2 31.0 mmol/L      Calcium 9.2 mg/dL      Total Protein 6.2 (L) g/dL      Albumin 3.30 (L) g/dL      ALT (SGPT) 17 U/L      AST (SGOT) 25 U/L      Alkaline Phosphatase 70 U/L      Total Bilirubin 0.7 mg/dL      eGFR Non African Amer 61 mL/min/1.73      Globulin 2.9 gm/dL      A/G Ratio 1.1 g/dL      BUN/Creatinine Ratio 23.5     Anion Gap 6.0 mmol/L     Narrative:       The MDRD GFR formula is only valid for adults with stable renal function between ages 18 and 70.    CBC & Differential [[id number]] Collected:  04/11/18 1239    Specimen:  Blood Updated:  04/11/18 1303    Narrative:       The following orders were created for panel order CBC & Differential.  Procedure                               Abnormality         Status                     ---------                               -----------         ------                     CBC Auto Differential[[id number]]        Abnormal            Final result                 Please view results for these tests on the  individual orders.    CBC Auto Differential [[id number]]  (Abnormal) Collected:  04/11/18 1239    Specimen:  Blood Updated:  04/11/18 1303     WBC 10.76 10*3/mm3      RBC 3.56 (L) 10*6/mm3      Hemoglobin 10.6 (L) g/dL      Hematocrit 33.0 (L) %      MCV 92.7 fL      MCH 29.8 pg      MCHC 32.1 (L) g/dL      RDW 13.9 %      RDW-SD 46.9 fl      MPV 10.6 fL      Platelets 210 10*3/mm3      Neutrophil % 81.1 (H) %      Lymphocyte % 10.3 (L) %      Monocyte % 6.6 %      Eosinophil % 0.7 %      Basophil % 0.5 %      Immature Grans % 0.8 %      Neutrophils, Absolute 8.73 (H) 10*3/mm3      Lymphocytes, Absolute 1.11 10*3/mm3      Monocytes, Absolute 0.71 10*3/mm3      Eosinophils, Absolute 0.07 10*3/mm3      Basophils, Absolute 0.05 10*3/mm3      Immature Grans, Absolute 0.09 (H) 10*3/mm3      nRBC 0.0 /100 WBC     Lactic Acid, Plasma [[id number]]  (Normal) Collected:  04/11/18 1239    Specimen:  Blood Updated:  04/11/18 1300     Lactate 1.4 mmol/L     POC Glucose Once [[id number]]  (Normal) Collected:  04/11/18 1233    Specimen:  Blood Updated:  04/11/18 1246     Glucose 105 mg/dL      Comment: : [id number] Mcwilliams [facility] ID: [id number]             No results found for: ECHOEFEST    Imaging Results (last 72 hours)     Procedure Component Value Units Date/Time    XR Foot 3+ View Left [[id number]] Collected:  04/11/18 1347     Updated:  04/11/18 1351    Narrative:       EXAMINATION: XR FOOT 3+ VW LEFT-     4/11/2018 1:07 PM CDT     HISTORY: Traumatic injury. Foot pain.     Left foot, 3 views.     Osteopenia.     Intact metatarsals and toes.     Normal talus and calcaneus.     Summary:  1. No acute fracture.  This report was finalized on 04/11/2018 13:48 by Dr. Tommy Rudolph MD.    XR Chest 1 View [[id number]] Collected:  04/11/18 1346     Updated:  04/11/18 1350    Narrative:       EXAMINATION: XR CHEST 1 VW-     4/11/2018 1:07 PM CDT     HISTORY: weak, hypotensive.     One view chest x-ray compared with 03/09/2018.      Magnified heart size.  CABG changes with aortic arch calcification.  Left cardiac pacer is unchanged.     The lungs show interstitial prominence with mild patchy lower lobe  infiltrate or atelectasis.     There is no pneumothorax.     Summary:  1. Patchy bibasilar infiltrate or atelectasis.  This report was finalized on 04/11/2018 13:47 by Dr. Tommy Rudolph MD.    CT Lumbar Spine Without Contrast [[id number]] Collected:  04/11/18 1331     Updated:  04/11/18 1338    Narrative:       EXAMINATION: CT LUMBAR SPINE WITHOUT IV CONTRAST 4/11/2018     COMPARISON: None.     HISTORY: Male, 82 years-old. fall, recent kyphoplasty     TECHNIQUE: Multiple CT images were obtained of the lumbar spine without  IV contrast. The images were formatted in the axial, coronal and  sagittal planes.     Radiation dose equals  mGy-cm.  Automated exposure control dose  reduction technique was implemented.     FINDINGS:   There is grade 1 anterolisthesis of L3 on L4. Mild straightening of the  normal lordosis of the lumbar spine previous L1 vertebral body  augmentation and 2 column fracture. No additional compression deformity  identified. The prevertebral and posterior paraspinal soft tissues are  unremarkable. There is multilevel degenerative changes, differences and  facet arthropathy. Moderate to severe spinal canal stenosis suspected at  L3-L4.     Vascular calcifications.    Impression:       1.  No acute osseous posttraumatic changes  2.  Additional chronic findings as described above including moderate to  severe spinal canal stenosis suspected at L3-L4.        This report was finalized on 04/11/2018 13:35 by Dr. Rachele Hadley MD.    CT Abdomen Pelvis Stone Protocol [[id number]] Collected:  04/11/18 1331     Updated:  04/11/18 1337    Narrative:       EXAMINATION: CT ABDOMEN PELVIS STONE PROTOCOL-      4/11/2018 12:55 PM CDT     HISTORY: fall, flank pain     In order to have a CT radiation dose as low as reasonably  achievable  Automated Exposure Control was utilized for adjustment of the mA and/or  KV according to patient size.     DLP in mGycm= 305.     Axial, sagittal, and coronal noncontrast CT imaging of the  abdomen/pelvis.     Cardiomegaly. Cardiac pacer leads.     Bibasilar infiltrate or atelectasis with trace amounts of pleural fluid.     Normal noncontrast appearance of the liver and gallbladder.  Normal appearance of the pancreas and spleen.  Normal and symmetric adrenal glands and kidneys.  No renal stone or hydronephrosis.     No bowel dilation.  Aortic calcification with no aneurysm.  No pelvic mass or fluid.     L1 compression fracture with kyphoplasty change. No significant  paraspinal soft tissue edema at this level.     Summary:  1. No acute abnormality is seen.     This report was finalized on 04/11/2018 13:34 by Dr. Tommy Rudolph MD.        Assessment/Plan     Principal Problem:    Healthcare-associated pneumonia  Active Problems:    Dehydration    Chronic systolic congestive heart failure    Shortness of breath    V-tach    Other hyperlipidemia    Type 2 diabetes mellitus    Dementia    Plan:  We were consulted to assist with hypotension. Patient was given an IV bolus yesterday and coreg was held. Blood Pressure has recovered, in fact is high this morning. Would recommend resuming coreg and close monitoring of blood pressure. There is some confusion on Midodrine order per his wife- she reports he is taking a twice a day and next week he is to go down to once a day. Will ask nurse to contact.   Patient is followed by Dr. Miller at [facility] in [place]  Antibiotics per primary team  Will order 2 View chest x-ray today- also mentioned in primary team's note yesterday  Echo is pending- previous EF 25%- most recent was 45%  Further orders per Dr. Chin  Would not recommend holding amiodarone as has little blood pressure effect. In the past with holding amiodarone patient had recurrent V-Tach  Low Salt Diet,  Daily Weights, Strict I&O    Thank you for asking us to follow this patient with you.     Layo Amin, ROCIO  04/12/18  10:06 AM        Electronically signed by Fito Chin MD at 4/12/2018  3:51 PM

## 2018-04-13 NOTE — PLAN OF CARE
Problem: Patient Care Overview  Goal: Plan of Care Review   04/13/18 3791   Coping/Psychosocial   Plan of Care Reviewed With patient;spouse;daughter   Plan of Care Review   Progress declining   OTHER   Outcome Summary pt w/dementia et wife forgetful pt w/poor mentation very weak no p.t. fu await eval o.t. et s.t. working w/ pt pt cont coughing meds w/ applesauce cont iv antibiotics wife gives pt thin liq even after told only thick liq packets at bs pt pees anywhere taking penis out of diaper el meds denies pain await d/c planning cont labs vanco trough 4/14     Goal: Individualization and Mutuality  Outcome: Ongoing (interventions implemented as appropriate)    Goal: Discharge Needs Assessment  Outcome: Ongoing (interventions implemented as appropriate)    Goal: Interprofessional Rounds/Family Conf  Outcome: Ongoing (interventions implemented as appropriate)      Problem: Fluid Volume Deficit (Adult)  Goal: Optimal Fluid Balance  Outcome: Ongoing (interventions implemented as appropriate)      Problem: Fall Risk (Adult)  Goal: Absence of Fall  Outcome: Ongoing (interventions implemented as appropriate)      Problem: Wound (Includes Pressure Injury) (Adult)  Goal: Signs and Symptoms of Listed Potential Problems Will be Absent, Minimized or Managed (Wound)  Outcome: Ongoing (interventions implemented as appropriate)      Problem: Skin Injury Risk (Adult)  Goal: Identify Related Risk Factors and Signs and Symptoms  Outcome: Ongoing (interventions implemented as appropriate)    Goal: Skin Health and Integrity  Outcome: Ongoing (interventions implemented as appropriate)

## 2018-04-13 NOTE — THERAPY EVALUATION
Acute Care - Occupational Therapy Initial Evaluation  [facility]     Patient Name: Delon Cid  : 1935  MRN: [id number]  Today's Date: 2018  Onset of Illness/Injury or Date of Surgery: 18  Date of Referral to OT: 18  Referring Physician: ROCIO Reynolds    Admit Date: 2018       ICD-10-CM ICD-9-CM   1. Dehydration E86.0 276.51   2. Hypotension, unspecified hypotension type I95.9 458.9   3. Oropharyngeal dysphagia R13.12 787.22   4. Impaired functional mobility, balance, gait, and endurance Z74.09 V49.89   5. Impaired mobility and ADLs Z74.09 799.89     Patient Active Problem List   Diagnosis   • Dehydration   • Healthcare-associated pneumonia   • Chronic systolic congestive heart failure   • Shortness of breath   • V-tach   • Other hyperlipidemia   • Type 2 diabetes mellitus   • Dementia     Past Medical History:   Diagnosis Date   • CAD (coronary artery disease)    • Cardiomyopathy    • Chronic systolic CHF (congestive heart failure)    • Dementia    • Hypertension    • Insulin dependent diabetes mellitus    • Myocardial infarction    • Stroke    • Sustained VT (ventricular tachycardia)      Past Surgical History:   Procedure Laterality Date   • BACK SURGERY     • CARDIAC DEFIBRILLATOR PLACEMENT     • CAROTID ENDARTERECTOMY Right    • CATARACT EXTRACTION, BILATERAL     • CORONARY ARTERY BYPASS GRAFT     • KYPHOPLASTY            OT ASSESSMENT FLOWSHEET (last 72 hours)      Occupational Therapy Evaluation     Row Name 18 1438                   OT Evaluation Time/Intention    Subjective Information complains of;pain  -MM        Document Type evaluation   see MAR  -MM        Mode of Treatment occupational therapy  -MM        Patient Effort adequate  -MM        Symptoms Noted During/After Treatment fatigue  -MM           General Information    Patient Profile Reviewed? yes  -MM        Onset of Illness/Injury or Date of Surgery 18  -MM        Referring Physician Cari  ROCIO Ayoub  -MM        Patient Observations alert;cooperative;agree to therapy  -MM        Patient/Family Observations spouse present  -MM        General Observations of Patient fowlers in bed  -MM        Prior Level of Function min assist:;bed mobility;gait;transfer  -MM        Equipment Currently Used at Home cane, quad;walker, rolling;shower chair;commode, bedside  -MM        Pertinent History of Current Functional Problem Pt recently seen at [facility] for spinal fx and kyphoplasty after fall. Pt was at SNF for 2 days afterward before leaving d/t desire to return home. Pt had multiple falls at home, spouse took him to PCP where low blood pressure was noted. Chest Xray 4/11 noted bilateral lower lobe infiltrates/pneumonia.  -MM        Existing Precautions/Restrictions fall  -MM        Limitations/Impairments safety/cognitive  -MM        Risks Reviewed patient:;spouse/S.O.:;LOB;nausea/vomiting;dizziness;increased discomfort;change in vital signs;increased drainage;lines disloged  -MM        Benefits Reviewed patient:;spouse/S.O.:;improve function;increase independence;increase strength;increase balance;decrease pain;decrease risk of DVT;improve skin integrity;increase knowledge  -MM        Barriers to Rehab previous functional deficit;medically complex  -MM           Relationship/Environment    Primary Source of Support/Comfort spouse  -MM        Lives With spouse  -MM           Resource/Environmental Concerns    Current Living Arrangements home/apartment/condo  -MM           Home Main Entrance    Number of Stairs, Main Entrance one  -MM           Cognitive Assessment/Interventions    Additional Documentation Cognitive Assessment/Intervention (Group)  -MM           Cognitive Assessment/Intervention- PT/OT    Affect/Mental Status (Cognitive) flat/blunted affect;confused  -MM        Orientation Status (Cognition) oriented to;person;unable/difficult to assess  -MM        Follows Commands  (Cognition) follows one step commands;25-49% accuracy;physical/tactile prompts required;repetition of directions required;verbal cues/prompting required  -MM        Cognitive Function (Cognitive) safety deficit;memory deficit  -MM        Safety Deficit (Cognitive) severe deficit;safety precautions awareness;safety precautions follow-through/compliance;insight into deficits/self awareness;awareness of need for assistance;ability to follow commands  -MM        Personal Safety Interventions fall prevention program maintained;gait belt;nonskid shoes/slippers when out of bed;supervised activity  -MM           Safety Issues, Functional Mobility    Safety Issues Affecting Function (Mobility) safety precaution awareness;safety precautions follow-through/compliance;insight into deficits/self awareness;awareness of need for assistance;ability to follow commands  -MM        Impairments Affecting Function (Mobility) strength;balance;cognition;endurance/activity tolerance;motor control;pain;postural/trunk control  -MM           Bed Mobility Assessment/Treatment    Bed Mobility Assessment/Treatment scooting/bridging;supine-sit;sit-supine  -MM        Scooting/Bridging [place] (Bed Mobility) maximum assist (25% patient effort);2 person assist;verbal cues  -MM        Supine-Sit [place] (Bed Mobility) moderate assist (50% patient effort);2 person assist;verbal cues;nonverbal cues (demo/gesture)  -MM        Sit-Supine [place] (Bed Mobility) moderate assist (50% patient effort);2 person assist;verbal cues;nonverbal cues (demo/gesture)  -MM        Bed Mobility, Safety Issues decreased use of arms for pushing/pulling;decreased use of legs for bridging/pushing;impaired trunk control for bed mobility  -MM        Assistive Device (Bed Mobility) bed rails;draw sheet;head of bed elevated  -MM           Transfer Assessment/Treatment    Transfer Assessment/Treatment sit-stand transfer;stand-sit transfer  -MM        Comment  (Transfers) Decreased WB through LLE, L foot in slight equinovarus position during static stance  -MM        Sit-Stand [place] (Transfers) moderate assist (50% patient effort);2 person assist;verbal cues;nonverbal cues (demo/gesture)  -MM        Stand-Sit [place] (Transfers) moderate assist (50% patient effort);2 person assist;verbal cues;nonverbal cues (demo/gesture)  -MM           Sit-Stand Transfer    Assistive Device (Sit-Stand Transfers) walker, front-wheeled  -MM           Stand-Sit Transfer    Assistive Device (Stand-Sit Transfers) walker, front-wheeled  -MM           ADL Assessment/Intervention    BADL Assessment/Intervention lower body dressing  -MM           Lower Body Dressing Assessment/Training    Lower Body Dressing [place] Level don;socks;maximum assist (25% patient effort)  -MM        Lower Body Dressing Position edge of bed sitting  -MM           BADL Safety/Performance    Impairments, BADL Safety/Performance balance;cognition;strength;pain  -MM           General ROM    GENERAL ROM COMMENTS BUE AROM WFL, L HAND 50-75% IMPAIRED  -MM           General Assessment (Manual Muscle Testing)    Comment, General Manual Muscle Testing (MMT) Assessment RUE 4-/5, LUE 3+/5  -MM           Balance    Balance static sitting balance;static standing balance  -MM           Static Sitting Balance    Level of [place] (Unsupported Sitting, Static Balance) minimal assist, 75% patient effort   posterior R lean  -MM        Sitting Position (Unsupported Sitting, Static Balance) sitting on edge of bed  -MM           Static Standing Balance    Level of [place] (Supported Standing, Static Balance) moderate assist, 50 to 74% patient effort  -MM           Sensory Assessment/Intervention    Sensory General Assessment no sensation deficits identified  -MM           Positioning and Restraints    Pre-Treatment Position in bed  -MM        Post Treatment Position bed  -MM        In Bed notified johnathon;brant;call  light within reach;encouraged to call for assist;exit alarm on;with family/caregiver;side rails up x2  -MM           Pain Scale: FACES Pre/Post-Treatment    Pain: FACES Scale, Pretreatment 0-->no hurt  -MM        Pain: FACES Scale, Post-Treatment 2-->hurts little bit  -MM           Wound 04/11/18 1610 Left foot abrasion;blisters    Wound - Properties Group Date first assessed: 04/11/18  -KP Time first assessed: 1610  -KP Present On Admission : yes;picture taken  -KP Side: Left  -KP Location: foot  -KP Type: abrasion;blisters  -KP       Wound 04/11/18 1610 Bilateral upper gluteal    Wound - Properties Group Date first assessed: 04/11/18  -KP Time first assessed: 1610  -KP Present On Admission : yes;picture taken  -KP Side: Bilateral  -KP Orientation: upper  -KP Location: gluteal  -KP Stage, Pressure Injury: Stage 1  -KP       Plan of Care Review    Plan of Care Reviewed With patient  -MM           Clinical Impression (OT)    Date of Referral to OT 04/11/18  -MM        OT Diagnosis impaired mobility and adls  -MM        Patient/Family Goals Statement (OT Eval) return home  -MM        Criteria for Skilled Therapeutic Interventions Met (OT Eval) yes;treatment indicated  -MM        Rehab Potential (OT Eval) good, to achieve stated therapy goals  -MM        Therapy Frequency (OT Eval) 3 times/wk  -MM        Predicted Duration of Therapy Intervention (OT Eval) until d/c  -MM        Care Plan Review (OT) evaluation/treatment results reviewed;risks/benefits reviewed;care plan/treatment goals reviewed;current/potential barriers reviewed;patient/other agree to care plan  -MM        Care Plan Review, Other Participant (OT Eval) spouse  -MM        Anticipated Discharge Disposition (OT) skilled nursing facility (SNF)  -MM           Planned OT Interventions    Planned Therapy Interventions (OT Eval) activity tolerance training;adaptive equipment training;BADL retraining;cognitive/visual perception retraining;functional balance  retraining;neuromuscular control/coordination retraining;occupation/activity based interventions;patient/caregiver education/training;ROM/therapeutic exercise;strengthening exercise;transfer/mobility retraining  -MM           OT Goals    Bed Mobility Goal Selection (OT) bed mobility, OT goal 1  -MM        Transfer Goal Selection (OT) transfer, OT goal 1  -MM        Grooming Goal Selection (OT) grooming, OT goal 1  -MM        Additional Documentation Grooming Goal Selection (OT) (Row)  -MM           Bed Mobility Goal 1 (OT)    Activity/Assistive Device (Bed Mobility Goal 1, OT) bed mobility activities, all  -MM        [place] Level/Cues Needed (Bed Mobility Goal 1, OT) contact guard assist  -MM        Time Frame (Bed Mobility Goal 1, OT) 10 days  -MM        Progress/Outcomes (Bed Mobility Goal 1, OT) goal ongoing  -MM           Transfer Goal 1 (OT)    Activity/Assistive Device (Transfer Goal 1, OT) transfers, all  -MM        [place] Level/Cues Needed (Transfer Goal 1, OT) contact guard assist  -MM        Time Frame (Transfer Goal 1, OT) 10 days  -MM        Progress/Outcome (Transfer Goal 1, OT) goal ongoing  -MM           Grooming Goal 1 (OT)    Activity/Device (Grooming Goal 1, OT) grooming skills, all  -MM        [place] (Grooming Goal 1, OT) set-up required;supervision required   in sitting  -MM        Time Frame (Grooming Goal 1, OT) 10 days  -MM        Progress/Outcome (Grooming Goal 1, OT) goal ongoing  -MM           Living Environment    Home Accessibility stairs to enter home  -MM          User Key  (r) = Recorded By, (t) = Taken By, (c) = Cosigned By    Initials Name Effective Dates    WHIT Ball RN 08/02/16 -     MM SOFI Green/FLORENCIA 04/03/18 -            Occupational Therapy Education     Title: PT OT SLP Therapies (Active)     Topic: Occupational Therapy (Active)     Point: ADL training (Active)     Description: Instruct learner(s) on proper safety adaptation and remediation  techniques during self care or transfers.   Instruct in proper use of assistive devices.   Learning Progress Summary     Learner Status Readiness Method Response Comment Documented by    Patient Active Acceptance E NR OT role, benefits, POC and d/c plannng  04/13/18 1623    Significant Other Active Acceptance E NR OT role, benefits, POC and d/c plannng  04/13/18 1623                      User Key     Initials Effective Dates Name Provider Type Discipline     04/03/18 -  Liu Gómez, OTR/L Occupational Therapist OT                  OT Recommendation and Plan  Outcome Summary/Treatment Plan (OT)  Anticipated Discharge Disposition (OT): skilled nursing facility (SNF)  Planned Therapy Interventions (OT Eval): activity tolerance training, adaptive equipment training, BADL retraining, cognitive/visual perception retraining, functional balance retraining, neuromuscular control/coordination retraining, occupation/activity based interventions, patient/caregiver education/training, ROM/therapeutic exercise, strengthening exercise, transfer/mobility retraining  Therapy Frequency (OT Eval): 3 times/wk  Plan of Care Review  Plan of Care Reviewed With: patient  Plan of Care Reviewed With: patient  Outcome Summary: OT eval completed. pt was mod x2 for bed mobility. Pt was mod x2 for sit to stand with RW. Pt had significant posterior and R lean. Pt was max A to nancy socks.  pt would benefit from skilled Ot to address adls, functional mobility, safety, d/c planning and cognition. Pt is a high fall risk and not safe to d/c home at this time. Recommended d/c SNF.          Outcome Measures     Row Name 04/13/18 1600 04/13/18 7503          How much help from another person do you currently need...    Turning from your back to your side while in flat bed without using bedrails?  -- 3  -ELLIOTT (r) LN (t) ELLIOTT (c)     Moving from lying on back to sitting on the side of a flat bed without bedrails?  -- 2  -ELLIOTT (r) LN (t) ELLIOTT (c)      Moving to and from a bed to a chair (including a wheelchair)?  -- 2  -ELLIOTT (r) LN (t) ELLIOTT (c)     Standing up from a chair using your arms (e.g., wheelchair, bedside chair)?  -- 2  -ELLIOTT (r) LN (t) ELLIOTT (c)     Climbing 3-5 steps with a railing?  -- 1  -ELLIOTT (r) LN (t) ELLIOTT (c)     To walk in hospital room?  -- 2  -ELLIOTT (r) LN (t) ELLIOTT (c)     AM-PAC 6 Clicks Score  -- 12  -ELLIOTT (r) LN (t)        How much help from another is currently needed...    Putting on and taking off regular lower body clothing? 2  -MM  --     Bathing (including washing, rinsing, and drying) 2  -MM  --     Toileting (which includes using toilet bed pan or urinal) 2  -MM  --     Putting on and taking off regular upper body clothing 2  -MM  --     Taking care of personal grooming (such as brushing teeth) 2  -MM  --     Eating meals 2  -MM  --     Score 12  -MM  --        Functional Assessment    Outcome Measure Options AM-PAC 6 Clicks Daily Activity (OT)  -MM AM-PAC 6 Clicks Basic Mobility (PT)  -ELLIOTT (r) LN (t) ELLIOTT (c)       User Key  (r) = Recorded By, (t) = Taken By, (c) = Cosigned By    Initials Name Provider Type    ELLIOTT Priest, PT DPT Physical Therapist    MM Liu Gómez OTR/L Occupational Therapist    LN Pankaj Saucedo, PT Student PT Student          Time Calculation:   OT Start Time: 1438  OT Stop Time: 1538  OT Time Calculation (min): 60 min    Therapy Charges for Today     Code Description Service Date Service Provider Modifiers Qty    [id number]  OT SELFCARE CURRENT 4/13/2018 SOFI Green/L GO, CL 1    [id number]  OT SELFCARE PROJECTED 4/13/2018 SOFI Green/L GO, CK 1    [id number]  OT EVAL MOD COMPLEXITY 4 4/13/2018 SOFI Green/L GO, KX 1          OT G-codes  OT Professional Judgement Used?: Yes  OT Functional Scales Options: AM-PAC 6 Clicks Daily Activity (OT)  Score: 12  Functional Limitation: Self care  Self Care Current Status (): At least 60 percent but less than 80 percent impaired, limited or  restricted  Self Care Goal Status (): At least 40 percent but less than 60 percent impaired, limited or restricted    Liu Gómez, OTR/L  4/13/2018

## 2018-04-13 NOTE — PROGRESS NOTES
[facility] -  Progress Note     LOS: 2 days   Patient Care Team:  Juno Moscoso MD as PCP - General  Juno Moscoso MD as PCP - Family Medicine    Chief Complaint: Shortness of Breath    Subjective     Interval History:   Continued coughing and shortness of breath. Blood pressure improved. Denies chest pain or swelling. Refusing telemetry- no acute events before taken off.     Review of Systems:   Review of Systems   Constitution: Positive for weakness and malaise/fatigue. Negative for chills, decreased appetite, fever, weight gain and weight loss.   HENT: Negative for nosebleeds.    Eyes: Negative for visual disturbance.   Cardiovascular: Negative for chest pain, dyspnea on exertion, leg swelling, near-syncope, orthopnea, palpitations, paroxysmal nocturnal dyspnea and syncope.   Respiratory: Positive for cough and shortness of breath. Negative for hemoptysis and snoring.    Endocrine: Negative for cold intolerance and heat intolerance.   Hematologic/Lymphatic: Negative for bleeding problem. Does not bruise/bleed easily.   Skin: Negative for rash.   Musculoskeletal: Negative for back pain and falls.   Gastrointestinal: Negative for abdominal pain, constipation, diarrhea, heartburn, melena, nausea and vomiting.   Genitourinary: Negative for hematuria.   Neurological: Negative for dizziness, headaches and light-headedness.   Psychiatric/Behavioral: Negative for altered mental status.   Allergic/Immunologic: Negative for persistent infections.        Objective     Vital Sign Min/Max for last 24 hours  Temp  Min: 97.6 °F (36.4 °C)  Max: 98.9 °F (37.2 °C)   BP  Min: 119/44  Max: 155/65   Pulse  Min: 70  Max: 92   Resp  Min: 18  Max: 20   SpO2  Min: 94 %  Max: 100 %   No Data Recorded   Weight  Min: 70.5 kg (155 lb 6.4 oz)  Max: 70.5 kg (155 lb 6.4 oz)     1    04/12/18 2046   Weight: 70.5 kg (155 lb 6.4 oz)         Intake/Output Summary (Last 24 hours) at 04/13/18 1624  Last data filed at  04/13/18 0900   Gross per 24 hour   Intake              740 ml   Output              300 ml   Net              440 ml         Physical Exam:  Physical Exam   Constitutional: He is oriented to person, place, and time. He appears well-developed and well-nourished.   HENT:   Head: Normocephalic and atraumatic.   Eyes: Pupils are equal, round, and reactive to light.   Neck: Normal range of motion. Neck supple. No JVD present. Carotid bruit is not present.   Cardiovascular: Normal rate, regular rhythm, normal heart sounds and intact distal pulses.    Pulmonary/Chest: Effort normal and breath sounds normal.   Abdominal: Soft. Bowel sounds are normal.   Musculoskeletal: Normal range of motion. He exhibits edema (lower leg edema has improved).   Neurological: He is alert and oriented to person, place, and time. He has normal reflexes.   Skin: Skin is warm and dry.   Psychiatric: He has a normal mood and affect. His behavior is normal. Judgment and thought content normal.        Results Review:   Lab Results (last 72 hours)     Procedure Component Value Units Date/Time    Blood Culture - Blood, Blood, Venous Line [[id number]]  (Normal) Collected:  04/11/18 1325    Specimen:  Blood from Arm, Left Updated:  04/13/18 1401     Blood Culture No growth at 2 days    Blood Culture - Blood, Blood, Venous Line [[id number]]  (Normal) Collected:  04/11/18 1329    Specimen:  Blood from Arm, Left Updated:  04/13/18 1401     Blood Culture No growth at 2 days    POC Glucose Once [[id number]]  (Abnormal) Collected:  04/13/18 1136    Specimen:  Blood Updated:  04/13/18 1206     Glucose 194 (H) mg/dL      Comment: : [id number] [facility] ID: [id number]       Comprehensive Metabolic Panel [[id number]]  (Abnormal) Collected:  04/13/18 1116    Specimen:  Blood Updated:  04/13/18 1159     Glucose 207 (H) mg/dL      BUN 18 mg/dL      Creatinine 0.91 mg/dL      Sodium 136 mmol/L      Potassium 3.6 mmol/L      Chloride 100 mmol/L      CO2  24.0 mmol/L      Calcium 9.0 mg/dL      Total Protein 5.6 (L) g/dL      Albumin 3.10 (L) g/dL      ALT (SGPT) 35 U/L      AST (SGOT) 41 U/L      Alkaline Phosphatase 99 U/L      Total Bilirubin 0.6 mg/dL      eGFR Non African Amer 80 mL/min/1.73      Globulin 2.5 gm/dL      A/G Ratio 1.2 g/dL      BUN/Creatinine Ratio 19.8     Anion Gap 12.0 mmol/L     Narrative:       The MDRD GFR formula is only valid for adults with stable renal function between ages 18 and 70.    CBC & Differential [[id number]] Collected:  04/13/18 1116    Specimen:  Blood Updated:  04/13/18 1142    Narrative:       The following orders were created for panel order CBC & Differential.  Procedure                               Abnormality         Status                     ---------                               -----------         ------                     CBC Auto Differential[[id number]]        Abnormal            Final result                 Please view results for these tests on the individual orders.    CBC Auto Differential [[id number]]  (Abnormal) Collected:  04/13/18 1116    Specimen:  Blood Updated:  04/13/18 1142     WBC 6.63 10*3/mm3      RBC 3.28 (L) 10*6/mm3      Hemoglobin 9.7 (L) g/dL      Hematocrit 29.6 (L) %      MCV 90.2 fL      MCH 29.6 pg      MCHC 32.8 (L) g/dL      RDW 13.8 %      RDW-SD 45.3 fl      MPV 10.5 fL      Platelets 169 10*3/mm3      Neutrophil % 82.3 (H) %      Lymphocyte % 8.6 (L) %      Monocyte % 8.1 %      Eosinophil % 0.2 %      Basophil % 0.3 %      Immature Grans % 0.5 %      Neutrophils, Absolute 5.46 10*3/mm3      Lymphocytes, Absolute 0.57 (L) 10*3/mm3      Monocytes, Absolute 0.54 10*3/mm3      Eosinophils, Absolute 0.01 10*3/mm3      Basophils, Absolute 0.02 10*3/mm3      Immature Grans, Absolute 0.03 10*3/mm3      nRBC 0.0 /100 WBC     Respiratory Culture - Sputum, Cough [[id number]] Collected:  04/12/18 0639    Specimen:  Sputum from Cough Updated:  04/13/18 0819     Respiratory Culture --      Light  growth (2+) Normal Respiratory Flaquita     Gram Stain Result Greater than 25 WBCs per low power field      Moderate (3+) Mixed gram positive flaquita    POC Glucose Once [[id number]]  (Abnormal) Collected:  04/13/18 0733    Specimen:  Blood Updated:  04/13/18 0803     Glucose 281 (H) mg/dL      Comment: : [id number] Emigdio Mena ID: [id number]       POC Glucose Once [[id number]]  (Abnormal) Collected:  04/12/18 2054    Specimen:  Blood Updated:  04/12/18 2116     Glucose 247 (H) mg/dL      Comment: : [id number] Em BeattyleyMeter ID: [id number]       POC Glucose Once [[id number]]  (Abnormal) Collected:  04/12/18 1842    Specimen:  Blood Updated:  04/12/18 1854     Glucose 154 (H) mg/dL      Comment: : [id number] Nico KaleeMeter ID: [id number]       POC Glucose Once [[id number]]  (Normal) Collected:  04/12/18 1628    Specimen:  Blood Updated:  04/12/18 1650     Glucose 102 mg/dL      Comment: : [id number] Nico KaleeMeter ID: [id number]       POC Glucose Once [[id number]]  (Abnormal) Collected:  04/12/18 1138    Specimen:  Blood Updated:  04/12/18 1215     Glucose 234 (H) mg/dL      Comment: : [id number] Nico KaleeMeter ID: [id number]       POC Glucose Once [[id number]]  (Abnormal) Collected:  04/12/18 0738    Specimen:  Blood Updated:  04/12/18 0749     Glucose 167 (H) mg/dL      Comment: : [id number] Nico KaleeMeter ID: [id number]       Basic Metabolic Panel [[id number]]  (Abnormal) Collected:  04/12/18 0409    Specimen:  Blood Updated:  04/12/18 0509     Glucose 139 (H) mg/dL      BUN 26 (H) mg/dL      Creatinine 1.09 mg/dL      Sodium 136 mmol/L      Potassium 4.1 mmol/L      Chloride 100 mmol/L      CO2 26.0 mmol/L      Calcium 9.1 mg/dL      eGFR Non African Amer 65 mL/min/1.73      BUN/Creatinine Ratio 23.9     Anion Gap 10.0 mmol/L     Narrative:       The MDRD GFR formula is only valid for adults with stable renal function between ages 18 and 70.    CBC (No Diff)  [[id number]]  (Abnormal) Collected:  04/12/18 0409    Specimen:  Blood Updated:  04/12/18 0503     WBC 7.03 10*3/mm3      RBC 3.39 (L) 10*6/mm3      Hemoglobin 10.0 (L) g/dL      Hematocrit 31.6 (L) %      MCV 93.2 fL      MCH 29.5 pg      MCHC 31.6 (L) g/dL      RDW 14.0 %      RDW-SD 47.0 fl      MPV 10.8 fL      Platelets 179 10*3/mm3     POC Glucose Once [[id number]]  (Abnormal) Collected:  04/11/18 2057    Specimen:  Blood Updated:  04/11/18 2109     Glucose 235 (H) mg/dL      Comment: : [id number] [employer] SusanMeter ID: [id number]       Legionella Antigen, Urine - Urine, Urine, Clean Catch [[id number]]  (Normal) Collected:  04/11/18 1401    Specimen:  Urine from Urine, Clean Catch Updated:  04/11/18 1809     LEGIONELLA ANTIGEN, URINE Negative    S. Pneumo Ag Urine or CSF - Urine, Urine, Clean Catch [[id number]]  (Normal) Collected:  04/11/18 1401    Specimen:  Urine from Urine, Clean Catch Updated:  04/11/18 1809     Strep Pneumo Ag Negative    Hemoglobin A1c [[id number]] Collected:  04/11/18 1239    Specimen:  Blood Updated:  04/11/18 1737     Hemoglobin A1C 12.2 %     Narrative:       Less than 6.0           Non-Diabetic Range  6.0-7.0                 ADA Therapeutic Target  Greater than 7.0        Action Suggested    Urinalysis With / Culture If Indicated - Urine, Clean Catch [[id number]]  (Abnormal) Collected:  04/11/18 1401    Specimen:  Urine from Urine, Clean Catch Updated:  04/11/18 1413     Color, UA Dark Yellow (A)     Appearance, UA Clear     pH, UA <=5.0     Specific Gravity, UA >1.030 (H)     Glucose,  mg/dL (1+) (A)     Ketones, UA Trace (A)     Bilirubin, UA Small (1+) (A)     Blood, UA Negative     Protein, UA Trace (A)     Leuk Esterase, UA Negative     Nitrite, UA Negative     Urobilinogen, UA 1.0 E.U./dL    Narrative:       Urine microscopic not indicated.    Comprehensive Metabolic Panel [[id number]]  (Abnormal) Collected:  04/11/18 1330    Specimen:  Blood Updated:  04/11/18 1355      Glucose 113 (H) mg/dL      BUN 27 (H) mg/dL      Creatinine 1.15 mg/dL      Sodium 135 mmol/L      Potassium 4.3 mmol/L      Chloride 98 mmol/L      CO2 31.0 mmol/L      Calcium 9.2 mg/dL      Total Protein 6.2 (L) g/dL      Albumin 3.30 (L) g/dL      ALT (SGPT) 17 U/L      AST (SGOT) 25 U/L      Alkaline Phosphatase 70 U/L      Total Bilirubin 0.7 mg/dL      eGFR Non African Amer 61 mL/min/1.73      Globulin 2.9 gm/dL      A/G Ratio 1.1 g/dL      BUN/Creatinine Ratio 23.5     Anion Gap 6.0 mmol/L     Narrative:       The MDRD GFR formula is only valid for adults with stable renal function between ages 18 and 70.    CBC & Differential [[id number]] Collected:  04/11/18 1239    Specimen:  Blood Updated:  04/11/18 1303    Narrative:       The following orders were created for panel order CBC & Differential.  Procedure                               Abnormality         Status                     ---------                               -----------         ------                     CBC Auto Differential[[id number]]        Abnormal            Final result                 Please view results for these tests on the individual orders.    CBC Auto Differential [[id number]]  (Abnormal) Collected:  04/11/18 1239    Specimen:  Blood Updated:  04/11/18 1303     WBC 10.76 10*3/mm3      RBC 3.56 (L) 10*6/mm3      Hemoglobin 10.6 (L) g/dL      Hematocrit 33.0 (L) %      MCV 92.7 fL      MCH 29.8 pg      MCHC 32.1 (L) g/dL      RDW 13.9 %      RDW-SD 46.9 fl      MPV 10.6 fL      Platelets 210 10*3/mm3      Neutrophil % 81.1 (H) %      Lymphocyte % 10.3 (L) %      Monocyte % 6.6 %      Eosinophil % 0.7 %      Basophil % 0.5 %      Immature Grans % 0.8 %      Neutrophils, Absolute 8.73 (H) 10*3/mm3      Lymphocytes, Absolute 1.11 10*3/mm3      Monocytes, Absolute 0.71 10*3/mm3      Eosinophils, Absolute 0.07 10*3/mm3      Basophils, Absolute 0.05 10*3/mm3      Immature Grans, Absolute 0.09 (H) 10*3/mm3      nRBC 0.0 /100 WBC     Lactic  Acid, Plasma [[id number]]  (Normal) Collected:  04/11/18 1239    Specimen:  Blood Updated:  04/11/18 1300     Lactate 1.4 mmol/L     POC Glucose Once [[id number]]  (Normal) Collected:  04/11/18 1233    Specimen:  Blood Updated:  04/11/18 1246     Glucose 105 mg/dL      Comment: : [id number] Poppy Rodriguez ID: [id number]                 Echo EF Estimated  Lab Results   Component Value Date    ECHOEFEST 30 04/12/2018         Cath Ejection Fraction Quantitative  No results found for: CATHEF        Medication Review: yes  Current Facility-Administered Medications   Medication Dose Route Frequency Provider Last Rate Last Dose   • acetaminophen (TYLENOL) tablet 650 mg  650 mg Oral Q4H PRN ROCIO Hatch       • amiodarone (PACERONE) tablet 200 mg  200 mg Oral Daily Cari Ayoub APRN   200 mg at 04/13/18 0932   • aspirin EC tablet 81 mg  81 mg Oral Daily Cari Ayoub APRN   81 mg at 04/13/18 0932   • atorvastatin (LIPITOR) tablet 40 mg  40 mg Oral Nightly Ishaan Gong MD   40 mg at 04/12/18 2019   • carvedilol (COREG) tablet 6.25 mg  6.25 mg Oral Q12H Ishaan Gong MD   6.25 mg at 04/13/18 0932   • clopidogrel (PLAVIX) tablet 75 mg  75 mg Oral Daily Cari Ayoub APRN   75 mg at 04/13/18 0933   • dextrose (D50W) solution 25 g  25 g Intravenous Q15 Min PRN ROCIO Hatch       • dextrose (GLUTOSE) oral gel 15 g  15 g Oral Q15 Min PRN ROCIO Hatch       • docusate sodium (COLACE) capsule 100 mg  100 mg Oral TID PRN Cari Ayoub APRTAMIA       • enoxaparin (LOVENOX) syringe 40 mg  40 mg Subcutaneous Q24H ROCIO Hatch   40 mg at 04/12/18 1727   • ferrous sulfate tablet 325 mg  325 mg Oral Daily With Breakfast ROCIO Hatch   325 mg at 04/13/18 0932   • folic acid (FOLVITE) tablet 2 mg  2 mg Oral Daily Cari Ayoub APRN   2 mg at 04/13/18 0933   • glucagon (human recombinant) (GLUCAGEN DIAGNOSTIC) injection 1 mg  1 mg Subcutaneous PRN  ROCIO Hatch       • HYDROcodone-acetaminophen (NORCO) 5-325 MG per tablet 1 tablet  1 tablet Oral Q4H PRN ROCIO Hatch   1 tablet at 04/13/18 0139   • insulin lispro (humaLOG) injection 0-9 Units  0-9 Units Subcutaneous 4x Daily With Meals & Nightly ROCIO Hatch   2 Units at 04/13/18 1335   • ipratropium-albuterol (DUO-NEB) nebulizer solution 3 mL  3 mL Nebulization Q6H While Awake - RT ROCIO Hatch   3 mL at 04/13/18 1347   • midodrine (PROAMATINE) tablet 5 mg  5 mg Oral TID ROCIO Hatch   5 mg at 04/13/18 0933   • ondansetron (ZOFRAN) tablet 4 mg  4 mg Oral Q6H PRN ROCIO Hathc        Or   • ondansetron ODT (ZOFRAN-ODT) disintegrating tablet 4 mg  4 mg Oral Q6H PRN ROCIO Hatch        Or   • ondansetron (ZOFRAN) injection 4 mg  4 mg Intravenous Q6H PRN ROCIO Hatch       • pantoprazole (PROTONIX) EC tablet 40 mg  40 mg Oral QAM ROCIO Hatch   40 mg at 04/13/18 0935   • piperacillin-tazobactam (ZOSYN) 4.5 g in iso-osmotic dextrose 100 mL IVPB (premix)  4.5 g Intravenous Q8H ROCIO Hatch   4.5 g at 04/13/18 0935   • sodium chloride 0.9 % flush 1-10 mL  1-10 mL Intravenous PRN ROCIO Hatch       • sodium chloride 0.9 % flush 10 mL  10 mL Intravenous PRN Sonia Wong MD       • traMADol (ULTRAM) tablet 50 mg  50 mg Oral Q6H PRN ROCIO Hatch       • vancomycin 1250 mg/250 mL 0.9% NS IVPB ([facility])  1,250 mg Intravenous Q24H ROCIO Hatch   Stopped at 04/12/18 2035         Assessment/Plan     Principal Problem:    Healthcare-associated pneumonia  Active Problems:    Dehydration    Chronic systolic congestive heart failure    Shortness of breath    V-tach    Other hyperlipidemia    Type 2 diabetes mellitus    Dementia    Plan:  Blood Pressure remains stable  Patient refusing telemetry  Continue Low Salt Diet  Coreg started back  Will see PRN can follow up with regular cardiologist in  [place]    Further recommendations per Dr. Giuseppe Amin, APRN  04/13/18  4:24 PM

## 2018-04-13 NOTE — PROGRESS NOTES
"Pharmacy Dosing Service  Pharmacokinetics  Vancomycin Follow-up Evaluation    Assessment/Action/Plan:  Pharmacy dosing Vancomycin for HCAP. Scr=1.09 (see below). Patient remains on Zosyn as well. Ordered trough prior to 4th dose (18:00 4-14-18). Pharmacy will continue to monitor renal function and adjust dose accordingly.     Subjective:  Delon Cid is a 82 y.o. male currently on Vancomycin 1250 mg IV every 24 hours for the treatment of HCAP, day 3 of therapy.    Objective:  Ht: 180.3 cm (71\"); Wt: 70.5 kg (155 lb 6.4 oz)  Estimated Creatinine Clearance: 53 mL/min (by C-G formula based on SCr of 1.09 mg/dL).   Lab Results   Component Value Date    CREATININE 1.09 04/12/2018    CREATININE 1.15 04/11/2018    CREATININE 0.78 03/09/2018      Lab Results   Component Value Date    WBC 7.03 04/12/2018    WBC 10.76 04/11/2018    WBC 6.88 03/09/2018        Culture Results:  Microbiology Results (last 10 days)       Procedure Component Value - Date/Time    Respiratory Culture - Sputum, Cough [[id number]] Collected:  04/12/18 0639    Lab Status:  Preliminary result Specimen:  Sputum from Cough Updated:  04/12/18 1318     Gram Stain Result Greater than 25 WBCs per low power field      Moderate (3+) Mixed gram positive flaquita    Legionella Antigen, Urine - Urine, Urine, Clean Catch [[id number]]  (Normal) Collected:  04/11/18 1401    Lab Status:  Final result Specimen:  Urine from Urine, Clean Catch Updated:  04/11/18 1809     LEGIONELLA ANTIGEN, URINE Negative    S. Pneumo Ag Urine or CSF - Urine, Urine, Clean Catch [[id number]]  (Normal) Collected:  04/11/18 1401    Lab Status:  Final result Specimen:  Urine from Urine, Clean Catch Updated:  04/11/18 1809     Strep Pneumo Ag Negative    Blood Culture - Blood, Blood, Venous Line [[id number]]  (Normal) Collected:  04/11/18 1329    Lab Status:  Preliminary result Specimen:  Blood from Arm, Left Updated:  04/12/18 1401     Blood Culture No growth at 24 hours    Blood Culture - " Blood, Blood, Venous Line [[id number]]  (Normal) Collected:  04/11/18 1325    Lab Status:  Preliminary result Specimen:  Blood from Arm, Left Updated:  04/12/18 1401     Blood Culture No growth at 24 hours          Stephanie, Pharm D  7:27 AM  4/13/2018

## 2018-04-13 NOTE — PLAN OF CARE
Problem: Patient Care Overview  Goal: Plan of Care Review  Outcome: Ongoing (interventions implemented as appropriate)   04/13/18 0356   OTHER   Outcome Summary VSS, Vpaced 74-83. few c/o pain in right shoulder, treated with prn pain meds with relief. pt started taking pills in applesauce. good urine output. no new issues, continue to monitor     Goal: Individualization and Mutuality  Outcome: Ongoing (interventions implemented as appropriate)    Goal: Discharge Needs Assessment  Outcome: Ongoing (interventions implemented as appropriate)    Goal: Interprofessional Rounds/Family Conf  Outcome: Ongoing (interventions implemented as appropriate)      Problem: Fluid Volume Deficit (Adult)  Goal: Optimal Fluid Balance  Outcome: Ongoing (interventions implemented as appropriate)      Problem: Fall Risk (Adult)  Goal: Absence of Fall  Outcome: Ongoing (interventions implemented as appropriate)      Problem: Wound (Includes Pressure Injury) (Adult)  Goal: Signs and Symptoms of Listed Potential Problems Will be Absent, Minimized or Managed (Wound)  Outcome: Ongoing (interventions implemented as appropriate)      Problem: Skin Injury Risk (Adult)  Goal: Identify Related Risk Factors and Signs and Symptoms  Outcome: Ongoing (interventions implemented as appropriate)    Goal: Skin Health and Integrity  Outcome: Ongoing (interventions implemented as appropriate)

## 2018-04-13 NOTE — PLAN OF CARE
Problem: Patient Care Overview  Goal: Plan of Care Review  Outcome: Ongoing (interventions implemented as appropriate)   04/13/18 3597   Coping/Psychosocial   Plan of Care Reviewed With patient;spouse   OTHER   Outcome Summary PT eval complete. Pt presents with significant weakness, impaired balance, and decreased activity tolerance. Pt also has poor safety awareness and memory secondary to dementia. Pt was mod A x2 for bed mobility, and mod A x2 for sit <-> stand with RW. Pt needed significant assist, verbal and tactile cues for weight shifting and postural corrections in standing. Skilled therapy indicated to address pt's deficits to improve safety and independence with functional mobility. Pt is unsafe to return home at this time, recommend DC to SNF for continued therapy.

## 2018-04-13 NOTE — PROGRESS NOTES
Continued Stay Note  [facility]     Patient Name: Delon Cid  MRN: [id number]  Today's Date: 4/13/2018    Admit Date: 4/11/2018          Discharge Plan     Row Name 04/13/18 1546       Plan    Plan PT was assessed by therapy after SW met with PT and family earlier. PT did not do well with therapy and it was suggested again that PT go to inpatient rehab before going home. PT and spouse are receptive to this now. SW met with PT and spouse, other family members had left. PT's spouse has asked that PT be listed with [facility]. PT has been listed, will await bed offers or denials.     Patient/Family in Agreement with Plan yes    Row Name 04/13/18 8108       Plan    Plan PT, spouse and family contniue to refuse SNF placement. PT plans to dc home with spouse and wants to resume HH previously ordered by Dr. Moscoso upon dc from SNF last week. PT and spouse do not know which agency they are using. SW will call Dr. Moscoso's office to find out which HH is involved. PT's family is asking about PT qualifying for a hospital bed upon dc. If orders are written, SW will arrange.     Patient/Family in Agreement with Plan yes              Discharge Codes    No documentation.           NEELA Escalante

## 2018-04-13 NOTE — THERAPY EVALUATION
Acute Care - Physical Therapy Initial Evaluation  [facility]     Patient Name: Delon Cid  : 1935  MRN: [id number]  Today's Date: 2018   Onset of Illness/Injury or Date of Surgery: 18  Date of Referral to PT: 18  Referring Physician: ROCIO Renyolds      Admit Date: 2018    Visit Dx:     ICD-10-CM ICD-9-CM   1. Dehydration E86.0 276.51   2. Hypotension, unspecified hypotension type I95.9 458.9   3. Oropharyngeal dysphagia R13.12 787.22   4. Impaired functional mobility, balance, gait, and endurance Z74.09 V49.89     Patient Active Problem List   Diagnosis   • Dehydration   • Healthcare-associated pneumonia   • Chronic systolic congestive heart failure   • Shortness of breath   • V-tach   • Other hyperlipidemia   • Type 2 diabetes mellitus   • Dementia     Past Medical History:   Diagnosis Date   • CAD (coronary artery disease)    • Cardiomyopathy    • Chronic systolic CHF (congestive heart failure)    • Dementia    • Hypertension    • Insulin dependent diabetes mellitus    • Myocardial infarction    • Stroke    • Sustained VT (ventricular tachycardia)      Past Surgical History:   Procedure Laterality Date   • BACK SURGERY     • CARDIAC DEFIBRILLATOR PLACEMENT     • CAROTID ENDARTERECTOMY Right    • CATARACT EXTRACTION, BILATERAL     • CORONARY ARTERY BYPASS GRAFT     • KYPHOPLASTY          PT ASSESSMENT (last 12 hours)      Physical Therapy Evaluation     Row Name 18 1458          PT Evaluation Time/Intention    Subjective Information complains of;pain  -ELLIOTT (r) LN (t) ELLIOTT (c)     Document Type evaluation   See MAR  -ELLIOTT (r) LN (t) ELLIOTT (c)     Mode of Treatment physical therapy  -ELLIOTT (r) LN (t) ELLIOTT (c)     Total Evaluation Minutes, Physical Therapy 55  -ELLIOTT (r) LN (t) ELLIOTT (c)     Patient Effort adequate  -ELLIOTT (r) LN (t) ELLIOTT (c)     Symptoms Noted During/After Treatment fatigue  -ELLIOTT (r) LN (t) ELLIOTT (c)     Row Name 18 9358          General Information    Patient Profile  Reviewed? yes  -ELLIOTT (r) LN (t) ELLIOTT (c)     Onset of Illness/Injury or Date of Surgery 04/11/18  -ELLIOTT (r) LN (t) ELLIOTT (c)     Referring Physician ROCIO Reynolds  -ELLIOTT (r) LN (t) ELLIOTT (c)     Patient Observations cooperative;agree to therapy  -ELLIOTT (r) LN (t) ELLIOTT (c)     Patient/Family Observations spouse present  -ELLIOTT (r) LN (t) ELLIOTT (c)     General Observations of Patient Pt in bed, fowlers  -ELLIOTT (r) LN (t) ELLIOTT (c)     Prior Level of Function min assist:;bed mobility;gait;transfer   Spouse states pt is getting more difficult to care for  -ELLIOTT (r) LN (t) ELLIOTT (c)     Equipment Currently Used at Home cane, quad;walker, rolling;shower chair;commode, bedside  -ELLIOTT (r) RODOLFO (t) ELLIOTT (c)     Pertinent History of Current Functional Problem Pt recently seen at [facility] for spinal fx and kyphoplasty after fall. Pt was at SNF for 2 days afterward before leaving d/t desire to return home. Pt had multiple falls at home, spouse took him to PCP where low blood pressure was noted. Chest Xray 4/11 noted bilateral lower lobe infiltrates/pneumonia.  -ELLIOTT (r) LN (t) ELLIOTT (c)     Existing Precautions/Restrictions fall  -ELLIOTT (r) LN (t) ELLIOTT (c)     Limitations/Impairments safety/cognitive  -ELLIOTT (r) LN (t) ELLIOTT (c)     Risks Reviewed patient:;spouse/S.O.:;LOB;nausea/vomiting;dizziness;increased discomfort;change in vital signs  -ELLIOTT (r) LN (t) ELLIOTT (c)     Benefits Reviewed patient:;spouse/S.O.:;improve function;increase independence;increase strength;increase balance;decrease pain;decrease risk of DVT;increase knowledge  -ELLIOTT (r) LN (t) ELLIOTT (c)     Barriers to Rehab previous functional deficit;medically complex   Hx of previous CVA  -ELLIOTT (r) LN (t) ELLIOTT (c)     Row Name 04/13/18 1455          Relationship/Environment    Primary Source of Support/Comfort spouse  -ELLIOTT (r) LN (t) ELLIOTT (c)     Lives With spouse  -ELLIOTT (r) RODOLFO (t) ELLIOTT (c)     Row Name 04/13/18 8515          Resource/Environmental Concerns    Current Living Arrangements home/apartment/condo   -ELLIOTT (r) LN (t) ELLIOTT (c)     Row Name 04/13/18 1458          Home Main Entrance    Number of Stairs, Main Entrance one  -ELLIOTT (r) LN (t) ELLIOTT (c)     Row Name 04/13/18 1458          Cognitive Assessment/Interventions    Additional Documentation Cognitive Assessment/Intervention (Group)  -ELLIOTT (r) LN (t) ELLIOTT (c)     Row Name 04/13/18 1458          Cognitive Assessment/Intervention- PT/OT    Affect/Mental Status (Cognitive) flat/blunted affect;confused  -ELLIOTT (r) LN (t) ELLIOTT (c)     Orientation Status (Cognition) oriented to;person;unable/difficult to assess  -ELLIOTT (r) LN (t) ELLIOTT (c)     Follows Commands (Cognition) follows one step commands;25-49% accuracy;physical/tactile prompts required;repetition of directions required;verbal cues/prompting required  -ELLIOTT (r) LN (t) ELLIOTT (c)     Cognitive Function (Cognitive) safety deficit;memory deficit  -ELLIOTT (r) LN (t) ELLIOTT (c)     Safety Deficit (Cognitive) severe deficit;safety precautions awareness;safety precautions follow-through/compliance;insight into deficits/self awareness;awareness of need for assistance;ability to follow commands  -ELLIOTT (r) LN (t) ELLIOTT (c)     Personal Safety Interventions fall prevention program maintained;gait belt;nonskid shoes/slippers when out of bed;supervised activity  -ELLIOTT (r) LN (t) ELLIOTT (c)     Row Name 04/13/18 1458          Safety Issues, Functional Mobility    Safety Issues Affecting Function (Mobility) safety precaution awareness;safety precautions follow-through/compliance;insight into deficits/self awareness;awareness of need for assistance;ability to follow commands  -ELLIOTT (r) LN (t) ELLIOTT (c)     Impairments Affecting Function (Mobility) strength;balance;cognition;endurance/activity tolerance;motor control;pain;postural/trunk control  -ELLIOTT (r) LN (t) ELLIOTT (c)     Row Name 04/13/18 1458          Bed Mobility Assessment/Treatment    Bed Mobility Assessment/Treatment scooting/bridging;supine-sit;sit-supine  -ELLIOTT (r) LN (t) ELLIOTT (c)     Scooting/Bridging [place] (Bed  Mobility) maximum assist (25% patient effort);2 person assist;verbal cues   with draw sheet  -ELLIOTT (r) LN (t) ELLIOTT (c)     Supine-Sit [place] (Bed Mobility) moderate assist (50% patient effort);2 person assist;verbal cues;nonverbal cues (demo/gesture)  -ELLIOTT (r) LN (t) ELLIOTT (c)     Sit-Supine [place] (Bed Mobility) moderate assist (50% patient effort);2 person assist;verbal cues;nonverbal cues (demo/gesture)  -ELLIOTT (r) LN (t) ELLIOTT (c)     Bed Mobility, Safety Issues decreased use of arms for pushing/pulling;decreased use of legs for bridging/pushing;impaired trunk control for bed mobility  -ELLIOTT (r) LN (t) ELLIOTT (c)     Assistive Device (Bed Mobility) bed rails;draw sheet;head of bed elevated  -ELLIOTT (r) LN (t) ELLIOTT (c)     Row Name 04/13/18 1454          Transfer Assessment/Treatment    Transfer Assessment/Treatment sit-stand transfer;stand-sit transfer  -ELLIOTT (r) LN (t) ELLIOTT (c)     Comment (Transfers) Decreased WB through LLE, L foot in slight [place] position during static stance  -ELLIOTT (r) LN (t) ELLIOTT (c)     Sit-Stand [place] (Transfers) moderate assist (50% patient effort);2 person assist;verbal cues;nonverbal cues (demo/gesture)  -ELLIOTT (r) LN (t) ELLIOTT (c)     Stand-Sit [place] (Transfers) moderate assist (50% patient effort);2 person assist;verbal cues;nonverbal cues (demo/gesture)  -ELLIOTT (r) LN (t) ELLIOTT (c)     Row Name 04/13/18 1450          Sit-Stand Transfer    Assistive Device (Sit-Stand Transfers) walker, front-wheeled  -ELLIOTT (r) LN (t) ELLIOTT (c)     Row Name 04/13/18 1450          Stand-Sit Transfer    Assistive Device (Stand-Sit Transfers) walker, front-wheeled  -ELLIOTT (r) LN (t) ELLIOTT (c)     Row Name 04/13/18 1455          General ROM    GENERAL ROM COMMENTS BLE AROM WFL except L ankle dorsiflexion limited, achieved neutral with AAROM  -ELLIOTT (r) LN (t) ELLIOTT (c)     Row Name 04/13/18 6909          General Assessment (Manual Muscle Testing)    Comment, General Manual Muscle Testing (MMT) Assessment RLE grossly 4-/5, LLE grossly  3+/5  -ELLIOTT (r) LN (t) ELLIOTT (c)     Row Name 04/13/18 1458          Motor Assessment/Intervention    Additional Documentation Balance (Group)  -ELLIOTT (r) LN (t) ELLIOTT (c)     Row Name 04/13/18 1458          Balance    Balance static sitting balance;static standing balance  -ELLIOTT (r) LN (t) ELLIOTT (c)     Row Name 04/13/18 1458          Static Sitting Balance    Level of [place] (Unsupported Sitting, Static Balance) minimal assist, 75% patient effort   POsterior R lean in sitting, severe forward flexed head  -ELLIOTT (r) LN (t) ELLIOTT (c)     Sitting Position (Unsupported Sitting, Static Balance) sitting on edge of bed  -ELLIOTT (r) LN (t) ELLIOTT (c)     Row Name 04/13/18 1458          Static Standing Balance    Level of [place] (Supported Standing, Static Balance) moderate assist, 50 to 74% patient effort   mod x2, verbal and tactile cues to correct post. lean  -ELLIOTT (r) LN (t) ELLIOTT (c)     Time Able to Maintain Position (Supported Standing, Static Balance) 45 to 60 seconds  -ELLIOTT (r) LN (t) ELLIOTT (c)     Row Name 04/13/18 1458          Sensory Assessment/Intervention    Sensory General Assessment no sensation deficits identified  -ELLIOTT (r) LN (t) ELLIOTT (c)     Row Name 04/13/18 1458          Pain Assessment    Additional Documentation Pain Scale: FACES Pre/Post-Treatment (Group)  -ELLIOTT (r) LN (t) ELLIOTT (c)     Row Name 04/13/18 1458          Pain Scale: FACES Pre/Post-Treatment    Pain: FACES Scale, Pretreatment 0-->no hurt  -ELLIOTT (r) LN (t) ELLIOTT (c)     Pain: FACES Scale, Post-Treatment 2-->hurts little bit   R shoulder  -ELLIOTT (r) LN (t) ELLIOTT (c)     Row Name             Wound 04/11/18 1610 Left foot abrasion;blisters    Wound - Properties Group Date first assessed: 04/11/18  -KP Time first assessed: 1610  -KP Present On Admission : yes;picture taken  -KP Side: Left  -KP Location: foot  -KP Type: abrasion;blisters  -KP    Row Name             Wound 04/11/18 1610 Bilateral upper gluteal    Wound - Properties Group Date first assessed: 04/11/18  -KP Time first  assessed: 1610  -KP Present On Admission : yes;picture taken  -KP Side: Bilateral  -KP Orientation: upper  -KP Location: gluteal  -KP Stage, Pressure Injury: Stage 1  -KP    Row Name 04/13/18 1458          Plan of Care Review    Plan of Care Reviewed With patient  -ELLIOTT     Row Name 04/13/18 1458          Physical Therapy Clinical Impression    Date of Referral to PT 04/11/18  -ELLIOTT (r) LN (t) ELLIOTT (c)     PT Diagnosis (PT Clinical Impression) Impaired functional mobility  -ELLIOTT (r) LN (t) ELLIOTT (c)     Patient/Family Goals Statement (PT Clinical Impression) Return home  -ELLIOTT (r) LN (t) ELLIOTT (c)     Criteria for Skilled Interventions Met (PT Clinical Impression) yes;treatment indicated  -ELLIOTT (r) LN (t) ELLIOTT (c)     Pathology/Pathophysiology Noted (Describe Specifically for Each System) musculoskeletal  -ELLIOTT (r) LN (t) ELLIOTT (c)     Impairments Found (describe specific impairments) gait, locomotion, and balance;aerobic capacity/endurance  -ELLIOTT (r) LN (t) ELLIOTT (c)     Rehab Potential (PT Clinical Summary) fair, will monitor progress closely  -ELLIOTT (r) LN (t) ELLIOTT (c)     Predicted Duration of Therapy (PT) until DC  -ELLIOTT (r) LN (t) ELLIOTT (c)     Care Plan Review (PT) evaluation/treatment results reviewed;care plan/treatment goals reviewed;risks/benefits reviewed;current/potential barriers reviewed;patient/other agree to care plan  -ELLIOTT (r) LN (t) ELLIOTT (c)     Care Plan Review, Other Participant (PT Clinical Impression) spouse  -ELLIOTT (r) LN (t) ELLIOTT (c)     Row Name 04/13/18 1458          Vital Signs    Intra SpO2 (%) 97  -ELLIOTT (r) LN (t) ELLIOTT (c)     O2 Delivery Intra Treatment room air  -ELLIOTT (r) LN (t) ELLIOTT (c)     Row Name 04/13/18 1458          Physical Therapy Goals    Bed Mobility Goal Selection (PT) bed mobility, PT goal 1  -ELLIOTT (r) LN (t) ELLIOTT (c)     Transfer Goal Selection (PT) transfer, PT goal 1  -ELLIOTT (r) LN (t) ELLIOTT (c)     Gait Training Goal Selection (PT) gait training, PT goal 1  -ELLIOTT (r) LN (t) ELLIOTT (c)     Balance Goal Selection (PT) balance, PT goal 1   -ELLIOTT (r) LN (t) ELLIOTT (c)     Additional Documentation Balance Goal Selection (PT) (Row)  -ELLIOTT (r) LN (t) ELLIOTT (c)     Row Name 04/13/18 1458          Bed Mobility Goal 1 (PT)    Activity/Assistive Device (Bed Mobility Goal 1, PT) rolling to left;rolling to right;bridging;scooting;sit to supine/supine to sit  -ELLIOTT (r) LN (t) ELLIOTT (c)     [place] Level/Cues Needed (Bed Mobility Goal 1, PT) supervision required;verbal cues required  -ELLIOTT (r) LN (t) ELLIOTT (c)     Time Frame (Bed Mobility Goal 1, PT) long term goal (LTG);by discharge  -ELLIOTT (r) LN (t) ELLIOTT (c)     Barriers (Bed Mobility Goal 1, PT) Decreased strength  -ELLIOTT (r) LN (t) ELLIOTT (c)     Progress/Outcomes (Bed Mobility Goal 1, PT) goal ongoing  -ELLIOTT (r) LN (t) ELLIOTT (c)     Row Name 04/13/18 1457          Transfer Goal 1 (PT)    Activity/Assistive Device (Transfer Goal 1, PT) sit-to-stand/stand-to-sit;bed-to-chair/chair-to-bed;walker, rolling  -ELLIOTT (r) LN (t) ELLIOTT (c)     [place] Level/Cues Needed (Transfer Goal 1, PT) contact guard assist  -ELLIOTT (r) LN (t) ELLIOTT (c)     Time Frame (Transfer Goal 1, PT) long term goal (LTG);by discharge  -ELLIOTT (r) LN (t) ELLIOTT (c)     Barriers (Transfers Goal 1, PT) decreased strength and balance  -ELLIOTT (r) LN (t) ELLIOTT (c)     Progress/Outcome (Transfer Goal 1, PT) goal ongoing  -ELLIOTT (r) LN (t) ELLIOTT (c)     Row Name 04/13/18 1458          Gait Training Goal 1 (PT)    Activity/Assistive Device (Gait Training Goal 1, PT) gait (walking locomotion);assistive device use;decrease fall risk;diminish gait deviation;improve balance and speed;increase endurance/gait distance;walker, rolling  -ELLIOTT (r) LN (t) ELLIOTT (c)     [place] Level (Gait Training Goal 1, PT) minimum assist (75% or more patient effort)  -ELLIOTT (r) LN (t) ELLIOTT (c)     Distance (Gait Goal 1, PT) 15' x2 with seated rest  -ELLIOTT (r) LN (t) ELLIOTT (c)     Time Frame (Gait Training Goal 1, PT) long term goal (LTG);by discharge  -ELLIOTT (r) LN (t) ELLIOTT (c)     Barriers (Gait Training Goal 1, PT) Decreased strength, balance,  endurance  -ELLIOTT (r) LN (t) ELLIOTT (c)     Progress/Outcome (Gait Training Goal 1, PT) goal ongoing  -ELLIOTT (r) LN (t) ELLIOTT (c)     Row Name 04/13/18 1458          Balance Goal 1 (PT)    Activity/Assistive Device (Balance Goal 1, PT) assistive device use;standing, static;walker, rolling   Stand x 2 minutes with RW  -ELLIOTT (r) LN (t) ELLIOTT (c)     [place] Level/Cues Needed (Balance Goal 1, PT) contact guard assist  -ELLIOTT (r) LN (t) ELLIOTT (c)     Time Frame (Balance Goal 1, PT) long term goal (LTG);by discharge  -ELLIOTT (r) LN (t) ELLIOTT (c)     Barriers (Balance Goal 1, PT) decreased strength and endurance  -ELLIOTT (r) LN (t) ELLIOTT (c)     Progress/Outcomes (Balance Goal 1, PT) goal ongoing  -ELLIOTT (r) LN (t) ELLIOTT (c)     Row Name 04/13/18 1455          Patient Education Goal (PT)    Activity (Patient Education Goal, PT) bed mobility, transfers, gait, safe use of RW, benefits of activity  -ELLIOTT (r) LN (t) ELLIOTT (c)     [place]/Cues/Accuracy (Memory Goal 2, PT) demonstrates adequately;verbalizes understanding  -ELLIOTT (r) LN (t) ELLIOTT (c)     Time Frame (Patient Education Goal, PT) long term goal (LTG);by discharge  -ELLIOTT (r) LN (t) ELLIOTT (c)     Barriers (Patient Education Goal, PT) impaired cognition and memory  -ELLIOTT (r) LN (t) ELLIOTT (c)     Progress/Outcome (Patient Education Goal, PT) goal ongoing  -ELLIOTT (r) LN (t) ELLIOTT (c)     Row Name 04/13/18 1456          Positioning and Restraints    Pre-Treatment Position in bed  -ELLIOTT (r) LN (t) ELLIOTT (c)     Post Treatment Position bed  -ELLIOTT (r) LN (t) ELLIOTT (c)     In Bed notified nsg;fowlers;call light within reach;encouraged to call for assist;exit alarm on;with family/caregiver;side rails up x2  -ELLIOTT (r) LN (t) ELLIOTT (c)     Row Name 04/13/18 1450          Living Environment    Home Accessibility stairs to enter home  -ELLIOTT (r) LN (t) ELLIOTT (c)       User Key  (r) = Recorded By, (t) = Taken By, (c) = Cosigned By    Initials Name Provider Type    ELLIOTT Priest, JETT DPT Physical Therapist    WHIT Ball RN Registered Nurse    LN  Pankaj Saucedo, PT Student PT Student          Physical Therapy Education     Title: PT OT SLP Therapies (Active)     Topic: Physical Therapy (Active)     Point: Mobility training (Active)    Learning Progress Summary     Learner Status Readiness Method Response Comment Documented by    Patient Active Acceptance E NR Educated on purpose and benefits of therapy; bed mobility, sit <-> stand with RW; weight shifting to clear foot to take a step; discussion with pt and spouse on benefits of continued therapy at SNF after DC for increased safety at home LN 04/13/18 1601    Family Active Acceptance E NR Educated on purpose and benefits of therapy; bed mobility, sit <-> stand with RW; weight shifting to clear foot to take a step; discussion with pt and spouse on benefits of continued therapy at SNF after DC for increased safety at home LN 04/13/18 1601          Point: Body mechanics (Active)    Learning Progress Summary     Learner Status Readiness Method Response Comment Documented by    Patient Active Acceptance E NR Educated on purpose and benefits of therapy; bed mobility, sit <-> stand with RW; weight shifting to clear foot to take a step; discussion with pt and spouse on benefits of continued therapy at SNF after DC for increased safety at home LN 04/13/18 1601    Family Active Acceptance E NR Educated on purpose and benefits of therapy; bed mobility, sit <-> stand with RW; weight shifting to clear foot to take a step; discussion with pt and spouse on benefits of continued therapy at SNF after DC for increased safety at home LN 04/13/18 1601          Point: Precautions (Active)    Learning Progress Summary     Learner Status Readiness Method Response Comment Documented by    Patient Active Acceptance E NR Educated on purpose and benefits of therapy; bed mobility, sit <-> stand with RW; weight shifting to clear foot to take a step; discussion with pt and spouse on benefits of continued therapy at SNF after DC for  increased safety at home LN 04/13/18 1601    Family Active Acceptance E NR Educated on purpose and benefits of therapy; bed mobility, sit <-> stand with RW; weight shifting to clear foot to take a step; discussion with pt and spouse on benefits of continued therapy at SNF after DC for increased safety at home LN 04/13/18 1601                      User Key     Initials Effective Dates Name Provider Type Discipline    LN 01/08/18 -  Pankaj Saucedo, PT Student PT Student PT                PT Recommendation and Plan  Anticipated Discharge Disposition (PT): skilled nursing facility (SNF) (Discussion with pt and spouse on benefits of rehab)  Planned Therapy Interventions (PT Eval): balance training, bed mobility training, gait training, home exercise program, patient/family education, strengthening, transfer training  Therapy Frequency (PT Clinical Impression): daily (1-2x/day)  Outcome Summary/Treatment Plan (PT)  Anticipated Discharge Disposition (PT): skilled nursing facility (SNF) (Discussion with pt and spouse on benefits of rehab)  Plan of Care Reviewed With: patient, spouse  Outcome Summary: PT eval complete. Pt presents with significant weakness, impaired balance, and decreased activity tolerance. Pt also has poor safety awareness and  memory secondary to dementia. Pt was mod  A x2 for bed mobility, and mod A x2 for sit <-> stand with RW. Pt needed significant assist, verbal and tactile cues for weight shifting and postural corrections in standing. Skilled therapy indicated to address pt's deficits to improve safety and independence with functional mobility. Pt is unsafe to return home at this time, recommend DC to SNF for continued therapy.           Outcome Measures     Row Name 04/13/18 1600 04/13/18 1451          How much help from another person do you currently need...    Turning from your back to your side while in flat bed without using bedrails?  -- 3  -ELLIOTT (r) LN (t) ELLIOTT (c)     Moving from lying on back to  sitting on the side of a flat bed without bedrails?  -- 2  -ELLIOTT (r) LN (t) ELLIOTT (c)     Moving to and from a bed to a chair (including a wheelchair)?  -- 2  -ELLIOTT (r) LN (t) ELLIOTT (c)     Standing up from a chair using your arms (e.g., wheelchair, bedside chair)?  -- 2  -ELLIOTT (r) LN (t) ELLIOTT (c)     Climbing 3-5 steps with a railing?  -- 1  -ELLIOTT (r) LN (t) ELLIOTT (c)     To walk in hospital room?  -- 2  -ELLIOTT (r) LN (t) ELLIOTT (c)     AM-PAC 6 Clicks Score  -- 12  -ELLIOTT (r) LN (t)        Functional Assessment    Outcome Measure Options (P)  AM-PAC 6 Clicks Daily Activity (OT)  -MM AM-PAC 6 Clicks Basic Mobility (PT)  -ELLIOTT (r) LN (t) ELLIOTT (c)       User Key  (r) = Recorded By, (t) = Taken By, (c) = Cosigned By    Initials Name Provider Type    ELLIOTT Priest, PT DPT Physical Therapist    MM Liu Gómez, OTR/L Occupational Therapist    LN Pankaj Saucedo, PT Student PT Student           Time Calculation:         PT Charges     Row Name 04/13/18 1604             Time Calculation    Start Time 1440  -ELLIOTT (r) LN (t) ELLIOTT (c)      Stop Time 1535  -ELLIOTT (r) LN (t) ELLIOTT (c)      Time Calculation (min) 55 min  -ELLIOTT (r) LN (t)      PT Received On 04/13/18  -ELLIOTT (r) LN (t) ELLIOTT (c)      PT Goal Re-Cert Due Date 04/23/18  -ELLIOTT (r) LN (t) ELLIOTT (c)        User Key  (r) = Recorded By, (t) = Taken By, (c) = Cosigned By    Initials Name Provider Type    ELLIOTT Priest, PT DPT Physical Therapist    LN Pankaj Saucedo, PT Student PT Student          Therapy Charges for Today     Code Description Service Date Service Provider Modifiers Qty    47952136555 HC PT EVAL MOD COMPLEXITY 4 4/13/2018 Pankaj Saucedo, PT Student GP 1          PT G-Codes  Outcome Measure Options: (P) AM-PAC 6 Clicks Daily Activity (OT)  Score: 12  Functional Limitation: Mobility: Walking and moving around  Mobility: Walking and Moving Around Current Status (): At least 60 percent but less than 80 percent impaired, limited or restricted  Mobility: Walking and Moving Around Goal Status (): At  least 40 percent but less than 60 percent impaired, limited or restricted      Pankaj Saucedo, PT Student  4/13/2018

## 2018-04-14 LAB
ALBUMIN SERPL-MCNC: 3.1 G/DL (ref 3.5–5)
ALBUMIN/GLOB SERPL: 1.1 G/DL (ref 1.1–2.5)
ALP SERPL-CCNC: 95 U/L (ref 24–120)
ALT SERPL W P-5'-P-CCNC: 36 U/L (ref 0–54)
ANION GAP SERPL CALCULATED.3IONS-SCNC: 11 MMOL/L (ref 4–13)
AST SERPL-CCNC: 35 U/L (ref 7–45)
BACTERIA SPEC RESP CULT: NORMAL
BACTERIA SPEC RESP CULT: NORMAL
BASOPHILS # BLD AUTO: 0.02 10*3/MM3 (ref 0–0.2)
BASOPHILS NFR BLD AUTO: 0.5 % (ref 0–2)
BILIRUB SERPL-MCNC: 0.5 MG/DL (ref 0.1–1)
BUN BLD-MCNC: 15 MG/DL (ref 5–21)
BUN/CREAT SERPL: 17.6 (ref 7–25)
CALCIUM SPEC-SCNC: 9 MG/DL (ref 8.4–10.4)
CHLORIDE SERPL-SCNC: 101 MMOL/L (ref 98–110)
CO2 SERPL-SCNC: 26 MMOL/L (ref 24–31)
CREAT BLD-MCNC: 0.85 MG/DL (ref 0.5–1.4)
DEPRECATED RDW RBC AUTO: 45.9 FL (ref 40–54)
EOSINOPHIL # BLD AUTO: 0.06 10*3/MM3 (ref 0–0.7)
EOSINOPHIL NFR BLD AUTO: 1.4 % (ref 0–4)
ERYTHROCYTE [DISTWIDTH] IN BLOOD BY AUTOMATED COUNT: 13.8 % (ref 12–15)
GFR SERPL CREATININE-BSD FRML MDRD: 87 ML/MIN/1.73
GLOBULIN UR ELPH-MCNC: 2.7 GM/DL
GLUCOSE BLD-MCNC: 252 MG/DL (ref 70–100)
GLUCOSE BLDC GLUCOMTR-MCNC: 187 MG/DL (ref 70–130)
GLUCOSE BLDC GLUCOMTR-MCNC: 271 MG/DL (ref 70–130)
GLUCOSE BLDC GLUCOMTR-MCNC: 350 MG/DL (ref 70–130)
GRAM STN SPEC: NORMAL
GRAM STN SPEC: NORMAL
HCT VFR BLD AUTO: 31.3 % (ref 40–52)
HGB BLD-MCNC: 10.2 G/DL (ref 14–18)
IMM GRANULOCYTES # BLD: 0.02 10*3/MM3 (ref 0–0.03)
IMM GRANULOCYTES NFR BLD: 0.5 % (ref 0–5)
LYMPHOCYTES # BLD AUTO: 0.6 10*3/MM3 (ref 0.72–4.86)
LYMPHOCYTES NFR BLD AUTO: 14 % (ref 15–45)
MCH RBC QN AUTO: 29.6 PG (ref 28–32)
MCHC RBC AUTO-ENTMCNC: 32.6 G/DL (ref 33–36)
MCV RBC AUTO: 90.7 FL (ref 82–95)
MONOCYTES # BLD AUTO: 0.4 10*3/MM3 (ref 0.19–1.3)
MONOCYTES NFR BLD AUTO: 9.3 % (ref 4–12)
NEUTROPHILS # BLD AUTO: 3.19 10*3/MM3 (ref 1.87–8.4)
NEUTROPHILS NFR BLD AUTO: 74.3 % (ref 39–78)
NRBC BLD MANUAL-RTO: 0 /100 WBC (ref 0–0)
PLATELET # BLD AUTO: 177 10*3/MM3 (ref 130–400)
PMV BLD AUTO: 10.8 FL (ref 6–12)
POTASSIUM BLD-SCNC: 3.5 MMOL/L (ref 3.5–5.3)
PROT SERPL-MCNC: 5.8 G/DL (ref 6.3–8.7)
RBC # BLD AUTO: 3.45 10*6/MM3 (ref 4.8–5.9)
SODIUM BLD-SCNC: 138 MMOL/L (ref 135–145)
WBC NRBC COR # BLD: 4.29 10*3/MM3 (ref 4.8–10.8)

## 2018-04-14 PROCEDURE — 85025 COMPLETE CBC W/AUTO DIFF WBC: CPT | Performed by: FAMILY MEDICINE

## 2018-04-14 PROCEDURE — 82962 GLUCOSE BLOOD TEST: CPT

## 2018-04-14 PROCEDURE — 94799 UNLISTED PULMONARY SVC/PX: CPT

## 2018-04-14 PROCEDURE — 80053 COMPREHEN METABOLIC PANEL: CPT | Performed by: FAMILY MEDICINE

## 2018-04-14 PROCEDURE — 63710000001 INSULIN LISPRO (HUMAN) PER 5 UNITS: Performed by: NURSE PRACTITIONER

## 2018-04-14 PROCEDURE — 25010000002 ENOXAPARIN PER 10 MG: Performed by: NURSE PRACTITIONER

## 2018-04-14 RX ORDER — AMOXICILLIN AND CLAVULANATE POTASSIUM 875; 125 MG/1; MG/1
1 TABLET, FILM COATED ORAL EVERY 12 HOURS SCHEDULED
Status: DISCONTINUED | OUTPATIENT
Start: 2018-04-14 | End: 2018-04-18 | Stop reason: HOSPADM

## 2018-04-14 RX ADMIN — AMOXICILLIN AND CLAVULANATE POTASSIUM 1 TABLET: 875; 125 TABLET, FILM COATED ORAL at 09:45

## 2018-04-14 RX ADMIN — INSULIN LISPRO 2 UNITS: 100 INJECTION, SOLUTION INTRAVENOUS; SUBCUTANEOUS at 09:45

## 2018-04-14 RX ADMIN — CLOPIDOGREL 75 MG: 75 TABLET, FILM COATED ORAL at 09:45

## 2018-04-14 RX ADMIN — AMOXICILLIN AND CLAVULANATE POTASSIUM 1 TABLET: 875; 125 TABLET, FILM COATED ORAL at 20:28

## 2018-04-14 RX ADMIN — MIDODRINE HYDROCHLORIDE 5 MG: 2.5 TABLET ORAL at 20:28

## 2018-04-14 RX ADMIN — INSULIN LISPRO 6 UNITS: 100 INJECTION, SOLUTION INTRAVENOUS; SUBCUTANEOUS at 13:18

## 2018-04-14 RX ADMIN — FOLIC ACID 2 MG: 1 TABLET ORAL at 09:44

## 2018-04-14 RX ADMIN — FERROUS SULFATE TAB 325 MG (65 MG ELEMENTAL FE) 325 MG: 325 (65 FE) TAB at 09:45

## 2018-04-14 RX ADMIN — INSULIN LISPRO 8 UNITS: 100 INJECTION, SOLUTION INTRAVENOUS; SUBCUTANEOUS at 20:29

## 2018-04-14 RX ADMIN — AMIODARONE HYDROCHLORIDE 200 MG: 200 TABLET ORAL at 09:44

## 2018-04-14 RX ADMIN — MIDODRINE HYDROCHLORIDE 5 MG: 2.5 TABLET ORAL at 16:03

## 2018-04-14 RX ADMIN — CARVEDILOL 6.25 MG: 6.25 TABLET, FILM COATED ORAL at 09:43

## 2018-04-14 RX ADMIN — PANTOPRAZOLE SODIUM 40 MG: 40 TABLET, DELAYED RELEASE ORAL at 09:45

## 2018-04-14 RX ADMIN — DESMOPRESSIN ACETATE 40 MG: 0.2 TABLET ORAL at 20:29

## 2018-04-14 RX ADMIN — ASPIRIN 81 MG: 81 TABLET ORAL at 09:43

## 2018-04-14 RX ADMIN — HYDROCODONE BITARTRATE AND ACETAMINOPHEN 1 TABLET: 5; 325 TABLET ORAL at 23:22

## 2018-04-14 RX ADMIN — IPRATROPIUM BROMIDE AND ALBUTEROL SULFATE 3 ML: 2.5; .5 SOLUTION RESPIRATORY (INHALATION) at 20:01

## 2018-04-14 RX ADMIN — MIDODRINE HYDROCHLORIDE 5 MG: 2.5 TABLET ORAL at 09:44

## 2018-04-14 RX ADMIN — IPRATROPIUM BROMIDE AND ALBUTEROL SULFATE 3 ML: 2.5; .5 SOLUTION RESPIRATORY (INHALATION) at 12:02

## 2018-04-14 RX ADMIN — IPRATROPIUM BROMIDE AND ALBUTEROL SULFATE 3 ML: 2.5; .5 SOLUTION RESPIRATORY (INHALATION) at 07:00

## 2018-04-14 RX ADMIN — INSULIN LISPRO 6 UNITS: 100 INJECTION, SOLUTION INTRAVENOUS; SUBCUTANEOUS at 18:37

## 2018-04-14 RX ADMIN — HYDROCODONE BITARTRATE AND ACETAMINOPHEN 1 TABLET: 5; 325 TABLET ORAL at 18:40

## 2018-04-14 RX ADMIN — CARVEDILOL 6.25 MG: 6.25 TABLET, FILM COATED ORAL at 20:29

## 2018-04-14 RX ADMIN — ENOXAPARIN SODIUM 40 MG: 40 INJECTION SUBCUTANEOUS at 18:37

## 2018-04-14 NOTE — PLAN OF CARE
Problem: Patient Care Overview  Goal: Plan of Care Review  Outcome: Ongoing (interventions implemented as appropriate)   04/14/18 1710   Coping/Psychosocial   Plan of Care Reviewed With patient;spouse   Plan of Care Review   Progress improving   OTHER   Outcome Summary Pt up to BS with assist X2. Takes meds whole in applesauce. No complains of pain. Will continue to monitor.      Goal: Individualization and Mutuality  Outcome: Ongoing (interventions implemented as appropriate)    Goal: Discharge Needs Assessment  Outcome: Ongoing (interventions implemented as appropriate)   04/11/18 1505 04/11/18 1703 04/11/18 1706   Discharge Needs Assessment   Readmission Within the Last 30 Days no previous admission in last 30 days --  --    Concerns to be Addressed --  --  --    Patient/Family Anticipates Transition to --  --  home with family  (family wants pt to go back home, not back to [facility] home)   Patient/Family Anticipated Services at Transition --  --  none   Transportation Concerns --  --  car, none   Transportation Anticipated --  --  family or friend will provide   Anticipated Changes Related to Illness inability to care for self --  --    Equipment Needed After Discharge none --  --    Outpatient/Agency/Support Group Needs homecare agency --  --    Discharge Facility/Level of Care Needs home with home health --  --    Disability   Equipment Currently Used at Home --  walker, standard;commode;shower chair;cane, straight --     04/13/18 4174   Discharge Needs Assessment   Readmission Within the Last 30 Days --    Concerns to be Addressed other (see comments)  (ss fu for d/c needs)   Patient/Family Anticipates Transition to --    Patient/Family Anticipated Services at Transition --    Transportation Concerns --    Transportation Anticipated --    Anticipated Changes Related to Illness --    Equipment Needed After Discharge --    Outpatient/Agency/Support Group Needs --    Discharge Facility/Level of Care Needs --     Disability   Equipment Currently Used at Home --      Goal: Interprofessional Rounds/Family Conf  Outcome: Ongoing (interventions implemented as appropriate)   04/14/18 1710   Interdisciplinary Rounds/Family Conf   Participants dietitian/nutrition services;family;nursing;patient;physical therapy;physician       Problem: Fluid Volume Deficit (Adult)  Goal: Optimal Fluid Balance  Outcome: Ongoing (interventions implemented as appropriate)   04/14/18 1710   Fluid Volume Deficit (Adult)   Optimal Fluid Balance making progress toward outcome       Problem: Wound (Includes Pressure Injury) (Adult)  Goal: Signs and Symptoms of Listed Potential Problems Will be Absent, Minimized or Managed (Wound)  Outcome: Ongoing (interventions implemented as appropriate)   04/14/18 1710   Goal/Outcome Evaluation   Problems Assessed (Wound) all   Problems Present (Wound) delayed wound healing       Problem: Skin Injury Risk (Adult)  Goal: Identify Related Risk Factors and Signs and Symptoms  Outcome: Ongoing (interventions implemented as appropriate)   04/14/18 1710   Skin Injury Risk (Adult)   Related Risk Factors (Skin Injury Risk) advanced age;cognitive impairment;hospitalization prolonged;nutritional deficiencies;tissue perfusion altered     Goal: Skin Health and Integrity  Outcome: Ongoing (interventions implemented as appropriate)   04/14/18 1710   Skin Injury Risk (Adult)   Skin Health and Integrity making progress toward outcome

## 2018-04-14 NOTE — PROGRESS NOTES
Continued Stay Note  [facility]     Patient Name: Delon Cid  MRN: [id number]  Today's Date: 4/14/2018    Admit Date: 4/11/2018          Discharge Plan     Row Name 04/14/18 1019       Plan    Plan HOLLY spoke to pt's spouse re: nhp.  She is still agreeable for pt to be placed in [place].  HOLLY Kerr has already sent referrals.  Pt's spouse is adamant that pt only goes short term and then returns back home.  She has requested that SW come talk to family when they arrive later today.  SW will meet with them when they arrive.  KRISTIN Ravi.    Patient/Family in Agreement with Plan yes              Discharge Codes    No documentation.           KRISTIN Ventura

## 2018-04-14 NOTE — PLAN OF CARE
Problem: Patient Care Overview  Goal: Discharge Needs Assessment  Outcome: Ongoing (interventions implemented as appropriate)      Problem: Fluid Volume Deficit (Adult)  Goal: Optimal Fluid Balance  Outcome: Ongoing (interventions implemented as appropriate)   04/14/18 0314   Fluid Volume Deficit (Adult)   Optimal Fluid Balance making progress toward outcome       Problem: Fall Risk (Adult)  Goal: Absence of Fall  Outcome: Outcome(s) achieved Date Met: 04/14/18 04/14/18 0314   Fall Risk (Adult)   Absence of Fall achieves outcome       Problem: Wound (Includes Pressure Injury) (Adult)  Goal: Signs and Symptoms of Listed Potential Problems Will be Absent, Minimized or Managed (Wound)  Outcome: Ongoing (interventions implemented as appropriate)   04/14/18 0314   Goal/Outcome Evaluation   Problems Assessed (Wound) delayed wound healing;situational response   Problems Present (Wound) delayed wound healing;situational response       Problem: Skin Injury Risk (Adult)  Goal: Identify Related Risk Factors and Signs and Symptoms  Outcome: Ongoing (interventions implemented as appropriate)   04/14/18 0314   Skin Injury Risk (Adult)   Related Risk Factors (Skin Injury Risk) advanced age;cognitive impairment;hospitalization prolonged;mobility impaired;nutritional deficiencies;tissue perfusion altered     Goal: Skin Health and Integrity  Outcome: Ongoing (interventions implemented as appropriate)   04/14/18 0314   Skin Injury Risk (Adult)   Skin Health and Integrity making progress toward outcome

## 2018-04-14 NOTE — PROGRESS NOTES
[facility] Medicine Services  INPATIENT PROGRESS NOTE    Length of Stay: 3  Date of Admission: 4/11/2018  Primary Care Physician: Juno Moscoso MD    Subjective   Chief Complaint: Confusion  HPI   Doing ok.  Breathing better.  Afebrile.  He is still confused.  His wife is concerned that her kids are trying to put him in a nursing home permanently.  I actually think this would be good for him and her.  She says she is open to short term placement, but she doesn't want him to go to one forever.          Review of Systems   Unable to perform ROS: Dementia      All pertinent negatives and positives are as above. All other systems have been reviewed and are negative unless otherwise stated.     Objective    Temp:  [97 °F (36.1 °C)-99.3 °F (37.4 °C)] 99.3 °F (37.4 °C)  Heart Rate:  [64-76] 64  Resp:  [16-18] 16  BP: ()/(44-90) 151/69  Physical Exam   Constitutional: He appears well-developed and well-nourished.   HENT:   Head: Normocephalic and atraumatic.   Right Ear: External ear normal.   Left Ear: External ear normal.   Nose: Nose normal.   Mouth/Throat: Oropharynx is clear and moist.   Eyes: Conjunctivae and EOM are normal. Pupils are equal, round, and reactive to light. Right eye exhibits no discharge. Left eye exhibits no discharge. No scleral icterus.   Neck: Normal range of motion. Neck supple. No tracheal deviation present. No thyromegaly present.   Cardiovascular: Normal rate, regular rhythm, normal heart sounds and intact distal pulses.  Exam reveals no gallop and no friction rub.    No murmur heard.  Pulmonary/Chest: Effort normal. No stridor. No respiratory distress. He has decreased breath sounds. He has no wheezes. He has rhonchi. He has no rales. He exhibits no tenderness.   Abdominal: Soft. Bowel sounds are normal. He exhibits no distension and no mass. There is no tenderness. There is no rebound and no guarding. No hernia.   Musculoskeletal: Normal range of  motion. He exhibits no edema or deformity.   Lymphadenopathy:     He has no cervical adenopathy.   Neurological: He is alert. He has normal reflexes. He is disoriented. He displays normal reflexes. No cranial nerve deficit. He exhibits normal muscle tone. Coordination normal.   Skin: Skin is warm and dry. No rash noted. No erythema. No pallor.   Psychiatric: He has a normal mood and affect. His behavior is normal. Cognition and memory are impaired.   Vitals reviewed.        Results Review:  I have reviewed the labs, radiology results, and diagnostic studies.    Laboratory Data:     Results from last 7 days  Lab Units 04/13/18  1116 04/12/18  0409 04/11/18  1239   WBC 10*3/mm3 6.63 7.03 10.76   HEMOGLOBIN g/dL 9.7* 10.0* 10.6*   HEMATOCRIT % 29.6* 31.6* 33.0*   PLATELETS 10*3/mm3 169 179 210          Results from last 7 days  Lab Units 04/13/18  1116 04/12/18  0409 04/11/18  1330   SODIUM mmol/L 136 136 135   POTASSIUM mmol/L 3.6 4.1 4.3   CHLORIDE mmol/L 100 100 98   CO2 mmol/L 24.0 26.0 31.0   BUN mg/dL 18 26* 27*   CREATININE mg/dL 0.91 1.09 1.15   CALCIUM mg/dL 9.0 9.1 9.2   BILIRUBIN mg/dL 0.6  --  0.7   ALK PHOS U/L 99  --  70   ALT (SGPT) U/L 35  --  17   AST (SGOT) U/L 41  --  25   GLUCOSE mg/dL 207* 139* 113*       Culture Data:   Blood Culture   Date Value Ref Range Status   04/11/2018 No growth at 2 days  Preliminary   04/11/2018 No growth at 2 days  Preliminary     Respiratory Culture   Date Value Ref Range Status   04/12/2018 Light growth (2+) Normal Respiratory An  Final       Radiology Data:   Imaging Results (last 24 hours)     ** No results found for the last 24 hours. **          I have reviewed the patient current medications.     Assessment/Plan      1.  HCAP  -Vanc/Zosyn  -Blood/sputum cultures  -Nebs     2.  Chronic Systolic CHF  -Coreg     3.  V-Tach  -Amiodarone  -Coreg     4.  HLD  -Lipitor     5.  Insulin Dependent T2DM  -SSI     6.  Dementia with behavioral distubrance  -Seroquel  QHS        7.  Dehydration  -IVF     8.  HLD  -Lipitor     9.  CAD  -Lipitor  -Coreg  -Asa  -Plavix     10.  CVD   -Lipitor  -Asa  -Plavix     11.  Essential HTN  -Coreg        12.  Chronic Anemia  -monitor H&H     13.  GERD  -Protonix              Discharge Planning: I expect the patient to be discharged to NH in 2-3 days    Ishaan Gong MD   04/14/18   10:01 AM

## 2018-04-15 LAB
ALBUMIN SERPL-MCNC: 3 G/DL (ref 3.5–5)
ALBUMIN/GLOB SERPL: 1.1 G/DL (ref 1.1–2.5)
ALP SERPL-CCNC: 86 U/L (ref 24–120)
ALT SERPL W P-5'-P-CCNC: 32 U/L (ref 0–54)
ANION GAP SERPL CALCULATED.3IONS-SCNC: 8 MMOL/L (ref 4–13)
AST SERPL-CCNC: 23 U/L (ref 7–45)
BASOPHILS # BLD AUTO: 0.02 10*3/MM3 (ref 0–0.2)
BASOPHILS NFR BLD AUTO: 0.5 % (ref 0–2)
BILIRUB SERPL-MCNC: 0.3 MG/DL (ref 0.1–1)
BUN BLD-MCNC: 14 MG/DL (ref 5–21)
BUN/CREAT SERPL: 16.1 (ref 7–25)
CALCIUM SPEC-SCNC: 8.9 MG/DL (ref 8.4–10.4)
CHLORIDE SERPL-SCNC: 103 MMOL/L (ref 98–110)
CO2 SERPL-SCNC: 28 MMOL/L (ref 24–31)
CREAT BLD-MCNC: 0.87 MG/DL (ref 0.5–1.4)
DEPRECATED RDW RBC AUTO: 46.4 FL (ref 40–54)
EOSINOPHIL # BLD AUTO: 0.08 10*3/MM3 (ref 0–0.7)
EOSINOPHIL NFR BLD AUTO: 2.2 % (ref 0–4)
ERYTHROCYTE [DISTWIDTH] IN BLOOD BY AUTOMATED COUNT: 13.9 % (ref 12–15)
GFR SERPL CREATININE-BSD FRML MDRD: 84 ML/MIN/1.73
GLOBULIN UR ELPH-MCNC: 2.7 GM/DL
GLUCOSE BLD-MCNC: 161 MG/DL (ref 70–100)
GLUCOSE BLDC GLUCOMTR-MCNC: 168 MG/DL (ref 70–130)
GLUCOSE BLDC GLUCOMTR-MCNC: 261 MG/DL (ref 70–130)
GLUCOSE BLDC GLUCOMTR-MCNC: 264 MG/DL (ref 70–130)
GLUCOSE BLDC GLUCOMTR-MCNC: 303 MG/DL (ref 70–130)
HCT VFR BLD AUTO: 31.6 % (ref 40–52)
HGB BLD-MCNC: 10.2 G/DL (ref 14–18)
IMM GRANULOCYTES # BLD: 0.01 10*3/MM3 (ref 0–0.03)
IMM GRANULOCYTES NFR BLD: 0.3 % (ref 0–5)
LYMPHOCYTES # BLD AUTO: 1.1 10*3/MM3 (ref 0.72–4.86)
LYMPHOCYTES NFR BLD AUTO: 29.7 % (ref 15–45)
MCH RBC QN AUTO: 29.4 PG (ref 28–32)
MCHC RBC AUTO-ENTMCNC: 32.3 G/DL (ref 33–36)
MCV RBC AUTO: 91.1 FL (ref 82–95)
MONOCYTES # BLD AUTO: 0.44 10*3/MM3 (ref 0.19–1.3)
MONOCYTES NFR BLD AUTO: 11.9 % (ref 4–12)
NEUTROPHILS # BLD AUTO: 2.05 10*3/MM3 (ref 1.87–8.4)
NEUTROPHILS NFR BLD AUTO: 55.4 % (ref 39–78)
NRBC BLD MANUAL-RTO: 0 /100 WBC (ref 0–0)
PLATELET # BLD AUTO: 192 10*3/MM3 (ref 130–400)
PMV BLD AUTO: 11 FL (ref 6–12)
POTASSIUM BLD-SCNC: 3.5 MMOL/L (ref 3.5–5.3)
PROT SERPL-MCNC: 5.7 G/DL (ref 6.3–8.7)
RBC # BLD AUTO: 3.47 10*6/MM3 (ref 4.8–5.9)
SODIUM BLD-SCNC: 139 MMOL/L (ref 135–145)
WBC NRBC COR # BLD: 3.7 10*3/MM3 (ref 4.8–10.8)

## 2018-04-15 PROCEDURE — 94799 UNLISTED PULMONARY SVC/PX: CPT

## 2018-04-15 PROCEDURE — 82962 GLUCOSE BLOOD TEST: CPT

## 2018-04-15 PROCEDURE — 63710000001 INSULIN LISPRO (HUMAN) PER 5 UNITS: Performed by: NURSE PRACTITIONER

## 2018-04-15 PROCEDURE — 85025 COMPLETE CBC W/AUTO DIFF WBC: CPT | Performed by: FAMILY MEDICINE

## 2018-04-15 PROCEDURE — 97110 THERAPEUTIC EXERCISES: CPT

## 2018-04-15 PROCEDURE — 25010000002 ENOXAPARIN PER 10 MG: Performed by: NURSE PRACTITIONER

## 2018-04-15 PROCEDURE — 80053 COMPREHEN METABOLIC PANEL: CPT | Performed by: FAMILY MEDICINE

## 2018-04-15 PROCEDURE — 97116 GAIT TRAINING THERAPY: CPT

## 2018-04-15 RX ORDER — LEVOTHYROXINE SODIUM 0.07 MG/1
75 TABLET ORAL
Status: DISCONTINUED | OUTPATIENT
Start: 2018-04-16 | End: 2018-04-18 | Stop reason: HOSPADM

## 2018-04-15 RX ORDER — GUAIFENESIN 600 MG/1
1200 TABLET, EXTENDED RELEASE ORAL EVERY 12 HOURS SCHEDULED
Status: DISCONTINUED | OUTPATIENT
Start: 2018-04-15 | End: 2018-04-18 | Stop reason: HOSPADM

## 2018-04-15 RX ADMIN — HYDROCODONE BITARTRATE AND ACETAMINOPHEN 1 TABLET: 5; 325 TABLET ORAL at 08:37

## 2018-04-15 RX ADMIN — MIDODRINE HYDROCHLORIDE 5 MG: 2.5 TABLET ORAL at 20:30

## 2018-04-15 RX ADMIN — AMOXICILLIN AND CLAVULANATE POTASSIUM 1 TABLET: 875; 125 TABLET, FILM COATED ORAL at 08:39

## 2018-04-15 RX ADMIN — INSULIN LISPRO 2 UNITS: 100 INJECTION, SOLUTION INTRAVENOUS; SUBCUTANEOUS at 08:39

## 2018-04-15 RX ADMIN — FOLIC ACID 2 MG: 1 TABLET ORAL at 08:38

## 2018-04-15 RX ADMIN — INSULIN LISPRO 7 UNITS: 100 INJECTION, SOLUTION INTRAVENOUS; SUBCUTANEOUS at 20:29

## 2018-04-15 RX ADMIN — AMIODARONE HYDROCHLORIDE 200 MG: 200 TABLET ORAL at 08:38

## 2018-04-15 RX ADMIN — GUAIFENESIN 1200 MG: 600 TABLET, EXTENDED RELEASE ORAL at 20:30

## 2018-04-15 RX ADMIN — IPRATROPIUM BROMIDE AND ALBUTEROL SULFATE 3 ML: 2.5; .5 SOLUTION RESPIRATORY (INHALATION) at 06:38

## 2018-04-15 RX ADMIN — INSULIN LISPRO 6 UNITS: 100 INJECTION, SOLUTION INTRAVENOUS; SUBCUTANEOUS at 12:00

## 2018-04-15 RX ADMIN — CLOPIDOGREL 75 MG: 75 TABLET, FILM COATED ORAL at 08:38

## 2018-04-15 RX ADMIN — HYDROCODONE BITARTRATE AND ACETAMINOPHEN 1 TABLET: 5; 325 TABLET ORAL at 20:30

## 2018-04-15 RX ADMIN — INSULIN LISPRO 6 UNITS: 100 INJECTION, SOLUTION INTRAVENOUS; SUBCUTANEOUS at 17:25

## 2018-04-15 RX ADMIN — DESMOPRESSIN ACETATE 40 MG: 0.2 TABLET ORAL at 20:30

## 2018-04-15 RX ADMIN — ASPIRIN 81 MG: 81 TABLET ORAL at 08:38

## 2018-04-15 RX ADMIN — MIDODRINE HYDROCHLORIDE 5 MG: 2.5 TABLET ORAL at 08:38

## 2018-04-15 RX ADMIN — IPRATROPIUM BROMIDE AND ALBUTEROL SULFATE 3 ML: 2.5; .5 SOLUTION RESPIRATORY (INHALATION) at 14:32

## 2018-04-15 RX ADMIN — ENOXAPARIN SODIUM 40 MG: 40 INJECTION SUBCUTANEOUS at 17:26

## 2018-04-15 RX ADMIN — CARVEDILOL 6.25 MG: 6.25 TABLET, FILM COATED ORAL at 20:30

## 2018-04-15 RX ADMIN — IPRATROPIUM BROMIDE AND ALBUTEROL SULFATE 3 ML: 2.5; .5 SOLUTION RESPIRATORY (INHALATION) at 20:10

## 2018-04-15 RX ADMIN — MIDODRINE HYDROCHLORIDE 5 MG: 2.5 TABLET ORAL at 17:25

## 2018-04-15 RX ADMIN — GUAIFENESIN 1200 MG: 600 TABLET, EXTENDED RELEASE ORAL at 11:59

## 2018-04-15 RX ADMIN — AMOXICILLIN AND CLAVULANATE POTASSIUM 1 TABLET: 875; 125 TABLET, FILM COATED ORAL at 20:30

## 2018-04-15 RX ADMIN — FERROUS SULFATE TAB 325 MG (65 MG ELEMENTAL FE) 325 MG: 325 (65 FE) TAB at 08:39

## 2018-04-15 RX ADMIN — CARVEDILOL 6.25 MG: 6.25 TABLET, FILM COATED ORAL at 08:38

## 2018-04-15 NOTE — PLAN OF CARE
Problem: Patient Care Overview  Goal: Plan of Care Review  Outcome: Ongoing (interventions implemented as appropriate)   04/15/18 5087   Coping/Psychosocial   Plan of Care Reviewed With patient;spouse   Plan of Care Review   Progress no change   OTHER   Outcome Summary VSS. Pt c/o chronic back pain, medicated with PRN med with relief. Sats WNL on RA. Labs pending for thid AM. Pt is not on tele. Will cont to monitor. Plans for rehab at d/c.        Problem: Fluid Volume Deficit (Adult)  Goal: Optimal Fluid Balance  Outcome: Ongoing (interventions implemented as appropriate)      Problem: Wound (Includes Pressure Injury) (Adult)  Goal: Signs and Symptoms of Listed Potential Problems Will be Absent, Minimized or Managed (Wound)  Outcome: Ongoing (interventions implemented as appropriate)      Problem: Skin Injury Risk (Adult)  Goal: Identify Related Risk Factors and Signs and Symptoms  Outcome: Ongoing (interventions implemented as appropriate)    Goal: Skin Health and Integrity  Outcome: Ongoing (interventions implemented as appropriate)

## 2018-04-15 NOTE — PLAN OF CARE
Problem: Patient Care Overview  Goal: Plan of Care Review  Outcome: Ongoing (interventions implemented as appropriate)   04/15/18 2887   Coping/Psychosocial   Plan of Care Reviewed With patient;spouse   Plan of Care Review   Progress improving   OTHER   Outcome Summary Pt c/o no pain. Wife present at bedside. VSS. Increasingly more productive cough than previously assessed. Will continue to monitor.      Goal: Individualization and Mutuality  Outcome: Ongoing (interventions implemented as appropriate)    Goal: Discharge Needs Assessment  Outcome: Ongoing (interventions implemented as appropriate)   04/11/18 1505 04/11/18 1703 04/11/18 1706   Discharge Needs Assessment   Readmission Within the Last 30 Days no previous admission in last 30 days --  --    Concerns to be Addressed --  --  --    Patient/Family Anticipates Transition to --  --  home with family  (family wants pt to go back home, not back to [facility] home)   Patient/Family Anticipated Services at Transition --  --  none   Transportation Concerns --  --  car, none   Transportation Anticipated --  --  family or friend will provide   Anticipated Changes Related to Illness inability to care for self --  --    Equipment Needed After Discharge none --  --    Outpatient/Agency/Support Group Needs homecare agency --  --    Discharge Facility/Level of Care Needs home with home health --  --    Disability   Equipment Currently Used at Home --  walker, standard;commode;shower chair;cane, straight --     04/13/18 4740   Discharge Needs Assessment   Readmission Within the Last 30 Days --    Concerns to be Addressed other (see comments)  (ss fu for d/c needs)   Patient/Family Anticipates Transition to --    Patient/Family Anticipated Services at Transition --    Transportation Concerns --    Transportation Anticipated --    Anticipated Changes Related to Illness --    Equipment Needed After Discharge --    Outpatient/Agency/Support Group Needs --    Discharge  Facility/Level of Care Needs --    Disability   Equipment Currently Used at Home --      Goal: Interprofessional Rounds/Family Conf  Outcome: Ongoing (interventions implemented as appropriate)   04/14/18 1710   Interdisciplinary Rounds/Family Conf   Participants dietitian/nutrition services;family;nursing;patient;physical therapy;physician       Problem: Fluid Volume Deficit (Adult)  Goal: Optimal Fluid Balance  Outcome: Ongoing (interventions implemented as appropriate)   04/15/18 1557   Fluid Volume Deficit (Adult)   Optimal Fluid Balance making progress toward outcome       Problem: Wound (Includes Pressure Injury) (Adult)  Goal: Signs and Symptoms of Listed Potential Problems Will be Absent, Minimized or Managed (Wound)  Outcome: Ongoing (interventions implemented as appropriate)   04/15/18 1557   Goal/Outcome Evaluation   Problems Assessed (Wound) all   Problems Present (Wound) delayed wound healing       Problem: Skin Injury Risk (Adult)  Goal: Identify Related Risk Factors and Signs and Symptoms  Outcome: Ongoing (interventions implemented as appropriate)   04/15/18 1557   Skin Injury Risk (Adult)   Related Risk Factors (Skin Injury Risk) advanced age;cognitive impairment;hospitalization prolonged;mobility impaired;tissue perfusion altered     Goal: Skin Health and Integrity  Outcome: Ongoing (interventions implemented as appropriate)   04/15/18 1557   Skin Injury Risk (Adult)   Skin Health and Integrity making progress toward outcome

## 2018-04-15 NOTE — PROGRESS NOTES
[facility] Medicine Services  INPATIENT PROGRESS NOTE    Length of Stay: 4  Date of Admission: 4/11/2018  Primary Care Physician: Juno Moscoso MD    Subjective   Chief Complaint: SOA  HPI   Doing ok  He feels less soa, however he feels like he needs to cough mucus up.  He feels better overall  No fever  No chills        Review of Systems   Unable to perform ROS: Dementia      All pertinent negatives and positives are as above. All other systems have been reviewed and are negative unless otherwise stated.     Objective    Temp:  [97.6 °F (36.4 °C)-98.5 °F (36.9 °C)] 98.5 °F (36.9 °C)  Heart Rate:  [61-76] 61  Resp:  [17-18] 18  BP: (109-154)/(57-74) 110/57  Physical Exam   Constitutional: He appears well-developed and well-nourished.   HENT:   Head: Normocephalic and atraumatic.   Right Ear: External ear normal.   Left Ear: External ear normal.   Nose: Nose normal.   Mouth/Throat: Oropharynx is clear and moist.   Eyes: Conjunctivae and EOM are normal. Pupils are equal, round, and reactive to light. Right eye exhibits no discharge. Left eye exhibits no discharge. No scleral icterus.   Neck: Normal range of motion. Neck supple. No tracheal deviation present. No thyromegaly present.   Cardiovascular: Normal rate, regular rhythm, normal heart sounds and intact distal pulses.  Exam reveals no gallop and no friction rub.    No murmur heard.  Pulmonary/Chest: Effort normal. No stridor. No respiratory distress. He has decreased breath sounds. He has no wheezes. He has rhonchi. He has no rales. He exhibits no tenderness.   Abdominal: Soft. Bowel sounds are normal. He exhibits no distension and no mass. There is no tenderness. There is no rebound and no guarding. No hernia.   Musculoskeletal: Normal range of motion. He exhibits no edema or deformity.   Lymphadenopathy:     He has no cervical adenopathy.   Neurological: He is alert. He has normal reflexes. He is disoriented. He displays  normal reflexes. No cranial nerve deficit. He exhibits normal muscle tone. Coordination normal.   Skin: Skin is warm and dry. No rash noted. No erythema. No pallor.   Psychiatric: He has a normal mood and affect. His behavior is normal. Judgment and thought content normal. Cognition and memory are impaired.   Vitals reviewed.        Results Review:  I have reviewed the labs, radiology results, and diagnostic studies.    Laboratory Data:     Results from last 7 days  Lab Units 04/15/18  0649 04/14/18  1027 04/13/18  1116   WBC 10*3/mm3 3.70* 4.29* 6.63   HEMOGLOBIN g/dL 10.2* 10.2* 9.7*   HEMATOCRIT % 31.6* 31.3* 29.6*   PLATELETS 10*3/mm3 192 177 169          Results from last 7 days  Lab Units 04/15/18  0649 04/14/18  1027 04/13/18  1116   SODIUM mmol/L 139 138 136   POTASSIUM mmol/L 3.5 3.5 3.6   CHLORIDE mmol/L 103 101 100   CO2 mmol/L 28.0 26.0 24.0   BUN mg/dL 14 15 18   CREATININE mg/dL 0.87 0.85 0.91   CALCIUM mg/dL 8.9 9.0 9.0   BILIRUBIN mg/dL 0.3 0.5 0.6   ALK PHOS U/L 86 95 99   ALT (SGPT) U/L 32 36 35   AST (SGOT) U/L 23 35 41   GLUCOSE mg/dL 161* 252* 207*       Culture Data:   Blood Culture   Date Value Ref Range Status   04/11/2018 No growth at 3 days  Preliminary   04/11/2018 No growth at 3 days  Preliminary     Respiratory Culture   Date Value Ref Range Status   04/12/2018 Light growth (2+) Normal Respiratory An  Final       Radiology Data:   Imaging Results (last 24 hours)     ** No results found for the last 24 hours. **          I have reviewed the patient current medications.     Assessment/Plan   1.  HCAP  -PO abx  -Blood/sputum cultures  -Nebs     2.  Chronic Systolic CHF  -Coreg     3.  V-Tach  -Amiodarone  -Coreg     4.  HLD  -Lipitor     5.  Insulin Dependent T2DM  -SSI     6.  Dementia with behavioral distubrance  -Seroquel QHS        7.  Dehydration  -IVF     8.  HLD  -Lipitor     9.  CAD  -Lipitor  -Coreg  -Asa  -Plavix     10.  CVD   -Lipitor  -Asa  -Plavix     11.  Essential  HTN  -Coreg        12.  Chronic Anemia  -monitor H&H     13.  GERD  -Protonix                Discharge Planning: I expect the patient to be discharged to NH in 1-2 days    Ishaan Gong MD   04/15/18   9:49 AM

## 2018-04-15 NOTE — PROGRESS NOTES
Continued Stay Note   [place]     Patient Name: Delon Cid  MRN: [id number]  Today's Date: 4/15/2018    Admit Date: 4/11/2018          Discharge Plan     Row Name 04/15/18 1454       Plan    Plan Referral that spouse states she hasn't spoken to HOLLY and is not sure what the dc status is.  SW spoke to spouse yesterday and granddaughter today.  They are both aware that the business offices at the [facility] are closed on the weekend and we won't receive bed offers until Monday.  Both have already been informed of this infomation, there is nothing else to share with the family.  SW has let both granddaughter and spouse know that as soon as we hear back on Monday, they will BOTH be called.  KRISTIN Ravi.      Patient/Family in Agreement with Plan yes              Discharge Codes    No documentation.           KRISTIN Ventura

## 2018-04-15 NOTE — PLAN OF CARE
Problem: Patient Care Overview  Goal: Plan of Care Review  Outcome: Ongoing (interventions implemented as appropriate)   04/15/18 1034   Coping/Psychosocial   Plan of Care Reviewed With patient   Plan of Care Review   Progress improving   OTHER   Outcome Summary Patient's mobility has improved since his last session. He completed bed mobility with CGA/min A x 2 and completed sit to stands with min A x2. He ambulated 10' x 2 with roling walker and CGA x2 with one sittng rest. He does have severe forward flexed posture at his cervical spine. He continues to be at a higher risk of falls. We will continue to work on mobility, posture, and safety.

## 2018-04-15 NOTE — PROGRESS NOTES
Continued Stay Note  [facility]     Patient Name: Delon Cdi  MRN: [id number]  Today's Date: 4/15/2018    Admit Date: 4/11/2018          Discharge Plan     Row Name 04/15/18 1008       Plan    Plan SW spoke to granddaughter, Radha.  She was inquiring about dc plan.  SW explained that we are still waiting on bed offers from snf.  She wants to be called when we receive bed offers [phone].  She is also requesting a private room at snf.  KRISTIN Ravi.    Patient/Family in Agreement with Plan yes              Discharge Codes    No documentation.           KRISTIN Ventura

## 2018-04-16 LAB
ALBUMIN SERPL-MCNC: 3.2 G/DL (ref 3.5–5)
ALBUMIN/GLOB SERPL: 1.1 G/DL (ref 1.1–2.5)
ALP SERPL-CCNC: 83 U/L (ref 24–120)
ALT SERPL W P-5'-P-CCNC: 33 U/L (ref 0–54)
ANION GAP SERPL CALCULATED.3IONS-SCNC: 8 MMOL/L (ref 4–13)
AST SERPL-CCNC: 37 U/L (ref 7–45)
BACTERIA SPEC AEROBE CULT: NORMAL
BACTERIA SPEC AEROBE CULT: NORMAL
BASOPHILS # BLD AUTO: 0.02 10*3/MM3 (ref 0–0.2)
BASOPHILS NFR BLD AUTO: 0.5 % (ref 0–2)
BILIRUB SERPL-MCNC: 0.5 MG/DL (ref 0.1–1)
BUN BLD-MCNC: 11 MG/DL (ref 5–21)
BUN/CREAT SERPL: 12.2 (ref 7–25)
CALCIUM SPEC-SCNC: 9 MG/DL (ref 8.4–10.4)
CHLORIDE SERPL-SCNC: 101 MMOL/L (ref 98–110)
CO2 SERPL-SCNC: 30 MMOL/L (ref 24–31)
CREAT BLD-MCNC: 0.9 MG/DL (ref 0.5–1.4)
DEPRECATED RDW RBC AUTO: 46.4 FL (ref 40–54)
EOSINOPHIL # BLD AUTO: 0.06 10*3/MM3 (ref 0–0.7)
EOSINOPHIL NFR BLD AUTO: 1.4 % (ref 0–4)
ERYTHROCYTE [DISTWIDTH] IN BLOOD BY AUTOMATED COUNT: 14 % (ref 12–15)
GFR SERPL CREATININE-BSD FRML MDRD: 81 ML/MIN/1.73
GLOBULIN UR ELPH-MCNC: 2.8 GM/DL
GLUCOSE BLD-MCNC: 85 MG/DL (ref 70–100)
GLUCOSE BLDC GLUCOMTR-MCNC: 172 MG/DL (ref 70–130)
GLUCOSE BLDC GLUCOMTR-MCNC: 293 MG/DL (ref 70–130)
GLUCOSE BLDC GLUCOMTR-MCNC: 351 MG/DL (ref 70–130)
GLUCOSE BLDC GLUCOMTR-MCNC: 94 MG/DL (ref 70–130)
HCT VFR BLD AUTO: 32.3 % (ref 40–52)
HGB BLD-MCNC: 10.4 G/DL (ref 14–18)
IMM GRANULOCYTES # BLD: 0.02 10*3/MM3 (ref 0–0.03)
IMM GRANULOCYTES NFR BLD: 0.5 % (ref 0–5)
LYMPHOCYTES # BLD AUTO: 1.04 10*3/MM3 (ref 0.72–4.86)
LYMPHOCYTES NFR BLD AUTO: 24.9 % (ref 15–45)
MCH RBC QN AUTO: 29.5 PG (ref 28–32)
MCHC RBC AUTO-ENTMCNC: 32.2 G/DL (ref 33–36)
MCV RBC AUTO: 91.5 FL (ref 82–95)
MONOCYTES # BLD AUTO: 0.45 10*3/MM3 (ref 0.19–1.3)
MONOCYTES NFR BLD AUTO: 10.8 % (ref 4–12)
NEUTROPHILS # BLD AUTO: 2.58 10*3/MM3 (ref 1.87–8.4)
NEUTROPHILS NFR BLD AUTO: 61.9 % (ref 39–78)
NRBC BLD MANUAL-RTO: 0 /100 WBC (ref 0–0)
PLATELET # BLD AUTO: 186 10*3/MM3 (ref 130–400)
PMV BLD AUTO: 10.6 FL (ref 6–12)
POTASSIUM BLD-SCNC: 3.7 MMOL/L (ref 3.5–5.3)
PROT SERPL-MCNC: 6 G/DL (ref 6.3–8.7)
RBC # BLD AUTO: 3.53 10*6/MM3 (ref 4.8–5.9)
SODIUM BLD-SCNC: 139 MMOL/L (ref 135–145)
WBC NRBC COR # BLD: 4.17 10*3/MM3 (ref 4.8–10.8)

## 2018-04-16 PROCEDURE — 94760 N-INVAS EAR/PLS OXIMETRY 1: CPT

## 2018-04-16 PROCEDURE — 63710000001 INSULIN LISPRO (HUMAN) PER 5 UNITS: Performed by: NURSE PRACTITIONER

## 2018-04-16 PROCEDURE — 82962 GLUCOSE BLOOD TEST: CPT

## 2018-04-16 PROCEDURE — 94799 UNLISTED PULMONARY SVC/PX: CPT

## 2018-04-16 PROCEDURE — 97535 SELF CARE MNGMENT TRAINING: CPT

## 2018-04-16 PROCEDURE — 85025 COMPLETE CBC W/AUTO DIFF WBC: CPT | Performed by: FAMILY MEDICINE

## 2018-04-16 PROCEDURE — 25010000002 ENOXAPARIN PER 10 MG: Performed by: NURSE PRACTITIONER

## 2018-04-16 PROCEDURE — 97116 GAIT TRAINING THERAPY: CPT

## 2018-04-16 PROCEDURE — 80053 COMPREHEN METABOLIC PANEL: CPT | Performed by: FAMILY MEDICINE

## 2018-04-16 PROCEDURE — 92526 ORAL FUNCTION THERAPY: CPT

## 2018-04-16 PROCEDURE — 97110 THERAPEUTIC EXERCISES: CPT

## 2018-04-16 PROCEDURE — 97530 THERAPEUTIC ACTIVITIES: CPT

## 2018-04-16 PROCEDURE — 99233 SBSQ HOSP IP/OBS HIGH 50: CPT | Performed by: INTERNAL MEDICINE

## 2018-04-16 RX ADMIN — FOLIC ACID 2 MG: 1 TABLET ORAL at 09:37

## 2018-04-16 RX ADMIN — GUAIFENESIN 1200 MG: 600 TABLET, EXTENDED RELEASE ORAL at 21:40

## 2018-04-16 RX ADMIN — INSULIN LISPRO 8 UNITS: 100 INJECTION, SOLUTION INTRAVENOUS; SUBCUTANEOUS at 17:07

## 2018-04-16 RX ADMIN — CLOPIDOGREL 75 MG: 75 TABLET, FILM COATED ORAL at 09:37

## 2018-04-16 RX ADMIN — DESMOPRESSIN ACETATE 40 MG: 0.2 TABLET ORAL at 21:57

## 2018-04-16 RX ADMIN — INSULIN LISPRO 2 UNITS: 100 INJECTION, SOLUTION INTRAVENOUS; SUBCUTANEOUS at 12:15

## 2018-04-16 RX ADMIN — GUAIFENESIN 1200 MG: 600 TABLET, EXTENDED RELEASE ORAL at 09:37

## 2018-04-16 RX ADMIN — ASPIRIN 81 MG: 81 TABLET ORAL at 09:38

## 2018-04-16 RX ADMIN — PANTOPRAZOLE SODIUM 40 MG: 40 TABLET, DELAYED RELEASE ORAL at 09:37

## 2018-04-16 RX ADMIN — CARVEDILOL 6.25 MG: 6.25 TABLET, FILM COATED ORAL at 09:38

## 2018-04-16 RX ADMIN — INSULIN LISPRO 6 UNITS: 100 INJECTION, SOLUTION INTRAVENOUS; SUBCUTANEOUS at 21:40

## 2018-04-16 RX ADMIN — MIDODRINE HYDROCHLORIDE 5 MG: 2.5 TABLET ORAL at 17:07

## 2018-04-16 RX ADMIN — AMIODARONE HYDROCHLORIDE 200 MG: 200 TABLET ORAL at 09:37

## 2018-04-16 RX ADMIN — IPRATROPIUM BROMIDE AND ALBUTEROL SULFATE 3 ML: 2.5; .5 SOLUTION RESPIRATORY (INHALATION) at 06:19

## 2018-04-16 RX ADMIN — CARVEDILOL 6.25 MG: 6.25 TABLET, FILM COATED ORAL at 21:39

## 2018-04-16 RX ADMIN — AMOXICILLIN AND CLAVULANATE POTASSIUM 1 TABLET: 875; 125 TABLET, FILM COATED ORAL at 09:38

## 2018-04-16 RX ADMIN — MIDODRINE HYDROCHLORIDE 5 MG: 2.5 TABLET ORAL at 21:39

## 2018-04-16 RX ADMIN — HYDROCODONE BITARTRATE AND ACETAMINOPHEN 1 TABLET: 5; 325 TABLET ORAL at 21:39

## 2018-04-16 RX ADMIN — MIDODRINE HYDROCHLORIDE 5 MG: 2.5 TABLET ORAL at 09:38

## 2018-04-16 RX ADMIN — AMOXICILLIN AND CLAVULANATE POTASSIUM 1 TABLET: 875; 125 TABLET, FILM COATED ORAL at 21:39

## 2018-04-16 RX ADMIN — ENOXAPARIN SODIUM 40 MG: 40 INJECTION SUBCUTANEOUS at 17:07

## 2018-04-16 RX ADMIN — LEVOTHYROXINE SODIUM 75 MCG: 75 TABLET ORAL at 09:37

## 2018-04-16 RX ADMIN — FERROUS SULFATE TAB 325 MG (65 MG ELEMENTAL FE) 325 MG: 325 (65 FE) TAB at 09:38

## 2018-04-16 RX ADMIN — IPRATROPIUM BROMIDE AND ALBUTEROL SULFATE 3 ML: 2.5; .5 SOLUTION RESPIRATORY (INHALATION) at 19:35

## 2018-04-16 RX ADMIN — IPRATROPIUM BROMIDE AND ALBUTEROL SULFATE 3 ML: 2.5; .5 SOLUTION RESPIRATORY (INHALATION) at 11:39

## 2018-04-16 NOTE — PLAN OF CARE
Problem: Patient Care Overview  Goal: Plan of Care Review  Outcome: Ongoing (interventions implemented as appropriate)   04/16/18 1009   Coping/Psychosocial   Plan of Care Reviewed With patient   Plan of Care Review   Progress improving   OTHER   Outcome Summary Pt Min A x 2 supine to sit EOB, Min A x 2 Sit to Stand from bed, Min A x 2 Gait with RW 25' x 4 rps with 3 standing rest breaks.

## 2018-04-16 NOTE — THERAPY TREATMENT NOTE
Acute Care - Speech Language Pathology   Swallow Treatment Note [facility]     Patient Name: Delno Cid  : 1935  MRN: [id number]  Today's Date: 2018  Onset of Illness/Injury or Date of Surgery: 18     Referring Physician: ROCIO Reynolds      Admit Date: 2018  Swallowing therapy completed. Pt alert sitting upright in bed. Upon entering the room pt was consuming thin liquid, coffee. Pt education completed on the importance of thicken liquids. Education completed on pt taking meds with thickened liquids/ pudding/ applesauce. Also, education on the importance of oral care was provided to the pt and spouse. Pt completed trials of honey thick without any overt s/s of aspiration. Pt completed labial excersises, 4/5 for 3 sets at 70% accuracy, given modeling and verbal cues. Pt completed lingual exersises (protursion, laterilzation and ROM) 2/2 for 3 sets, at 70% accuracy given modleing and verbal cues. Pt completed effortful swallow 2/2 for 3 sets with 60% accuracy, given modeling and verbal cues. Pt attempteded derrek x2 however pt was unable to accuractly complete. Pt reuqired constant redirection and prompts to complete exersises. Pt exhibited decreased sustained attention throughout the therapy session. SLP will continue to follow and treat.   Lam Levy, Speech Therapy Student 2018 11:27 AM  Visit Dx:      ICD-10-CM ICD-9-CM   1. Dehydration E86.0 276.51   2. Hypotension, unspecified hypotension type I95.9 458.9   3. Oropharyngeal dysphagia R13.12 787.22   4. Impaired functional mobility, balance, gait, and endurance Z74.09 V49.89   5. Impaired mobility and ADLs Z74.09 799.89     Patient Active Problem List   Diagnosis   • Dehydration   • Healthcare-associated pneumonia   • Chronic systolic congestive heart failure   • Shortness of breath   • V-tach   • Other hyperlipidemia   • Type 2 diabetes mellitus   • Dementia       Therapy Treatment    Therapy Treatment / Health  Promotion    Treatment Time/Intention  Discipline: (P) speech language pathologist (04/16/18 1037 : Lam Levy Speech Therapy Student)  Document Type: (P) therapy note (daily note) (04/16/18 1037 : Lam Levy Speech Therapy Student)  Subjective Information: (P) no complaints (04/16/18 1037 : Lam Levy Speech Therapy Student)  Patient/Family Observations: (P) Wife present during therapy session (04/16/18 1037 : Lam Levy Speech Therapy Student)  Care Plan Review: (P) care plan/treatment goals reviewed (04/16/18 1037 : Lam Levy Speech Therapy Student)  Care Plan Review, Other Participant(s): (P) spouse (04/16/18 1037 : Lam Levy, Speech Therapy Student)  Patient Effort: (P) good (04/16/18 1037 : Lam Levy Speech Therapy Student)  Plan of Care Review  Plan of Care Reviewed With: (P) patient, spouse (04/16/18 1119 : Lam Levy Speech Therapy Student)    Vitals/Pain/Safety  Pain Assessment  Additional Documentation: (P) Pain Scale: FACES Pre/Post-Treatment (Group) (04/16/18 1037 : Lam Levy Speech Therapy Student)  Pain Scale: FACES Pre/Post-Treatment  Pain: FACES Scale, Pretreatment: (P) 0-->no hurt (04/16/18 1037 : Lam Levy Speech Therapy Student)  Pain: FACES Scale, Post-Treatment: (P) 0-->no hurt (04/16/18 1037 : Lam Levy Speech Therapy Student)    Cognition, Communication, Swallow  Recommendations  Anticipated Dischage Disposition: (P) unknown (04/16/18 1037 : Lam Levy, Speech Therapy Student)    Outcome Summary  Outcome Summary/Treatment Plan (SLP)  Daily Summary of Progress (SLP): (P) progress toward functional goals is gradual (04/16/18 1037 : Lam Levy Speech Therapy Student)  Barriers to Overall Progress (SLP): (P) Cognition (04/16/18 1037 : Lam Levy Speech Therapy Student)  Plan for Continued Treatment (SLP): (P) SLP will continue to follow and treat.  (04/16/18 1037 : Lam Levy Speech [place]  Student)  Anticipated Dischage Disposition: (P) unknown (04/16/18 1037 : Lam Levy, Speech Therapy Student)            SLP GOALS     Row Name 04/16/18 1037             Oral Nutrition/Hydration Goal 1 (SLP)    Oral Nutrition/Hydration Goal 1, SLP (P)  Pt will tolerate trials of recommended anad upgraded diet consistencies without overt s/s of aspiration.   -DS      Time Frame (Oral Nutrition/Hydration Goal 1, SLP) (P)  by discharge  -DS      Barriers (Oral Nutrition/Hydration Goal 1, SLP) (P)  cognition  -DS      Progress/Outcomes (Oral Nutrition/Hydration Goal 1, SLP) (P)  continuing progress toward goal  -DS         Labial Strengthening Goal 1 (SLP)    Activity (Labial Strengthening Goal 1, SLP) (P)  increase labial tone  -DS      Increase Labial Tone (P)  labial resistance exercises  -DS      [place]/Accuracy (Labial Strengthening Goal 1, SLP) (P)  with minimal cues (75-90% accuracy)  -DS      Time Frame (Labial Strengthening Goal 1, SLP) (P)  short term goal (STG);by discharge  -DS      Barriers (Labial Strengthening Goal 1, SLP) (P)  Cognition  -DS      Progress/Outcomes (Labial Strengthening Goal 1, SLP) (P)  continuing progress toward goal  -DS         Lingual Strengthening Goal 1 (SLP)    Activity (Lingual Strengthening Goal 1, SLP) (P)  increase lingual tone/sensation/control/coordination/movement;increase tongue back strength  -DS      Increase Lingual Tone/Sensation/Control/Coordination/Movement (P)  lingual resistance exercises;lingual movement exercises  -DS      Increase Tongue Back Strength (P)  lingual movement exercises  -DS      [place]/Accuracy (Lingual Strengthening Goal 1, SLP) (P)  with minimal cues (75-90% accuracy)  -DS      Time Frame (Lingual Strengthening Goal 1, SLP) (P)  short term goal (STG);by discharge  -DS      Barriers (Lingual Strengthening Goal 1, SLP) (P)  Cognition  -DS      Progress/Outcomes (Lingual Strengthening Goal 1, SLP) (P)  continuing progress toward goal   -DS         Pharyngeal Strengthening Exercise Goal 1 (SLP)    Activity (Pharyngeal Strengthening Goal 1, SLP) (P)  increase timing;increase closure at entrance to airway/closure of airway at glottis;increase squeeze/positive pressure generation;increase tongue base retraction  -DS      Increase Timing (P)  gustatory stimulation (sour/cold)  -DS      Increase Squeeze/Positive Pressure Generation (P)  hard effortful swallow  -DS      Increase Tongue Base Retraction (P)  derrek  -DS      [place]/Accuracy (Pharyngeal Strengthening Goal 1, SLP) (P)  with minimal cues (75-90% accuracy)  -DS      Time Frame (Pharyngeal Strengthening Goal 1, SLP) (P)  short term goal (STG);by discharge  -DS      Barriers (Pharyngeal Strengthening Goal 1, SLP) (P)  Cognition  -DS      Progress/Outcomes (Pharyngeal Strengthening Goal 1, SLP) (P)  continuing progress toward goal  -DS        User Key  (r) = Recorded By, (t) = Taken By, (c) = Cosigned By    Initials Name Provider Type    DS Lam Levy, Speech Therapy Student Speech Therapy Student          EDUCATION  The patient has been educated in the following areas:   Dysphagia (Swallowing Impairment).    SLP Recommendation and Plan                       Anticipated Dischage Disposition: (P) unknown                Plan of Care Reviewed With: (P) patient, spouse  Plan of Care Review  Plan of Care Reviewed With: (P) patient, spouse  Daily Summary of Progress (SLP): (P) progress toward functional goals is gradual  Plan for Continued Treatment (SLP): (P) SLP will continue to follow and treat.   Progress: (P) no change  Outcome Summary: (P) Swallowing therapy completed. Pt alert sitting upright in bed. Upon entering the room pt was consuming thin liquid, coffee. Pt education completed on the importance of thicken liquids. Education completed on pt taking meds with thickened liquids/ pudding/ applesauce. Also, education on the importance of oral care was provided to the pt and spouse.  Pt completed trials of honey thick without any overt s/s of aspiration. Pt completed labial excersises, 4/5 for 3 sets at 70% accuracy, given modeling and verbal cues. Pt completed lingual exersises (protursion, laterilzation and ROM) 2/2 for 3 sets, at 70% accuracy given modleing and verbal cues. Pt completed effortful swallow 2/2 for 3 sets with 60% accuracy, given modeling and verbal cues. Pt attempteded derrek x2 however pt was unable to accuractly complete. Pt reuqired constant redirection and prompts to complete exersises. Pt exhibited decreased sustained attention throughout the therapy session. SLP will continue to follow and treat.            Time Calculation:         Time Calculation- SLP     Row Name 04/16/18 1126             Time Calculation- SLP    SLP Start Time (P)  1037  -DS      SLP Stop Time (P)  1103  -DS      SLP Time Calculation (min) (P)  26 min  -DS      SLP Received On (P)  04/16/18  -DS        User Key  (r) = Recorded By, (t) = Taken By, (c) = Cosigned By    Initials Name Provider Type    ADRIANA Levy Speech Therapy Student Speech Therapy Student          Therapy Charges for Today     Code Description Service Date Service Provider Modifiers Qty    82200926511  ST TREATMENT SWALLOW 2 4/16/2018 Lam Levy, Speech Therapy Student ANNALEE GUTIÉRREZ 1          SLP G-Codes  SLP NOMS Used?: Yes  Functional Limitations: Swallowing  Swallow Current Status (): At least 40 percent but less than 60 percent impaired, limited or restricted  Swallow Goal Status (): At least 20 percent but less than 40 percent impaired, limited or restricted      Lam Levy Speech Therapy Student  4/16/2018

## 2018-04-16 NOTE — THERAPY TREATMENT NOTE
Acute Care - Physical Therapy Treatment Note  [facility]     Patient Name: Delon Cid  : 1935  MRN: [id number]  Today's Date: 2018  Onset of Illness/Injury or Date of Surgery: 18  Date of Referral to PT: 18  Referring Physician: ROCIO Reynolds    Admit Date: 2018    Visit Dx:    ICD-10-CM ICD-9-CM   1. Dehydration E86.0 276.51   2. Hypotension, unspecified hypotension type I95.9 458.9   3. Oropharyngeal dysphagia R13.12 787.22   4. Impaired functional mobility, balance, gait, and endurance Z74.09 V49.89   5. Impaired mobility and ADLs Z74.09 799.89     Patient Active Problem List   Diagnosis   • Dehydration   • Healthcare-associated pneumonia   • Chronic systolic congestive heart failure   • Shortness of breath   • V-tach   • Other hyperlipidemia   • Type 2 diabetes mellitus   • Dementia       Therapy Treatment          Rehabilitation Treatment Summary     Row Name 18 1427 18 1037          Treatment Time/Intention    Discipline physical therapy assistant  -LG speech language pathologist  -CS,DS,CS2     Document Type therapy note (daily note)  -LG2 therapy note (daily note)  -CS,DS,CS2     Subjective Information no complaints  -LG2 no complaints  -CS,DS,CS2     Mode of Treatment physical therapy  -LG2  --     Patient/Family Observations  -- Wife present during therapy session  -CS,DS,CS2     Care Plan Review  -- care plan/treatment goals reviewed  -CS,DS,CS2     Care Plan Review, Other Participant(s)  -- spouse  -CS,DS,CS2     Patient Effort  -- good  -CS,DS,CS2     Existing Precautions/Restrictions fall  -LG2  --     Recorded by [LG] Chapin Combs, PTA 18 1427 18 1427  [LG2] Chapin Combs, PTA 18 1502 18 1502 [CS,DS,CS2] Yeison Jennings, MS CCC-SLP (r) Lam Levy, Speech Therapy Student (t) Yeison Jennings, MS CCC-SLP (c) 18 1316 18 1316     Row Name 18 1427             Bed Mobility Assessment/Treatment    Supine-Sit  [place] (Bed Mobility) verbal cues;minimum assist (75% patient effort);moderate assist (50% patient effort)  -LG      Sit-Supine [place] (Bed Mobility) not tested;other (see comments)   up in bedside chair  -LG      Recorded by [LG] Chapin Combs, [facility] 04/16/18 1502 04/16/18 1502      Row Name 04/16/18 1427             Transfer Assessment/Treatment    Sit-Stand [place] (Transfers) verbal cues;minimum assist (75% patient effort);2 person assist  -LG      Stand-Sit [place] (Transfers) verbal cues;minimum assist (75% patient effort);2 person assist  -LG      Recorded by [LG] Chapin Combs, [facility] 04/16/18 1502 04/16/18 1502      Row Name 04/16/18 1427             Sit-Stand Transfer    Assistive Device (Sit-Stand Transfers) walker, front-wheeled  -LG      Recorded by [LG] Chapin Combs, [facility] 04/16/18 1502 04/16/18 1502      Row Name 04/16/18 1427             Stand-Sit Transfer    Assistive Device (Stand-Sit Transfers) walker, front-wheeled  -LG      Recorded by [LG] Chapin Combs, [facility] 04/16/18 1502 04/16/18 1502      Row Name 04/16/18 1427             Gait/Stairs Assessment/Training    Gait/Stairs Assessment/Training gait/ambulation independence  -LG      [place] Level (Gait) verbal cues;minimum assist (75% patient effort);2 person assist  -LG      Assistive Device (Gait) walker, front-wheeled  -LG      Distance in Feet (Gait) 20   x4 reps with short standing rests breaks  -LG      Deviations/Abnormal Patterns (Gait) base of support, narrow;festinating/shuffling;gait speed decreased   strong forward head posture with cervical flexion  -LG      Left Sided Gait Deviations foot drop/toe drag;forward flexed posture  -LG      Recorded by [LG] Chapin Combs, [facility] 04/16/18 1502 04/16/18 1502      Row Name 04/16/18 1427             Therapeutic Exercise    Comment (Therapeutic Exercise) B LE Strengthening Exercises x 10 reps x 2 sets.   -LG      Recorded by [LG] Chapin Combs, MAGED 04/16/18 1502 04/16/18 1502       Row Name 04/16/18 1427             Positioning and Restraints    Pre-Treatment Position in bed  -LG      Post Treatment Position chair  -LG      In Chair reclined;legs elevated;with family/caregiver;call light within reach;encouraged to call for assist;notified nsg  -LG      Recorded by [LG] Chapin Combs PTA 04/16/18 1502 04/16/18 1502      Row Name 04/16/18 1037             Pain Assessment    Additional Documentation Pain Scale: FACES Pre/Post-Treatment (Group)  -CS,DS,CS2      Recorded by [CS,DS,CS2] Yeison Jennings, MS CCC-SLP (r) Lam Levy, Speech Therapy Student (t) Yeison Jennings MS CCC-SLP (c) 04/16/18 1316 04/16/18 1316      Row Name 04/16/18 1037             Pain Scale: FACES Pre/Post-Treatment    Pain: FACES Scale, Pretreatment 0-->no hurt  -CS,DS,CS2      Pain: FACES Scale, Post-Treatment 0-->no hurt  -CS,DS,CS2      Recorded by [CS,DS,CS2] Yeison Jennings, MS CCC-SLP (r) Lam Levy, Speech Therapy Student (t) Yeison Jennings MS CCC-SLP (c) 04/16/18 1316 04/16/18 1316      Row Name                Wound 04/11/18 1610 Left foot abrasion;blisters    Wound - Properties Group Date first assessed: 04/11/18 [KP] Time first assessed: 1610 [KP] Present On Admission : yes;picture taken [KP] Side: Left [KP] Location: foot [KP] Type: abrasion;blisters [KP] Recorded by:  [KP] Dana Ball RN 04/11/18 1711 04/11/18 1711    Row Name                Wound 04/11/18 1610 Bilateral upper gluteal    Wound - Properties Group Date first assessed: 04/11/18 [KP] Time first assessed: 1610 [KP] Present On Admission : yes;picture taken [KP] Side: Bilateral [KP] Orientation: upper [KP] Location: gluteal [KP] Stage, Pressure Injury: Stage 1 [KP] Recorded by:  [KP] Dana Ball RN 04/11/18 1712 04/11/18 1712    Row Name 04/16/18 1037             Outcome Summary/Treatment Plan (SLP)    Daily Summary of Progress (SLP) progress toward functional goals is gradual  -CS,DS,CS2      Barriers to Overall Progress (SLP) Cognition   -CS,DS,CS2      Plan for Continued Treatment (SLP) SLP will continue to follow and treat.   -CS,DS,CS2      Anticipated Dischage Disposition unknown  -CS,DS,CS2      Recorded by [CS,DS,CS2] Yeison Jennings, MS CCC-SLP (r) Lam Levy, Speech Therapy Student (t) Yeison Jennings, MS CCC-SLP (c) 04/16/18 1316 04/16/18 1316        User Key  (r) = Recorded By, (t) = Taken By, (c) = Cosigned By    Initials Name Effective Dates Discipline    LG Chapin Combs, MAGED 08/02/16 -  PT    WHIT Ball, RN 08/02/16 -  Nurse    CS Yeison Jennings MS CCC-SLP 04/03/18 -  SLP    ADRIANA Levy, Speech Therapy Student 03/02/18 -  SLP          Wound 04/11/18 1610 Left foot abrasion;blisters (Active)   Dressing Appearance open to air 4/16/2018  8:12 AM   Drainage Amount none 4/16/2018  8:12 AM   Dressing Care, Wound open to air 4/16/2018  8:12 AM       Wound 04/11/18 1610 Bilateral upper gluteal (Active)   Dressing Appearance dry;intact 4/16/2018  8:12 AM   Drainage Amount none 4/16/2018  8:12 AM   Dressing Care, Wound dressing changed;foam 4/16/2018 11:27 AM             Physical Therapy Education     Title: PT OT SLP Therapies (Active)     Topic: Physical Therapy (Active)     Point: Mobility training (Active)    Learning Progress Summary     Learner Status Readiness Method Response Comment Documented by    Patient Active Acceptance E,D NR Educated on benefits of activity. Instructed in Bed Mobility, Transfers, Gait, Therapeutic Exercises. LG 04/16/18 1502     Active Acceptance E NR Educated on posture DWAYNE 04/15/18 1114     Active Acceptance E NR Educated on purpose and benefits of therapy; bed mobility, sit <-> stand with RW; weight shifting to clear foot to take a step; discussion with pt and spouse on benefits of continued therapy at SNF after DC for increased safety at home LN 04/13/18 1601    Family Active Acceptance E,D NR Educated on benefits of activity. Instructed in Bed Mobility, Transfers, Gait, Therapeutic Exercises. LG  04/16/18 1502     Active Acceptance E NR Educated on purpose and benefits of therapy; bed mobility, sit <-> stand with RW; weight shifting to clear foot to take a step; discussion with pt and spouse on benefits of continued therapy at SNF after DC for increased safety at home LN 04/13/18 1601          Point: Home exercise program (Active)    Learning Progress Summary     Learner Status Readiness Method Response Comment Documented by    Patient Active Acceptance E,D NR Educated on benefits of activity. Instructed in Bed Mobility, Transfers, Gait, Therapeutic Exercises. LG 04/16/18 1502    Family Active Acceptance E,D NR Educated on benefits of activity. Instructed in Bed Mobility, Transfers, Gait, Therapeutic Exercises. LG 04/16/18 1502          Point: Body mechanics (Active)    Learning Progress Summary     Learner Status Readiness Method Response Comment Documented by    Patient Active Acceptance E,D NR Educated on benefits of activity. Instructed in Bed Mobility, Transfers, Gait, Therapeutic Exercises.  04/16/18 1502     Active Acceptance E NR Educated on purpose and benefits of therapy; bed mobility, sit <-> stand with RW; weight shifting to clear foot to take a step; discussion with pt and spouse on benefits of continued therapy at SNF after DC for increased safety at home LN 04/13/18 1601    Family Active Acceptance E,D NR Educated on benefits of activity. Instructed in Bed Mobility, Transfers, Gait, Therapeutic Exercises.  04/16/18 1502     Active Acceptance E NR Educated on purpose and benefits of therapy; bed mobility, sit <-> stand with RW; weight shifting to clear foot to take a step; discussion with pt and spouse on benefits of continued therapy at SNF after DC for increased safety at home LN 04/13/18 1601          Point: Precautions (Active)    Learning Progress Summary     Learner Status Readiness Method Response Comment Documented by    Patient Active Acceptance E,D NR Educated on benefits of  activity. Instructed in Bed Mobility, Transfers, Gait, Therapeutic Exercises.  04/16/18 1502     Active Acceptance E NR Educated on purpose and benefits of therapy; bed mobility, sit <-> stand with RW; weight shifting to clear foot to take a step; discussion with pt and spouse on benefits of continued therapy at SNF after DC for increased safety at home LN 04/13/18 1601    Family Active Acceptance E,D NR Educated on benefits of activity. Instructed in Bed Mobility, Transfers, Gait, Therapeutic Exercises.  04/16/18 1502     Active Acceptance E NR Educated on purpose and benefits of therapy; bed mobility, sit <-> stand with RW; weight shifting to clear foot to take a step; discussion with pt and spouse on benefits of continued therapy at SNF after DC for increased safety at home LN 04/13/18 1601                      User Key     Initials Effective Dates Name Provider Type Discipline     08/02/16 -  Chapin Combs, PTA Physical Therapy Assistant PT    DWAYNE 08/02/16 -  Cr Berry, PTA Physical Therapy Assistant PT    LN 01/08/18 -  Pankaj Saucedo, PT Student PT Student PT                    PT Recommendation and Plan     Plan of Care Reviewed With: patient  Progress: improving  Outcome Summary: Pt Min A x 2 supine to sit EOB, Min A x 2 Sit to Stand from bed, Min A x 2 Gait with RW 25' x 4 rps with 3 standing rest breaks.           Outcome Measures     Row Name 04/16/18 1504 04/15/18 1034 04/13/18 1600       How much help from another person do you currently need...    Turning from your back to your side while in flat bed without using bedrails? 3  -LG 3  -DWAYNE  --    Moving from lying on back to sitting on the side of a flat bed without bedrails? 3  -LG 3  -DWAYNE  --    Moving to and from a bed to a chair (including a wheelchair)? 2  -LG 2  -DWAYNE  --    Standing up from a chair using your arms (e.g., wheelchair, bedside chair)? 2  -LG 2  -DWAYNE  --    Climbing 3-5 steps with a railing? 1  -LG 1  -DWAYNE  --    To walk in hospital  room? 2  -LG 2  -DWAYNE  --    AM-PAC 6 Clicks Score 13  -LG 13  -DWAYNE  --       How much help from another is currently needed...    Putting on and taking off regular lower body clothing?  --  -- 2  -MM    Bathing (including washing, rinsing, and drying)  --  -- 2  -MM    Toileting (which includes using toilet bed pan or urinal)  --  -- 2  -MM    Putting on and taking off regular upper body clothing  --  -- 2  -MM    Taking care of personal grooming (such as brushing teeth)  --  -- 2  -MM    Eating meals  --  -- 2  -MM    Score  --  -- 12  -MM       Functional Assessment    Outcome Measure Options AM-PAC 6 Clicks Basic Mobility (PT)  -LG AM-PAC 6 Clicks Basic Mobility (PT)  -DWAYNE AM-PAC 6 Clicks Daily Activity (OT)  -MM      User Key  (r) = Recorded By, (t) = Taken By, (c) = Cosigned By    Initials Name Provider Type    LG Chapin Combs PTA Physical Therapy Assistant    DWAYNE Berry PTA Physical Therapy Assistant    MM Liu Gómez, OTR/L Occupational Therapist           Time Calculation:         PT Charges     Row Name 04/16/18 1427             Time Calculation    Start Time 1427  -      Stop Time 1505  -      Time Calculation (min) 38 min  -LG      PT Received On 04/16/18  -      PT Goal Re-Cert Due Date 04/23/18  -        User Key  (r) = Recorded By, (t) = Taken By, (c) = Cosigned By    Initials Name Provider Type     Chapin Combs PTA Physical Therapy Assistant          Therapy Charges for Today     Code Description Service Date Service Provider Modifiers Qty    94178015574 HC GAIT TRAINING EA 15 MIN 4/16/2018 Chapin Combs PTA GP, KX 1    04417672850 HC PT THERAPEUTIC ACT EA 15 MIN 4/16/2018 Chapin Combs PTA GP, KX 1    [id number] HC PT THER PROC EA 15 MIN 4/16/2018 MAGED Andino, KX 1          PT G-Codes  Outcome Measure Options: AM-PAC 6 Clicks Basic Mobility (PT)  Score: 12  Functional Limitation: Mobility: Walking and moving around  Mobility: Walking and Moving Around Current Status  (): At least 60 percent but less than 80 percent impaired, limited or restricted  Mobility: Walking and Moving Around Goal Status (): At least 40 percent but less than 60 percent impaired, limited or restricted    Chapin Combs, PTA  4/16/2018

## 2018-04-16 NOTE — PROGRESS NOTES
[facility] Medicine Services  INPATIENT PROGRESS NOTE    Length of Stay: 5  Date of Admission: 4/11/2018  Primary Care Physician: Juno Moscoso MD    Subjective   Chief Complaint: Altered mental status/dementia    HPI   Overall, patient's improving slowly.  Patient still remained very weak.  Patient denies any chest pain or shortness of breath.  Patient's tolerating diet.    Review of Systems   Constitutional: Positive for activity change, appetite change and fatigue. Negative for chills and fever.   HENT: Negative for hearing loss, nosebleeds, tinnitus and trouble swallowing.    Eyes: Negative for visual disturbance.   Respiratory: Positive for shortness of breath. Negative for cough, chest tightness and wheezing.    Cardiovascular: Negative for chest pain, palpitations and leg swelling.   Gastrointestinal: Negative for abdominal distention, abdominal pain, blood in stool, constipation, diarrhea, nausea and vomiting.   Endocrine: Negative for cold intolerance, heat intolerance, polydipsia, polyphagia and polyuria.   Genitourinary: Negative for decreased urine volume, difficulty urinating, dysuria, flank pain, frequency and hematuria.   Musculoskeletal: Positive for arthralgias, gait problem and myalgias. Negative for joint swelling.   Skin: Negative for rash.   Allergic/Immunologic: Negative for immunocompromised state.   Neurological: Positive for weakness. Negative for dizziness, syncope, light-headedness and headaches.   Hematological: Negative for adenopathy. Does not bruise/bleed easily.   Psychiatric/Behavioral: Positive for confusion. Negative for sleep disturbance. The patient is not nervous/anxious.         All pertinent negatives and positives are as above. All other systems have been reviewed and are negative unless otherwise stated.     Objective    Temp:  [97 °F (36.1 °C)-98.2 °F (36.8 °C)] 97.6 °F (36.4 °C)  Heart Rate:  [62-71] 65  Resp:  [16-20] 16  BP:  (129-155)/(47-80) 129/47    Intake/Output Summary (Last 24 hours) at 04/16/18 1725  Last data filed at 04/16/18 0900   Gross per 24 hour   Intake              480 ml   Output             1000 ml   Net             -520 ml     Physical Exam   Constitutional: He appears well-developed.   HENT:   Head: Normocephalic and atraumatic.   Eyes: Conjunctivae and EOM are normal. Pupils are equal, round, and reactive to light.   Neck: Neck supple. No JVD present. No thyromegaly present.   Cardiovascular: Normal rate, regular rhythm, normal heart sounds and intact distal pulses.  Exam reveals no gallop and no friction rub.    No murmur heard.  Pulmonary/Chest: Effort normal. No respiratory distress. He has wheezes. He has no rales. He exhibits no tenderness.   Diminished breath sound bilateral   Abdominal: Soft. Bowel sounds are normal. He exhibits no distension. There is no tenderness. There is no rebound and no guarding.   Musculoskeletal: He exhibits no edema, tenderness or deformity.   Lymphadenopathy:     He has no cervical adenopathy.   Neurological: He is alert. He displays normal reflexes. No cranial nerve deficit. He exhibits abnormal muscle tone. Coordination abnormal.   Skin: Skin is warm and dry. No rash noted.   Psychiatric: He has a normal mood and affect.   Nursing note and vitals reviewed.      Results Review:  Lab Results (last 24 hours)     Procedure Component Value Units Date/Time    POC Glucose Once [[id number]]  (Abnormal) Collected:  04/16/18 1528    Specimen:  Blood Updated:  04/16/18 1540     Glucose 351 (H) mg/dL      Comment: : [id number] [place] [address] ID: [id number]       Blood Culture - Blood, Blood, Venous Line [[id number]]  (Normal) Collected:  04/11/18 1325    Specimen:  Blood from Arm, Left Updated:  04/16/18 1431     Blood Culture No growth at 5 days    Blood Culture - Blood, Blood, Venous Line [[id number]]  (Normal) Collected:  04/11/18 1329    Specimen:  Blood from Arm, Left Updated:   04/16/18 1431     Blood Culture No growth at 5 days    POC Glucose Once [[id number]]  (Abnormal) Collected:  04/16/18 1124    Specimen:  Blood Updated:  04/16/18 1136     Glucose 172 (H) mg/dL      [facility] [id number]: [id number]       POC Glucose Once [[id number]]  (Normal) Collected:  04/16/18 0735    Specimen:  Blood Updated:  04/16/18 0845     Glucose 94 mg/dL      Comment: : [id number] [facility] ID: [id number]       Comprehensive Metabolic Panel [[id number]]  (Abnormal) Collected:  04/16/18 0310    Specimen:  Blood Updated:  04/16/18 0339     Glucose 85 mg/dL      BUN 11 mg/dL      Creatinine 0.90 mg/dL      Sodium 139 mmol/L      Potassium 3.7 mmol/L      Chloride 101 mmol/L      CO2 30.0 mmol/L      Calcium 9.0 mg/dL      Total Protein 6.0 (L) g/dL      Albumin 3.20 (L) g/dL      ALT (SGPT) 33 U/L      AST (SGOT) 37 U/L      Alkaline Phosphatase 83 U/L      Total Bilirubin 0.5 mg/dL      eGFR Non African Amer 81 mL/min/1.73      Globulin 2.8 gm/dL      A/G Ratio 1.1 g/dL      BUN/Creatinine Ratio 12.2     Anion Gap 8.0 mmol/L     Narrative:       The MDRD GFR formula is only valid for adults with stable renal function between ages 18 and 70.    CBC & Differential [[id number]] Collected:  04/16/18 0310    Specimen:  Blood Updated:  04/16/18 0319    Narrative:       The following orders were created for panel order CBC & Differential.  Procedure                               Abnormality         Status                     ---------                               -----------         ------                     CBC Auto Differential[[id number]]        Abnormal            Final result                 Please view results for these tests on the individual orders.    CBC Auto Differential [[id number]]  (Abnormal) Collected:  04/16/18 0310    Specimen:  Blood Updated:  04/16/18 0319     WBC 4.17 (L) 10*3/mm3      RBC 3.53 (L) 10*6/mm3      Hemoglobin 10.4 (L) g/dL      Hematocrit 32.3  (L) %      MCV 91.5 fL      MCH 29.5 pg      MCHC 32.2 (L) g/dL      RDW 14.0 %      RDW-SD 46.4 fl      MPV 10.6 fL      Platelets 186 10*3/mm3      Neutrophil % 61.9 %      Lymphocyte % 24.9 %      Monocyte % 10.8 %      Eosinophil % 1.4 %      Basophil % 0.5 %      Immature Grans % 0.5 %      Neutrophils, Absolute 2.58 10*3/mm3      Lymphocytes, Absolute 1.04 10*3/mm3      Monocytes, Absolute 0.45 10*3/mm3      Eosinophils, Absolute 0.06 10*3/mm3      Basophils, Absolute 0.02 10*3/mm3      Immature Grans, Absolute 0.02 10*3/mm3      nRBC 0.0 /100 WBC     POC Glucose Once [[id number]]  (Abnormal) Collected:  04/15/18 1928    Specimen:  Blood Updated:  04/15/18 1940     Glucose 303 (H) mg/dL      Comment: : [id number] [facility] ID: [id number]              Cultures:  Blood Culture   Date Value Ref Range Status   04/11/2018 No growth at 5 days  Final   04/11/2018 No growth at 5 days  Final     Respiratory Culture   Date Value Ref Range Status   04/12/2018 Light growth (2+) Normal Respiratory An  Final       Radiology Data:    Imaging Results (last 24 hours)     ** No results found for the last 24 hours. **          No Known Allergies    Scheduled meds:     amiodarone 200 mg Oral Daily   amoxicillin-clavulanate 1 tablet Oral Q12H   aspirin 81 mg Oral Daily   atorvastatin 40 mg Oral Nightly   carvedilol 6.25 mg Oral Q12H   clopidogrel 75 mg Oral Daily   enoxaparin 40 mg Subcutaneous Q24H   ferrous sulfate 325 mg Oral Daily With Breakfast   folic acid 2 mg Oral Daily   guaiFENesin 1,200 mg Oral Q12H   insulin lispro 0-9 Units Subcutaneous 4x Daily With Meals & Nightly   ipratropium-albuterol 3 mL Nebulization Q6H While Awake - RT   levothyroxine 75 mcg Oral Q AM   midodrine 5 mg Oral TID   pantoprazole 40 mg Oral QAM       PRN meds:  •  acetaminophen  •  dextrose  •  dextrose  •  docusate sodium  •  glucagon (human recombinant)  •  HYDROcodone-acetaminophen  •  ondansetron **OR** ondansetron ODT **OR**  ondansetron  •  sodium chloride  •  Insert peripheral IV **AND** sodium chloride  •  traMADol    Assessment/Plan     Principal Problem:    Healthcare-associated pneumonia  Active Problems:    Dehydration    Chronic systolic congestive heart failure    Shortness of breath    V-tach    Other hyperlipidemia    Type 2 diabetes mellitus    Dementia      Plan:  HCAP- Augmentin antibiotics.  Duo nebs.     Chronic Systolic CHF/V. tac/CAD/cardiovascular disease/hypertension-continue Coreg, amiodarone.  Aspirin and Plavix.    Hypotension-Midodrin    Diabetes-insulin sliding scale.     Hyperlipidemia-continue Lipitor    Dementia with behavioral distubrance-Seroquel daily at bedtime     Anemia-iron sulfate and folic acid    Hypothyroidism-Synthroid     GERD-Protonix    Respiratory culture normal respiratory An.  Blood culture no growth in 5 days.  Urine culture shows staph epididymitis.    Discharge Planning:  Pending rehabilitation placement.    Wayne Grace MD   04/16/18   5:25 PM

## 2018-04-16 NOTE — PLAN OF CARE
Problem: Patient Care Overview  Goal: Plan of Care Review  Outcome: Ongoing (interventions implemented as appropriate)   04/16/18 6387   Coping/Psychosocial   Plan of Care Reviewed With patient;significant other   OTHER   Outcome Summary Pt. progressing good, no issues, pt. participated with adl bathing/dressing seated in a rolling shower chair with min-mod. assist from this vu/l!

## 2018-04-16 NOTE — PLAN OF CARE
Problem: Patient Care Overview  Goal: Plan of Care Review  Outcome: Ongoing (interventions implemented as appropriate)   04/16/18 0418   Coping/Psychosocial   Plan of Care Reviewed With patient;spouse   Plan of Care Review   Progress improving   OTHER   Outcome Summary VSS. Pt c/o back and shoulder pain once, medicated with PRN Norco with relief. Sats WNL on RA. Pt still has a productive cough. Case management following for possible placement today. Cont to monitor.        Problem: Fluid Volume Deficit (Adult)  Goal: Optimal Fluid Balance  Outcome: Ongoing (interventions implemented as appropriate)      Problem: Wound (Includes Pressure Injury) (Adult)  Goal: Signs and Symptoms of Listed Potential Problems Will be Absent, Minimized or Managed (Wound)  Outcome: Ongoing (interventions implemented as appropriate)      Problem: Skin Injury Risk (Adult)  Goal: Identify Related Risk Factors and Signs and Symptoms  Outcome: Ongoing (interventions implemented as appropriate)    Goal: Skin Health and Integrity  Outcome: Ongoing (interventions implemented as appropriate)

## 2018-04-16 NOTE — DISCHARGE PLACEMENT REQUEST
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  -Nebs     2.  Chronic Systolic CHF  -Coreg     3.  V-Tach  -Amiodarone  -Coreg     4.  HLD  -Lipitor     5.  Insulin Dependent T2DM  -SSI     6.  Dementia with behavioral distubrance  -Seroquel QHS        7.  Dehydration  -IVF     8.  HLD  -Lipitor     9.  CAD  -Lipitor  -Coreg  -Asa  -Plavix     10.  CVD   -Lipitor  -Asa  -Plavix     11.  Essential HTN  -Coreg        12.  Chronic Anemia  -monitor H&H     13.  GERD  -Protonix              Discharge Planning: I expect the patient to be discharged to NH in 2-3 days    Ishaan Gong MD   04/14/18   10:01 AM      Electronically signed by Ishaan Gong MD at 4/14/2018 10:13 AM     ROCIO Reyes at 4/13/2018  4:24 PM     Attestation signed by Fito Chin MD at 4/13/2018  5:18 PM    I have reviewed the documentation above and agree.                    [facility] -  Progress Note     LOS: 2 days   Patient Care Team:  Juno Moscoso MD as PCP - General  Juno CARY  MD Blanka as PCP - Family Medicine    Chief Complaint: Shortness of Breath    Subjective     Interval History:   Continued coughing and shortness of breath. Blood pressure improved. Denies chest pain or swelling. Refusing telemetry- no acute events before taken off.     Review of Systems:   Review of Systems   Constitution: Positive for weakness and malaise/fatigue. Negative for chills, decreased appetite, fever, weight gain and weight loss.   HENT: Negative for nosebleeds.    Eyes: Negative for visual disturbance.   Cardiovascular: Negative for chest pain, dyspnea on exertion, leg swelling, near-syncope, orthopnea, palpitations, paroxysmal nocturnal dyspnea and syncope.   Respiratory: Positive for cough and shortness of breath. Negative for hemoptysis and snoring.    Endocrine: Negative for cold intolerance and heat intolerance.   Hematologic/Lymphatic: Negative for bleeding problem. Does not bruise/bleed easily.   Skin: Negative for rash.   Musculoskeletal: Negative for back pain and falls.   Gastrointestinal: Negative for abdominal pain, constipation, diarrhea, heartburn, melena, nausea and vomiting.   Genitourinary: Negative for hematuria.   Neurological: Negative for dizziness, headaches and light-headedness.   Psychiatric/Behavioral: Negative for altered mental status.   Allergic/Immunologic: Negative for persistent infections.        Objective     Vital Sign Min/Max for last 24 hours  Temp  Min: 97.6 °F (36.4 °C)  Max: 98.9 °F (37.2 °C)   BP  Min: 119/44  Max: 155/65   Pulse  Min: 70  Max: 92   Resp  Min: 18  Max: 20   SpO2  Min: 94 %  Max: 100 %   No Data Recorded   Weight  Min: 70.5 kg (155 lb 6.4 oz)  Max: 70.5 kg (155 lb 6.4 oz)     1    04/12/18 2046   Weight: 70.5 kg (155 lb 6.4 oz)         Intake/Output Summary (Last 24 hours) at 04/13/18 1624  Last data filed at 04/13/18 0900   Gross per 24 hour   Intake              740 ml   Output              300 ml   Net              440 ml         Physical  Exam:  Physical Exam   Constitutional: He is oriented to person, place, and time. He appears well-developed and well-nourished.   HENT:   Head: Normocephalic and atraumatic.   Eyes: Pupils are equal, round, and reactive to light.   Neck: Normal range of motion. Neck supple. No JVD present. Carotid bruit is not present.   Cardiovascular: Normal rate, regular rhythm, normal heart sounds and intact distal pulses.    Pulmonary/Chest: Effort normal and breath sounds normal.   Abdominal: Soft. Bowel sounds are normal.   Musculoskeletal: Normal range of motion. He exhibits edema (lower leg edema has improved).   Neurological: He is alert and oriented to person, place, and time. He has normal reflexes.   Skin: Skin is warm and dry.   Psychiatric: He has a normal mood and affect. His behavior is normal. Judgment and thought content normal.        Results Review:   Lab Results (last 72 hours)     Procedure Component Value Units Date/Time    Blood Culture - Blood, Blood, Venous Line [[id number]]  (Normal) Collected:  04/11/18 1325    Specimen:  Blood from Arm, Left Updated:  04/13/18 1401     Blood Culture No growth at 2 days    Blood Culture - Blood, Blood, Venous Line [[id number]]  (Normal) Collected:  04/11/18 1329    Specimen:  Blood from Arm, Left Updated:  04/13/18 1401     Blood Culture No growth at 2 days    POC Glucose Once [[id number]]  (Abnormal) Collected:  04/13/18 1136    Specimen:  Blood Updated:  04/13/18 1206     Glucose 194 (H) mg/dL      Comment: : [address] [place] [address] ID: [id number]       Comprehensive Metabolic Panel [[id number]]  (Abnormal) Collected:  04/13/18 1116    Specimen:  Blood Updated:  04/13/18 1159     Glucose 207 (H) mg/dL      BUN 18 mg/dL      Creatinine 0.91 mg/dL      Sodium 136 mmol/L      Potassium 3.6 mmol/L      Chloride 100 mmol/L      CO2 24.0 mmol/L      Calcium 9.0 mg/dL      Total Protein 5.6 (L) g/dL      Albumin 3.10 (L) g/dL      ALT (SGPT) 35 U/L      AST (SGOT) 41  U/L      Alkaline Phosphatase 99 U/L      Total Bilirubin 0.6 mg/dL      eGFR Non African Amer 80 mL/min/1.73      Globulin 2.5 gm/dL      A/G Ratio 1.2 g/dL      BUN/Creatinine Ratio 19.8     Anion Gap 12.0 mmol/L     Narrative:       The MDRD GFR formula is only valid for adults with stable renal function between ages 18 and 70.    CBC & Differential [[id number]] Collected:  04/13/18 1116    Specimen:  Blood Updated:  04/13/18 1142    Narrative:       The following orders were created for panel order CBC & Differential.  Procedure                               Abnormality         Status                     ---------                               -----------         ------                     CBC Auto Differential[[id number]]        Abnormal            Final result                 Please view results for these tests on the individual orders.    CBC Auto Differential [[id number]]  (Abnormal) Collected:  04/13/18 1116    Specimen:  Blood Updated:  04/13/18 1142     WBC 6.63 10*3/mm3      RBC 3.28 (L) 10*6/mm3      Hemoglobin 9.7 (L) g/dL      Hematocrit 29.6 (L) %      MCV 90.2 fL      MCH 29.6 pg      MCHC 32.8 (L) g/dL      RDW 13.8 %      RDW-SD 45.3 fl      MPV 10.5 fL      Platelets 169 10*3/mm3      Neutrophil % 82.3 (H) %      Lymphocyte % 8.6 (L) %      Monocyte % 8.1 %      Eosinophil % 0.2 %      Basophil % 0.3 %      Immature Grans % 0.5 %      Neutrophils, Absolute 5.46 10*3/mm3      Lymphocytes, Absolute 0.57 (L) 10*3/mm3      Monocytes, Absolute 0.54 10*3/mm3      Eosinophils, Absolute 0.01 10*3/mm3      Basophils, Absolute 0.02 10*3/mm3      Immature Grans, Absolute 0.03 10*3/mm3      nRBC 0.0 /100 WBC     Respiratory Culture - Sputum, Cough [[id number]] Collected:  04/12/18 0639    Specimen:  Sputum from Cough Updated:  04/13/18 0819     Respiratory Culture --      Light growth (2+) Normal Respiratory An     Gram Stain Result Greater than 25 WBCs per low power field      Moderate (3+) Mixed gram  positive flaquita    POC Glucose Once [[id number]]  (Abnormal) Collected:  04/13/18 0733    Specimen:  Blood Updated:  04/13/18 0803     Glucose 281 (H) mg/dL      Comment: : [id number] Emigdio Mena ID: [id number]       POC Glucose Once [[id number]]  (Abnormal) Collected:  04/12/18 2054    Specimen:  Blood Updated:  04/12/18 2116     Glucose 247 (H) mg/dL      Comment: : [id number] Em CandelariaMeter ID: [id number]       POC Glucose Once [[id number]]  (Abnormal) Collected:  04/12/18 1842    Specimen:  Blood Updated:  04/12/18 1854     Glucose 154 (H) mg/dL      Comment: : [id number] Nico RutherfordeeMeter ID: [id number]       POC Glucose Once [[id number]]  (Normal) Collected:  04/12/18 1628    Specimen:  Blood Updated:  04/12/18 1650     Glucose 102 mg/dL      Comment: : [id number] Nico Measurement AnalyticseeMeter ID: [id number]       POC Glucose Once [[id number]]  (Abnormal) Collected:  04/12/18 1138    Specimen:  Blood Updated:  04/12/18 1215     Glucose 234 (H) mg/dL      Comment: : [id number] Nico KaleeMeter ID: [id number]       POC Glucose Once [[id number]]  (Abnormal) Collected:  04/12/18 0738    Specimen:  Blood Updated:  04/12/18 0749     Glucose 167 (H) mg/dL      Comment: : [id number] Nico Measurement AnalyticseeMeter ID: [id number]       Basic Metabolic Panel [[id number]]  (Abnormal) Collected:  04/12/18 0409    Specimen:  Blood Updated:  04/12/18 0509     Glucose 139 (H) mg/dL      BUN 26 (H) mg/dL      Creatinine 1.09 mg/dL      Sodium 136 mmol/L      Potassium 4.1 mmol/L      Chloride 100 mmol/L      CO2 26.0 mmol/L      Calcium 9.1 mg/dL      eGFR Non African Amer 65 mL/min/1.73      BUN/Creatinine Ratio 23.9     Anion Gap 10.0 mmol/L     Narrative:       The MDRD GFR formula is only valid for adults with stable renal function between ages 18 and 70.    CBC (No Diff) [[id number]]  (Abnormal) Collected:  04/12/18 0409    Specimen:  Blood Updated:  04/12/18 0503     WBC 7.03 10*3/mm3      RBC 3.39  (L) 10*6/mm3      Hemoglobin 10.0 (L) g/dL      Hematocrit 31.6 (L) %      MCV 93.2 fL      MCH 29.5 pg      MCHC 31.6 (L) g/dL      RDW 14.0 %      RDW-SD 47.0 fl      MPV 10.8 fL      Platelets 179 10*3/mm3     POC Glucose Once [[id number]]  (Abnormal) Collected:  04/11/18 2057    Specimen:  Blood Updated:  04/11/18 2109     Glucose 235 (H) mg/dL      Comment: : [id number] PherVoiceObjects SusanMeter ID: [id number]       Legionella Antigen, Urine - Urine, Urine, Clean Catch [[id number]]  (Normal) Collected:  04/11/18 1401    Specimen:  Urine from Urine, Clean Catch Updated:  04/11/18 1809     LEGIONELLA ANTIGEN, URINE Negative    S. Pneumo Ag Urine or CSF - Urine, Urine, Clean Catch [[id number]]  (Normal) Collected:  04/11/18 1401    Specimen:  Urine from Urine, Clean Catch Updated:  04/11/18 1809     Strep Pneumo Ag Negative    Hemoglobin A1c [[id number]] Collected:  04/11/18 1239    Specimen:  Blood Updated:  04/11/18 1737     Hemoglobin A1C 12.2 %     Narrative:       Less than 6.0           Non-Diabetic Range  6.0-7.0                 ADA Therapeutic Target  Greater than 7.0        Action Suggested    Urinalysis With / Culture If Indicated - Urine, Clean Catch [[id number]]  (Abnormal) Collected:  04/11/18 1401    Specimen:  Urine from Urine, Clean Catch Updated:  04/11/18 1413     Color, UA Dark Yellow (A)     Appearance, UA Clear     pH, UA <=5.0     Specific Gravity, UA >1.030 (H)     Glucose,  mg/dL (1+) (A)     Ketones, UA Trace (A)     Bilirubin, UA Small (1+) (A)     Blood, UA Negative     Protein, UA Trace (A)     Leuk Esterase, UA Negative     Nitrite, UA Negative     Urobilinogen, UA 1.0 E.U./dL    Narrative:       Urine microscopic not indicated.    Comprehensive Metabolic Panel [[id number]]  (Abnormal) Collected:  04/11/18 1330    Specimen:  Blood Updated:  04/11/18 1351     Glucose 113 (H) mg/dL      BUN 27 (H) mg/dL      Creatinine 1.15 mg/dL      Sodium 135 mmol/L      Potassium 4.3 mmol/L       Chloride 98 mmol/L      CO2 31.0 mmol/L      Calcium 9.2 mg/dL      Total Protein 6.2 (L) g/dL      Albumin 3.30 (L) g/dL      ALT (SGPT) 17 U/L      AST (SGOT) 25 U/L      Alkaline Phosphatase 70 U/L      Total Bilirubin 0.7 mg/dL      eGFR Non African Amer 61 mL/min/1.73      Globulin 2.9 gm/dL      A/G Ratio 1.1 g/dL      BUN/Creatinine Ratio 23.5     Anion Gap 6.0 mmol/L     Narrative:       The MDRD GFR formula is only valid for adults with stable renal function between ages 18 and 70.    CBC & Differential [[id number]] Collected:  04/11/18 1239    Specimen:  Blood Updated:  04/11/18 1303    Narrative:       The following orders were created for panel order CBC & Differential.  Procedure                               Abnormality         Status                     ---------                               -----------         ------                     CBC Auto Differential[[id number]]        Abnormal            Final result                 Please view results for these tests on the individual orders.    CBC Auto Differential [[id number]]  (Abnormal) Collected:  04/11/18 1239    Specimen:  Blood Updated:  04/11/18 1303     WBC 10.76 10*3/mm3      RBC 3.56 (L) 10*6/mm3      Hemoglobin 10.6 (L) g/dL      Hematocrit 33.0 (L) %      MCV 92.7 fL      MCH 29.8 pg      MCHC 32.1 (L) g/dL      RDW 13.9 %      RDW-SD 46.9 fl      MPV 10.6 fL      Platelets 210 10*3/mm3      Neutrophil % 81.1 (H) %      Lymphocyte % 10.3 (L) %      Monocyte % 6.6 %      Eosinophil % 0.7 %      Basophil % 0.5 %      Immature Grans % 0.8 %      Neutrophils, Absolute 8.73 (H) 10*3/mm3      Lymphocytes, Absolute 1.11 10*3/mm3      Monocytes, Absolute 0.71 10*3/mm3      Eosinophils, Absolute 0.07 10*3/mm3      Basophils, Absolute 0.05 10*3/mm3      Immature Grans, Absolute 0.09 (H) 10*3/mm3      nRBC 0.0 /100 WBC     Lactic Acid, Plasma [[id number]]  (Normal) Collected:  04/11/18 1239    Specimen:  Blood Updated:  04/11/18 1300     Lactate 1.4 mmol/L      POC Glucose Once [[id number]]  (Normal) Collected:  04/11/18 1233    Specimen:  Blood Updated:  04/11/18 1246     Glucose 105 mg/dL      Comment: : [id number] Poppy Rodriguez ID: [id number]                 Echo EF Estimated  Lab Results   Component Value Date    ECHOEFEST 30 04/12/2018         Cath Ejection Fraction Quantitative  No results found for: CATHEF        Medication Review: yes  Current Facility-Administered Medications   Medication Dose Route Frequency Provider Last Rate Last Dose   • acetaminophen (TYLENOL) tablet 650 mg  650 mg Oral Q4H PRN Cari Ayoub APRN       • amiodarone (PACERONE) tablet 200 mg  200 mg Oral Daily Cari Ayoub APRN   200 mg at 04/13/18 0932   • aspirin EC tablet 81 mg  81 mg Oral Daily Cari Ayoub APRN   81 mg at 04/13/18 0932   • atorvastatin (LIPITOR) tablet 40 mg  40 mg Oral Nightly Ishaan Gong MD   40 mg at 04/12/18 2019   • carvedilol (COREG) tablet 6.25 mg  6.25 mg Oral Q12H Ishaan Gong MD   6.25 mg at 04/13/18 0932   • clopidogrel (PLAVIX) tablet 75 mg  75 mg Oral Daily Cari Ayoub APRN   75 mg at 04/13/18 0933   • dextrose (D50W) solution 25 g  25 g Intravenous Q15 Min PRN ROCIO Hatch       • dextrose (GLUTOSE) oral gel 15 g  15 g Oral Q15 Min PRN Cari Ayoub APRN       • docusate sodium (COLACE) capsule 100 mg  100 mg Oral TID PRN ROCIO Hatch       • enoxaparin (LOVENOX) syringe 40 mg  40 mg Subcutaneous Q24H Cari Ayoub APRN   40 mg at 04/12/18 1727   • ferrous sulfate tablet 325 mg  325 mg Oral Daily With Breakfast ROCIO Hatch   325 mg at 04/13/18 0932   • folic acid (FOLVITE) tablet 2 mg  2 mg Oral Daily Cari Ayoub APRN   2 mg at 04/13/18 0933   • glucagon (human recombinant) (GLUCAGEN DIAGNOSTIC) injection 1 mg  1 mg Subcutaneous PRN Cari Ayoub APRN       • HYDROcodone-acetaminophen (NORCO) 5-325 MG per tablet 1 tablet  1 tablet Oral Q4H PRN Cari D  ROCIO Ayoub   1 tablet at 04/13/18 0139   • insulin lispro (humaLOG) injection 0-9 Units  0-9 Units Subcutaneous 4x Daily With Meals & Nightly ROCIO Hatch   2 Units at 04/13/18 1335   • ipratropium-albuterol (DUO-NEB) nebulizer solution 3 mL  3 mL Nebulization Q6H While Awake - RT ROCIO Hatch   3 mL at 04/13/18 1347   • midodrine (PROAMATINE) tablet 5 mg  5 mg Oral TID ROCIO Hatch   5 mg at 04/13/18 0933   • ondansetron (ZOFRAN) tablet 4 mg  4 mg Oral Q6H PRN ROCIO Hatch        Or   • ondansetron ODT (ZOFRAN-ODT) disintegrating tablet 4 mg  4 mg Oral Q6H PRN ROCIO Hatch        Or   • ondansetron (ZOFRAN) injection 4 mg  4 mg Intravenous Q6H PRN ROCIO Hatch       • pantoprazole (PROTONIX) EC tablet 40 mg  40 mg Oral QAM ROCIO Hatch   40 mg at 04/13/18 0935   • piperacillin-tazobactam (ZOSYN) 4.5 g in iso-osmotic dextrose 100 mL IVPB (premix)  4.5 g Intravenous Q8H ROCIO Hatch   4.5 g at 04/13/18 0935   • sodium chloride 0.9 % flush 1-10 mL  1-10 mL Intravenous PRN ROCIO Hatch       • sodium chloride 0.9 % flush 10 mL  10 mL Intravenous PRN Sonia Wong MD       • traMADol (ULTRAM) tablet 50 mg  50 mg Oral Q6H PRN ROCIO Hatch       • vancomycin 1250 mg/250 mL 0.9% NS IVPB ([facility])  1,250 mg Intravenous Q24H ROCIO Hatch   Stopped at 04/12/18 2035         Assessment/Plan     Principal Problem:    Healthcare-associated pneumonia  Active Problems:    Dehydration    Chronic systolic congestive heart failure    Shortness of breath    V-tach    Other hyperlipidemia    Type 2 diabetes mellitus    Dementia    Plan:  Blood Pressure remains stable  Patient refusing telemetry  Continue Low Salt Diet  Coreg started back  Will see PRN can follow up with regular cardiologist in [place]    Further recommendations per Dr. Giuseppe Amin, APRN  04/13/18  4:24 PM                  Electronically signed by  Fito Chin MD at 2018  5:18 PM          Physical Therapy Notes (last 72 hours) (Notes from 2018 12:21 PM through 2018 12:21 PM)      Cr Berry PTA at 4/15/2018 11:17 AM  Version 1 of 1         Problem: Patient Care Overview  Goal: Plan of Care Review  Outcome: Ongoing (interventions implemented as appropriate)   04/15/18 1034   Coping/Psychosocial   Plan of Care Reviewed With patient   Plan of Care Review   Progress improving   OTHER   Outcome Summary Patient's mobility has improved since his last session. He completed bed mobility with CGA/min A x 2 and completed sit to stands with min A x2. He ambulated 10' x 2 with roling walker and CGA x2 with one sittng rest. He does have severe forward flexed posture at his cervical spine. He continues to be at a higher risk of falls. We will continue to work on mobility, posture, and safety.           Electronically signed by Cr Berry PTA at 4/15/2018 11:17 AM     Cr Berry PTA at 4/15/2018 11:18 AM  Version 1 of 1         Acute Care - Physical Therapy Treatment Note  [facility]     Patient Name: Delon Cid  : 1935  MRN: [id number]  Today's Date: 4/15/2018  Onset of Illness/Injury or Date of Surgery: 18  Date of Referral to PT: 18  Referring Physician: ROCIO Reynolds    Admit Date: 2018    Visit Dx:    ICD-10-CM ICD-9-CM   1. Dehydration E86.0 276.51   2. Hypotension, unspecified hypotension type I95.9 458.9   3. Oropharyngeal dysphagia R13.12 787.22   4. Impaired functional mobility, balance, gait, and endurance Z74.09 V49.89   5. Impaired mobility and ADLs Z74.09 799.89     Patient Active Problem List   Diagnosis   • Dehydration   • Healthcare-associated pneumonia   • Chronic systolic congestive heart failure   • Shortness of breath   • V-tach   • Other hyperlipidemia   • Type 2 diabetes mellitus   • Dementia       Therapy Treatment          Rehabilitation Treatment Summary     Row Name 04/15/18  1034             Treatment Time/Intention    Discipline physical therapy assistant  -DWAYNE      Document Type therapy note (daily note)  -DWAYNE      Subjective Information complains of;pain  -JB2      Mode of Treatment physical therapy  -JB2      Patient Effort good  -JB2      Existing Precautions/Restrictions fall  -JB3      Recorded by [DWAYNE] Cr Berry, PTA 04/15/18 1035 04/15/18 1035  [JB2] Cr Berry, PTA 04/15/18 1113 04/15/18 1113  [JB3] Cr Berry, PTA 04/15/18 1042 04/15/18 1042      Row Name 04/15/18 1034             Bed Mobility Assessment/Treatment    Scooting/Bridging [place] (Bed Mobility) verbal cues;contact guard  -DWAYNE      Supine-Sit [place] (Bed Mobility) verbal cues;contact guard;minimum assist (75% patient effort);2 person assist  -DWAYNE      Sit-Supine [place] (Bed Mobility) verbal cues;contact guard;minimum assist (75% patient effort);2 person assist  -DWAYNE      Bed Mobility, Safety Issues decreased use of arms for pushing/pulling;decreased use of legs for bridging/pushing  -DWAYNE      Recorded by [DWAYNE] Cr Berry, PTA 04/15/18 1113 04/15/18 1113      Row Name 04/15/18 1034             Transfer Assessment/Treatment    Comment (Transfers) He needed cues to push off of bed and not pull on walker. He has decreased eccentric control during descent  -DWAYNE      Sit-Stand [place] (Transfers) verbal cues;minimum assist (75% patient effort);2 person assist  -DWAYNE      Stand-Sit [place] (Transfers) verbal cues;minimum assist (75% patient effort);2 person assist  -DWAYNE      Recorded by [DWAYNE] Cr Berry, PTA 04/15/18 1113 04/15/18 1113      Row Name 04/15/18 1034             Gait/Stairs Assessment/Training    [place] Level (Gait) verbal cues;contact guard;2 person assist  -DWAYNE      Assistive Device (Gait) walker, front-wheeled  -DWAYNE      Distance in Feet (Gait) 10   x2 with one sitting rest  -DWAYNE      Deviations/Abnormal Patterns (Gait) base of support, narrow;malcom  decreased   severe forward flexed posture  -DWAYNE      Bilateral Gait Deviations weight shift ability decreased  -DWAYNE      Comment (Gait/Stairs) needs cues to look up  -DWAYNE      Recorded by [DWAYNE] Cr Berry, PTA 04/15/18 1113 04/15/18 1113      Row Name 04/15/18 1034             Therapeutic Exercise    Lower Extremity (Therapeutic Exercise) LAQ (long arc quad), bilateral;marching while seated  -DWAYNE      Position (Therapeutic Exercise) seated  -DWAYNE      Sets/Reps (Therapeutic Exercise) 15  -DWAYNE      Recorded by [DWAYNE] rC Berry, PTA 04/15/18 1113 04/15/18 1113      Row Name 04/15/18 1034             Positioning and Restraints    Pre-Treatment Position in bed  -DWAYNE      Post Treatment Position --  -DWAYNE      In Bed fowlers;call light within reach;encouraged to call for assist;exit alarm on;with family/caregiver;side rails up x2  -DWAYNE      Recorded by [DWAYNE] Cr Berry, PTA 04/15/18 1113 04/15/18 1113      Row Name 04/15/18 1034             Pain Assessment    Additional Documentation Pain Scale: FACES Pre/Post-Treatment (Group)  -DWAYNE      Recorded by [DWAYNE] Cr Berry, PTA 04/15/18 1113 04/15/18 1113      Row Name 04/15/18 1034             Pain Scale: FACES Pre/Post-Treatment    Pain: FACES Scale, Pretreatment 0-->no hurt  -DWAYNE      Pain: FACES Scale, Post-Treatment 2-->hurts little bit  -DWAYNE      Pre/Post Treatment Pain Comment (R) shoulder   -DWAYNE      Recorded by [DWAYNE] Cr Berry, PTA 04/15/18 1113 04/15/18 1113      Row Name                Wound 04/11/18 1610 Left foot abrasion;blisters    Wound - Properties Group Date first assessed: 04/11/18 [KP] Time first assessed: 1610 [KP] Present On Admission : yes;picture taken [KP] Side: Left [KP] Location: foot [KP] Type: abrasion;blisters [KP] Recorded by:  [KP] Dana Ball RN 04/11/18 1711 04/11/18 1711    Row Name                Wound 04/11/18 1610 Bilateral upper gluteal    Wound - Properties Group Date first assessed: 04/11/18 [KP] Time first assessed: 1610  [KP] Present On Admission : yes;picture taken [KP] Side: Bilateral [KP] Orientation: upper [KP] Location: gluteal [KP] Stage, Pressure Injury: Stage 1 [KP] Recorded by:  [KP] Dana Ball RN 04/11/18 1712 04/11/18 1712      User Key  (r) = Recorded By, (t) = Taken By, (c) = Cosigned By    Initials Name Effective Dates Discipline     Dana Ball RN 08/02/16 -  Nurse    DWAYNE Berry PTA 08/02/16 -  PT          Wound 04/11/18 1610 Left foot abrasion;blisters (Active)   Dressing Appearance open to air 4/15/2018  8:37 AM   Drainage Amount none 4/15/2018  8:37 AM   Dressing Care, Wound open to air 4/15/2018  8:37 AM       Wound 04/11/18 1610 Bilateral upper gluteal (Active)   Dressing Appearance dry;intact 4/15/2018  8:37 AM   Dressing Care, Wound foam 4/15/2018  8:37 AM             Physical Therapy Education     Title: PT OT SLP Therapies (Active)     Topic: Physical Therapy (Active)     Point: Mobility training (Active)    Learning Progress Summary     Learner Status Readiness Method Response Comment Documented by    Patient Active Acceptance E NR Educated on posture DWAYNE 04/15/18 1114     Active Acceptance E NR Educated on purpose and benefits of therapy; bed mobility, sit <-> stand with RW; weight shifting to clear foot to take a step; discussion with pt and spouse on benefits of continued therapy at SNF after DC for increased safety at home LN 04/13/18 1601    Family Active Acceptance E NR Educated on purpose and benefits of therapy; bed mobility, sit <-> stand with RW; weight shifting to clear foot to take a step; discussion with pt and spouse on benefits of continued therapy at SNF after DC for increased safety at home LN 04/13/18 1601          Point: Body mechanics (Active)    Learning Progress Summary     Learner Status Readiness Method Response Comment Documented by    Patient Active Acceptance E NR Educated on purpose and benefits of therapy; bed mobility, sit <-> stand with RW; weight shifting to  clear foot to take a step; discussion with pt and spouse on benefits of continued therapy at SNF after DC for increased safety at home LN 04/13/18 1601    Family Active Acceptance E NR Educated on purpose and benefits of therapy; bed mobility, sit <-> stand with RW; weight shifting to clear foot to take a step; discussion with pt and spouse on benefits of continued therapy at SNF after DC for increased safety at home LN 04/13/18 1601          Point: Precautions (Active)    Learning Progress Summary     Learner Status Readiness Method Response Comment Documented by    Patient Active Acceptance E NR Educated on purpose and benefits of therapy; bed mobility, sit <-> stand with RW; weight shifting to clear foot to take a step; discussion with pt and spouse on benefits of continued therapy at SNF after DC for increased safety at home LN 04/13/18 1601    Family Active Acceptance E NR Educated on purpose and benefits of therapy; bed mobility, sit <-> stand with RW; weight shifting to clear foot to take a step; discussion with pt and spouse on benefits of continued therapy at SNF after DC for increased safety at home LN 04/13/18 1601                      User Key     Initials Effective Dates Name Provider Type Discipline     08/02/16 -  Cr Berry, PTA Physical Therapy Assistant PT    LN 01/08/18 -  Pankaj Saucedo, PT Student PT Student PT                    PT Recommendation and Plan     Plan of Care Reviewed With: patient  Progress: improving  Outcome Summary: Patient's mobility has improved since his last session.  He completed bed mobility with CGA/min A x 2 and completed sit to stands with min A x2.  He ambulated 10' x 2 with roling walker and CGA x2 with one sittng rest.  He does hhave severe forward flexed posture at his cervical spine.  He continues to be at a higher risk of falls.  We will continue to work on mobility, posture, and safety.          Outcome Measures     Row Name 04/15/18 1034 04/13/18 1600  04/13/18 1458       How much help from another person do you currently need...    Turning from your back to your side while in flat bed without using bedrails? 3  -DWAYNE  -- 3  -ELLIOTT (r) LN (t) ELLIOTT (c)    Moving from lying on back to sitting on the side of a flat bed without bedrails? 3  -DWAYNE  -- 2  -ELLIOTT (r) LN (t) ELLIOTT (c)    Moving to and from a bed to a chair (including a wheelchair)? 2  -DWAYNE  -- 2  -ELLIOTT (r) LN (t) ELLIOTT (c)    Standing up from a chair using your arms (e.g., wheelchair, bedside chair)? 2  -DWAYNE  -- 2  -ELLIOTT (r) LN (t) ELLIOTT (c)    Climbing 3-5 steps with a railing? 1  -DWAYNE  -- 1  -ELLIOTT (r) LN (t) ELLIOTT (c)    To walk in hospital room? 2  -DWAYNE  -- 2  -ELLIOTT (r) LN (t) ELLIOTT (c)    AM-PAC 6 Clicks Score 13  -DWAYNE  -- 12  -ELLIOTT (r) LN (t)       How much help from another is currently needed...    Putting on and taking off regular lower body clothing?  -- 2  -MM  --    Bathing (including washing, rinsing, and drying)  -- 2  -MM  --    Toileting (which includes using toilet bed pan or urinal)  -- 2  -MM  --    Putting on and taking off regular upper body clothing  -- 2  -MM  --    Taking care of personal grooming (such as brushing teeth)  -- 2  -MM  --    Eating meals  -- 2  -MM  --    Score  -- 12  -MM  --       Functional Assessment    Outcome Measure Options AM-PAC 6 Clicks Basic Mobility (PT)  -DWAYNE AM-PAC 6 Clicks Daily Activity (OT)  -MM AM-PAC 6 Clicks Basic Mobility (PT)  -ELLIOTT (r) LN (t) ELLIOTT (c)      User Key  (r) = Recorded By, (t) = Taken By, (c) = Cosigned By    Initials Name Provider Type    ELLIOTT Priest, PT DPT Physical Therapist    DWAYNE Berry, PTA Physical Therapy Assistant    MM Liu Gómez, OTR/L Occupational Therapist    RODOLFO Saucedo, PT Student PT Student           Time Calculation:         PT Charges     Row Name 04/15/18 1117             Time Calculation    Start Time 1034  -DWAYNE      Stop Time 1106  -DWAYNE      Time Calculation (min) 32 min  -DWAYNE      PT Received On 04/15/18  -DWAYNE      PT Goal Re-Cert Due  Date 18  -DWAYNE         Time Calculation- PT    Total Timed Code Minutes- PT 32 minute(s)  -DWAYNE        User Key  (r) = Recorded By, (t) = Taken By, (c) = Cosigned By    Initials Name Provider Type    DWAYNE Berry PTA Physical Therapy Assistant          Therapy Charges for Today     Code Description Service Date Service Provider Modifiers Qty    31987996906 HC GAIT TRAINING EA 15 MIN 4/15/2018 Cr Berry PTA GP, KX 1    83957137322 HC PT THER PROC EA 15 MIN 4/15/2018 Cr Berry PTA GP, KX 1          PT G-Codes  Outcome Measure Options: AM-PAC 6 Clicks Basic Mobility (PT)  Score: 12  Functional Limitation: Mobility: Walking and moving around  Mobility: Walking and Moving Around Current Status (): At least 60 percent but less than 80 percent impaired, limited or restricted  Mobility: Walking and Moving Around Goal Status (): At least 40 percent but less than 60 percent impaired, limited or restricted    Cr Berry PTA  4/15/2018         Electronically signed by Cr Berry PTA at 4/15/2018 11:18 AM          Occupational Therapy Notes (last 72 hours) (Notes from 2018 12:21 PM through 2018 12:21 PM)      Liu Gómez OTR/L at 2018  4:28 PM          Acute Care - Occupational Therapy Initial Evaluation  [facility]     Patient Name: Delon Cid  : 1935  MRN: [id number]  Today's Date: 2018  Onset of Illness/Injury or Date of Surgery: 18  Date of Referral to OT: 18  Referring Physician: ROCIO Reynolds    Admit Date: 2018       ICD-10-CM ICD-9-CM   1. Dehydration E86.0 276.51   2. Hypotension, unspecified hypotension type I95.9 458.9   3. Oropharyngeal dysphagia R13.12 787.22   4. Impaired functional mobility, balance, gait, and endurance Z74.09 V49.89   5. Impaired mobility and ADLs Z74.09 799.89     Patient Active Problem List   Diagnosis   • Dehydration   • Healthcare-associated pneumonia   • Chronic systolic  congestive heart failure   • Shortness of breath   • V-tach   • Other hyperlipidemia   • Type 2 diabetes mellitus   • Dementia     Past Medical History:   Diagnosis Date   • CAD (coronary artery disease)    • Cardiomyopathy    • Chronic systolic CHF (congestive heart failure)    • Dementia    • Hypertension    • Insulin dependent diabetes mellitus    • Myocardial infarction    • Stroke    • Sustained VT (ventricular tachycardia)      Past Surgical History:   Procedure Laterality Date   • BACK SURGERY     • CARDIAC DEFIBRILLATOR PLACEMENT     • CAROTID ENDARTERECTOMY Right    • CATARACT EXTRACTION, BILATERAL     • CORONARY ARTERY BYPASS GRAFT     • KYPHOPLASTY            OT ASSESSMENT FLOWSHEET (last 72 hours)      Occupational Therapy Evaluation     Row Name 04/13/18 1438                   OT Evaluation Time/Intention    Subjective Information complains of;pain  -MM        Document Type evaluation   see MAR  -MM        Mode of Treatment occupational therapy  -MM        Patient Effort adequate  -MM        Symptoms Noted During/After Treatment fatigue  -MM           General Information    Patient Profile Reviewed? yes  -MM        Onset of Illness/Injury or Date of Surgery 04/11/18  -MM        Referring Physician ROCIO Reynolds  -MM        Patient Observations alert;cooperative;agree to therapy  -MM        Patient/Family Observations spouse present  -MM        General Observations of Patient fowlers in bed  -MM        Prior Level of Function min assist:;bed mobility;gait;transfer  -MM        Equipment Currently Used at Home cane, quad;walker, rolling;shower chair;commode, bedside  -MM        Pertinent History of Current Functional Problem Pt recently seen at [facility] for spinal fx and kyphoplasty after fall. Pt was at SNF for 2 days afterward before leaving d/t desire to return home. Pt had multiple falls at home, spouse took him to PCP where low blood pressure was noted. Chest Xray 4/11  noted bilateral lower lobe infiltrates/pneumonia.  -MM        Existing Precautions/Restrictions fall  -MM        Limitations/Impairments safety/cognitive  -MM        Risks Reviewed patient:;spouse/S.O.:;LOB;nausea/vomiting;dizziness;increased discomfort;change in vital signs;increased drainage;lines disloged  -MM        Benefits Reviewed patient:;spouse/S.O.:;improve function;increase independence;increase strength;increase balance;decrease pain;decrease risk of DVT;improve skin integrity;increase knowledge  -MM        Barriers to Rehab previous functional deficit;medically complex  -MM           Relationship/Environment    Primary Source of Support/Comfort spouse  -MM        Lives With spouse  -MM           Resource/Environmental Concerns    Current Living Arrangements home/apartment/condo  -MM           Home Main Entrance    Number of Stairs, Main Entrance one  -MM           Cognitive Assessment/Interventions    Additional Documentation Cognitive Assessment/Intervention (Group)  -MM           Cognitive Assessment/Intervention- PT/OT    Affect/Mental Status (Cognitive) flat/blunted affect;confused  -MM        Orientation Status (Cognition) oriented to;person;unable/difficult to assess  -MM        Follows Commands (Cognition) follows one step commands;25-49% accuracy;physical/tactile prompts required;repetition of directions required;verbal cues/prompting required  -MM        Cognitive Function (Cognitive) safety deficit;memory deficit  -MM        Safety Deficit (Cognitive) severe deficit;safety precautions awareness;safety precautions follow-through/compliance;insight into deficits/self awareness;awareness of need for assistance;ability to follow commands  -MM        Personal Safety Interventions fall prevention program maintained;gait belt;nonskid shoes/slippers when out of bed;supervised activity  -MM           Safety Issues, Functional Mobility    Safety Issues Affecting Function (Mobility) safety precaution  awareness;safety precautions follow-through/compliance;insight into deficits/self awareness;awareness of need for assistance;ability to follow commands  -MM        Impairments Affecting Function (Mobility) strength;balance;cognition;endurance/activity tolerance;motor control;pain;postural/trunk control  -MM           Bed Mobility Assessment/Treatment    Bed Mobility Assessment/Treatment scooting/bridging;supine-sit;sit-supine  -MM        Scooting/Bridging [place] (Bed Mobility) maximum assist (25% patient effort);2 person assist;verbal cues  -MM        Supine-Sit [place] (Bed Mobility) moderate assist (50% patient effort);2 person assist;verbal cues;nonverbal cues (demo/gesture)  -MM        Sit-Supine [place] (Bed Mobility) moderate assist (50% patient effort);2 person assist;verbal cues;nonverbal cues (demo/gesture)  -MM        Bed Mobility, Safety Issues decreased use of arms for pushing/pulling;decreased use of legs for bridging/pushing;impaired trunk control for bed mobility  -MM        Assistive Device (Bed Mobility) bed rails;draw sheet;head of bed elevated  -MM           Transfer Assessment/Treatment    Transfer Assessment/Treatment sit-stand transfer;stand-sit transfer  -MM        Comment (Transfers) Decreased WB through LLE, L foot in slight equinovarus position during static stance  -MM        Sit-Stand [place] (Transfers) moderate assist (50% patient effort);2 person assist;verbal cues;nonverbal cues (demo/gesture)  -MM        Stand-Sit [place] (Transfers) moderate assist (50% patient effort);2 person assist;verbal cues;nonverbal cues (demo/gesture)  -MM           Sit-Stand Transfer    Assistive Device (Sit-Stand Transfers) walker, front-wheeled  -MM           Stand-Sit Transfer    Assistive Device (Stand-Sit Transfers) walker, front-wheeled  -MM           ADL Assessment/Intervention    BADL Assessment/Intervention lower body dressing  -MM           Lower Body Dressing  Assessment/Training    Lower Body Dressing [place] Level don;socks;maximum assist (25% patient effort)  -MM        Lower Body Dressing Position edge of bed sitting  -MM           BADL Safety/Performance    Impairments, BADL Safety/Performance balance;cognition;strength;pain  -MM           General ROM    GENERAL ROM COMMENTS BUE AROM WFL, L HAND 50-75% IMPAIRED  -MM           General Assessment (Manual Muscle Testing)    Comment, General Manual Muscle Testing (MMT) Assessment RUE 4-/5, LUE 3+/5  -MM           Balance    Balance static sitting balance;static standing balance  -MM           Static Sitting Balance    Level of [place] (Unsupported Sitting, Static Balance) minimal assist, 75% patient effort   posterior R lean  -MM        Sitting Position (Unsupported Sitting, Static Balance) sitting on edge of bed  -MM           Static Standing Balance    Level of [place] (Supported Standing, Static Balance) moderate assist, 50 to 74% patient effort  -MM           Sensory Assessment/Intervention    Sensory General Assessment no sensation deficits identified  -MM           Positioning and Restraints    Pre-Treatment Position in bed  -MM        Post Treatment Position bed  -MM        In Bed notified nsg;fowlers;call light within reach;encouraged to call for assist;exit alarm on;with family/caregiver;side rails up x2  -MM           Pain Scale: FACES Pre/Post-Treatment    Pain: FACES Scale, Pretreatment 0-->no hurt  -MM        Pain: FACES Scale, Post-Treatment 2-->hurts little bit  -MM           Wound 04/11/18 1610 Left foot abrasion;blisters    Wound - Properties Group Date first assessed: 04/11/18  - Time first assessed: 1610 -KP Present On Admission : yes;picture taken  - Side: Left  - Location: foot  -KP Type: abrasion;blisters  -KP       Wound 04/11/18 1610 Bilateral upper gluteal    Wound - Properties Group Date first assessed: 04/11/18  -KP Time first assessed: 1610  -KP Present On Admission :  yes;picture taken  -KP Side: Bilateral  -KP Orientation: upper  - Location: gluteal  -KP Stage, Pressure Injury: Stage 1  -KP       Plan of Care Review    Plan of Care Reviewed With patient  -MM           Clinical Impression (OT)    Date of Referral to OT 04/11/18  -MM        OT Diagnosis impaired mobility and adls  -MM        Patient/Family Goals Statement (OT Eval) return home  -MM        Criteria for Skilled Therapeutic Interventions Met (OT Eval) yes;treatment indicated  -MM        Rehab Potential (OT Eval) good, to achieve stated therapy goals  -MM        Therapy Frequency (OT Eval) 3 times/wk  -MM        Predicted Duration of Therapy Intervention (OT Eval) until d/c  -MM        Care Plan Review (OT) evaluation/treatment results reviewed;risks/benefits reviewed;care plan/treatment goals reviewed;current/potential barriers reviewed;patient/other agree to care plan  -MM        Care Plan Review, Other Participant (OT Eval) spouse  -MM        Anticipated Discharge Disposition (OT) skilled nursing facility (SNF)  -MM           Planned OT Interventions    Planned Therapy Interventions (OT Eval) activity tolerance training;adaptive equipment training;BADL retraining;cognitive/visual perception retraining;functional balance retraining;neuromuscular control/coordination retraining;occupation/activity based interventions;patient/caregiver education/training;ROM/therapeutic exercise;strengthening exercise;transfer/mobility retraining  -MM           OT Goals    Bed Mobility Goal Selection (OT) bed mobility, OT goal 1  -MM        Transfer Goal Selection (OT) transfer, OT goal 1  -MM        Grooming Goal Selection (OT) grooming, OT goal 1  -MM        Additional Documentation Grooming Goal Selection (OT) (Row)  -MM           Bed Mobility Goal 1 (OT)    Activity/Assistive Device (Bed Mobility Goal 1, OT) bed mobility activities, all  -MM        [place] Level/Cues Needed (Bed Mobility Goal 1, OT) contact guard assist   -MM        Time Frame (Bed Mobility Goal 1, OT) 10 days  -MM        Progress/Outcomes (Bed Mobility Goal 1, OT) goal ongoing  -MM           Transfer Goal 1 (OT)    Activity/Assistive Device (Transfer Goal 1, OT) transfers, all  -MM        [place] Level/Cues Needed (Transfer Goal 1, OT) contact guard assist  -MM        Time Frame (Transfer Goal 1, OT) 10 days  -MM        Progress/Outcome (Transfer Goal 1, OT) goal ongoing  -MM           Grooming Goal 1 (OT)    Activity/Device (Grooming Goal 1, OT) grooming skills, all  -MM        [place] (Grooming Goal 1, OT) set-up required;supervision required   in sitting  -MM        Time Frame (Grooming Goal 1, OT) 10 days  -MM        Progress/Outcome (Grooming Goal 1, OT) goal ongoing  -MM           Living Environment    Home Accessibility stairs to enter home  -MM          User Key  (r) = Recorded By, (t) = Taken By, (c) = Cosigned By    Initials Name Effective Dates    WHIT Ball RN 08/02/16 -     MM SOFI Green/FLORENCIA 04/03/18 -            Occupational Therapy Education     Title: PT OT SLP Therapies (Active)     Topic: Occupational Therapy (Active)     Point: ADL training (Active)     Description: Instruct learner(s) on proper safety adaptation and remediation techniques during self care or transfers.   Instruct in proper use of assistive devices.   Learning Progress Summary     Learner Status Readiness Method Response Comment Documented by    Patient Active Acceptance E NR [facility] role[facility] 04/13[facility]    Significant Other Active Acceptance E NR OT role, benefits[facility] 04/[facility]                      User Key     Initials Effective Dates Name Provider Type Discipline     04/03/18 -  Liu Gómez OTR/L Occupational Therapist OT                  OT Recommendation and Plan  Outcome Summary/Treatment Plan (OT)  Anticipated Discharge Disposition (OT): skilled nursing facility (SNF)  Planned Therapy  Interventions (OT Eval): activity tolerance training, adaptive equipment training, BADL retraining, cognitive/visual perception retraining, functional balance retraining, neuromuscular control/coordination retraining, occupation/activity based interventions, patient/caregiver education/training, ROM/therapeutic exercise, strengthening exercise, transfer/mobility retraining  Therapy Frequency (OT Eval): 3 times/wk  Plan of Care Review  Plan of Care Reviewed With: patient  Plan of Care Reviewed With: patient  Outcome Summary: OT eval completed. pt was mod x2 for bed mobility. Pt was mod x2 for sit to stand with RW. Pt had significant posterior and R lean. Pt was max A to nancy socks.  pt would benefit from skilled Ot to address adls, functional mobility, safety, d/c planning and cognition. Pt is a high fall risk and not safe to d/c home at this time. Recommended d/c SNF.          Outcome Measures     Row Name 04/13/18 1600 04/13/18 4230          How much help from another person do you currently need...    Turning from your back to your side while in flat bed without using bedrails?  -- 3  -ELLIOTT (r) LN (t) ELLIOTT (c)     Moving from lying on back to sitting on the side of a flat bed without bedrails?  -- 2  -ELLIOTT (r) LN (t) ELLIOTT (c)     Moving to and from a bed to a chair (including a wheelchair)?  -- 2  -ELLIOTT (r) LN (t) ELLIOTT (c)     Standing up from a chair using your arms (e.g., wheelchair, bedside chair)?  -- 2  -ELLIOTT (r) LN (t) ELLIOTT (c)     Climbing 3-5 steps with a railing?  -- 1  -ELLIOTT (r) LN (t) ELLIOTT (c)     To walk in hospital room?  -- 2  -ELLIOTT (r) LN (t) ELLIOTT (c)     AM-PAC 6 Clicks Score  -- 12  -ELLIOTT (r) LN (t)        How much help from another is currently needed...    Putting on and taking off regular lower body clothing? 2  -MM  --     Bathing (including washing, rinsing, and drying) 2  -MM  --     Toileting (which includes using toilet bed pan or urinal) 2  -MM  --     Putting on and taking off regular upper body clothing 2  -MM   --     Taking care of personal grooming (such as brushing teeth) 2  -MM  --     Eating meals 2  -MM  --     Score 12  -MM  --        Functional Assessment    Outcome Measure Options AM-PAC 6 Clicks Daily Activity (OT)  -MM AM-PAC 6 Clicks Basic Mobility (PT)  -ELLIOTT (r) LN (t) ELLIOTT (c)       User Key  (r) = Recorded By, (t) = Taken By, (c) = Cosigned By    Initials Name Provider Type    ELLIOTT Priest, PT DPT Physical Therapist    MM Liu Gómez, OTR/L Occupational Therapist    LN Pankaj Saucedo, PT Student PT Student          Time Calculation:   OT Start Time: 1438  OT Stop Time: 1538  OT Time Calculation (min): 60 min    Therapy Charges for Today     Code Description Service Date Service Provider Modifiers Qty    35304003092  OT SELFCARE CURRENT 4/13/2018 Liu Gómez OTR/L GO, CL 1    03420740577 HC OT SELFCARE PROJECTED 4/13/2018 Liu Gómez OTR/L GO, CK 1    63026305110 HC OT EVAL MOD COMPLEXITY 4 4/13/2018 Liu Gómez OTR/L GO, KX 1          OT G-codes  OT Professional Judgement Used?: Yes  OT Functional Scales Options: AM-PAC 6 Clicks Daily Activity (OT)  Score: 12  Functional Limitation: Self care  Self Care Current Status (): At least 60 percent but less than 80 percent impaired, limited or restricted  Self Care Goal Status (): At least 40 percent but less than 60 percent impaired, limited or restricted    Liu Gómez OTR/L  4/13/2018    Electronically signed by Liu Gómez OTR/L at 4/13/2018  4:28 PM     Liu Gómez OTR/L at 4/13/2018  4:26 PM          Problem: Patient Care Overview  Goal: Plan of Care Review  Outcome: Ongoing (interventions implemented as appropriate)   04/13/18 1624   Coping/Psychosocial   Plan of Care Reviewed With patient   Plan of Care Review   Progress declining   OTHER   Outcome Summary OT eval completed. pt was mod x2 for bed mobility. Pt was mod x2 for sit to stand with RW. Pt had significant posterior and R lean. Pt was max A to  nancy socks. pt would benefit from skilled Ot to address adls, functional mobility, safety, d/c planning and cognition. Pt is a high fall risk and not safe to d/c home at this time. Recommended d/c SNF.           Electronically signed by Liu Gómez OTR/L at 4/13/2018  4:26 PM

## 2018-04-16 NOTE — PLAN OF CARE
Problem: Patient Care Overview  Goal: Plan of Care Review  Outcome: Ongoing (interventions implemented as appropriate)   04/16/18 1119   Coping/Psychosocial   Plan of Care Reviewed With patient;spouse   Plan of Care Review   Progress no change   OTHER   Outcome Summary Swallowing therapy completed. Pt alert sitting upright in bed. Upon entering the room pt was consuming thin liquid, coffee. Pt education completed on the importance of thicken liquids. Education completed on pt taking meds with thickened liquids/ pudding/ applesauce. Also, education on the importance of oral care was provided to the pt and spouse. Pt completed trials of honey thick without any overt s/s of aspiration. Pt completed labial excersises, 4/5 for 3 sets at 70% accuracy, given modeling and verbal cues. Pt completed lingual exersises (protursion, laterilzation and ROM) 2/2 for 3 sets, at 70% accuracy given modleing and verbal cues. Pt completed effortful swallow 2/2 for 3 sets with 60% accuracy, given modeling and verbal cues. Pt attempteded derrek x2 however pt was unable to accuractly complete. Pt reuqired constant redirection and prompts to complete exersises. Pt exhibited decreased sustained attention throughout the therapy session. SLP will continue to follow and treat.

## 2018-04-16 NOTE — PROGRESS NOTES
Continued Stay Note  [facility]     Patient Name: Delon Cid  MRN: [id number]  Today's Date: 4/16/2018    Admit Date: 4/11/2018          Discharge Plan     Row Name 04/16/18 1225       Plan    Plan PT's granddaughter has come by to see HOLLY and asking about bed offers. HOLLY has advised that [facility] and [facility] have both offered and [facility] has declined a bed offer. SW has not yet heard back from [facility]. PT's granddaughter has refused the bed at [facility], as there is an employee who works there that the family does not want involved with PT. PT's granddaughter has now requested that PT be listed with [facility]. PT's spouse did not want PT listed with [facility] as of Friday, but they have now changed their minds. SW has listed with [facility]. Will await bed offer or denial. SW is also awaiting a return call from [facility].     Patient/Family in Agreement with Plan yes              Discharge Codes    No documentation.           NEELA Escalante

## 2018-04-16 NOTE — PROGRESS NOTES
Continued Stay Note  [facility]     Patient Name: Delon Cid  MRN: [id number]  Today's Date: 4/16/2018    Admit Date: 4/11/2018          Discharge Plan     Row Name 04/16/18 7746       Plan    Plan PT has a bed offer with [facility] and awaiting offer or denial from [facility]. PT has been denied by other facilities or family has refused them. Will continue to follow. PT's granddaughter [facility] would like to be notified of bed offers and will assist PT and spouse with decision making. Her number is [phone].     Patient/Family in Agreement with Plan yes    Row Name 04/16/18 7498       Plan    Plan PT's granddaughter has come by to see HOLLY and asking about bed offers. HOLLY has advised that [facility] and [facility] have both offered and [facility] has declined a bed offer. HOLLY has not yet heard back from [facility]. PT's granddaughter has refused the bed at [facility], as there is an employee who works there that the family does not want involved with PT. PT's granddaughter has now requested that PT be listed with [facility]. PT's spouse did not want PT listed with [facility] as of Friday, but they have now changed their minds. SW has listed with [facility]. Will await bed offer or denial. HOLLY is also awaiting a return call from [facility].     Patient/Family in Agreement with Plan yes              Discharge Codes    No documentation.           NEELA Escalante     Upper Respiratory Infection (Cold): Care Instructions  Your Care Instructions    An upper respiratory infection, or URI, is an infection of the nose, sinuses, or throat. URIs are spread by coughs, sneezes, and direct contact. The common cold is the most frequent kind of URI. The flu and sinus infections are other kinds of URIs. Almost all URIs are caused by viruses. Antibiotics won't cure them. But you can treat most infections with home care. This may include drinking lots of fluids and taking over-the-counter pain medicine. You will probably feel better in 4 to 10 days. The doctor has checked you carefully, but problems can develop later. If you notice any problems or new symptoms, get medical treatment right away. Follow-up care is a key part of your treatment and safety. Be sure to make and go to all appointments, and call your doctor if you are having problems. It's also a good idea to know your test results and keep a list of the medicines you take. How can you care for yourself at home? · To prevent dehydration, drink plenty of fluids, enough so that your urine is light yellow or clear like water. Choose water and other caffeine-free clear liquids until you feel better. If you have kidney, heart, or liver disease and have to limit fluids, talk with your doctor before you increase the amount of fluids you drink. · Take an over-the-counter pain medicine, such as acetaminophen (Tylenol), ibuprofen (Advil, Motrin), or naproxen (Aleve). Read and follow all instructions on the label. · Before you use cough and cold medicines, check the label. These medicines may not be safe for young children or for people with certain health problems. · Be careful when taking over-the-counter cold or flu medicines and Tylenol at the same time. Many of these medicines have acetaminophen, which is Tylenol. Read the labels to make sure that you are not taking more than the recommended dose.  Too much acetaminophen (Tylenol) can be harmful. · Get plenty of rest.  · Do not smoke or allow others to smoke around you. If you need help quitting, talk to your doctor about stop-smoking programs and medicines. These can increase your chances of quitting for good. When should you call for help? Call 911 anytime you think you may need emergency care. For example, call if:  ? · You have severe trouble breathing. ?Call your doctor now or seek immediate medical care if:  ? · You seem to be getting much sicker. ? · You have new or worse trouble breathing. ? · You have a new or higher fever. ? · You have a new rash. ? Watch closely for changes in your health, and be sure to contact your doctor if:  ? · You have a new symptom, such as a sore throat, an earache, or sinus pain. ? · You cough more deeply or more often, especially if you notice more mucus or a change in the color of your mucus. ? · You do not get better as expected. Where can you learn more? Go to http://anil-shade.info/. Enter O023 in the search box to learn more about \"Upper Respiratory Infection (Cold): Care Instructions. \"  Current as of: May 12, 2017  Content Version: 11.4  © 2750-0376 [employer]. Care instructions adapted under license by [employer] (which disclaims liability or warranty for this information). If you have questions about a medical condition or this instruction, always ask your healthcare professional. [place] [address] any warranty or liability for your use of this information.

## 2018-04-17 LAB
ALBUMIN SERPL-MCNC: 3.3 G/DL (ref 3.5–5)
ALBUMIN/GLOB SERPL: 1.2 G/DL (ref 1.1–2.5)
ALP SERPL-CCNC: 86 U/L (ref 24–120)
ALT SERPL W P-5'-P-CCNC: 35 U/L (ref 0–54)
ANION GAP SERPL CALCULATED.3IONS-SCNC: 9 MMOL/L (ref 4–13)
AST SERPL-CCNC: 25 U/L (ref 7–45)
BASOPHILS # BLD AUTO: 0.02 10*3/MM3 (ref 0–0.2)
BASOPHILS NFR BLD AUTO: 0.4 % (ref 0–2)
BILIRUB SERPL-MCNC: 0.6 MG/DL (ref 0.1–1)
BUN BLD-MCNC: 9 MG/DL (ref 5–21)
BUN/CREAT SERPL: 11.3 (ref 7–25)
CALCIUM SPEC-SCNC: 8.8 MG/DL (ref 8.4–10.4)
CHLORIDE SERPL-SCNC: 100 MMOL/L (ref 98–110)
CO2 SERPL-SCNC: 30 MMOL/L (ref 24–31)
CREAT BLD-MCNC: 0.8 MG/DL (ref 0.5–1.4)
DEPRECATED RDW RBC AUTO: 45.7 FL (ref 40–54)
EOSINOPHIL # BLD AUTO: 0.06 10*3/MM3 (ref 0–0.7)
EOSINOPHIL NFR BLD AUTO: 1.3 % (ref 0–4)
ERYTHROCYTE [DISTWIDTH] IN BLOOD BY AUTOMATED COUNT: 13.8 % (ref 12–15)
GFR SERPL CREATININE-BSD FRML MDRD: 93 ML/MIN/1.73
GLOBULIN UR ELPH-MCNC: 2.8 GM/DL
GLUCOSE BLD-MCNC: 171 MG/DL (ref 70–100)
GLUCOSE BLDC GLUCOMTR-MCNC: 128 MG/DL (ref 70–130)
GLUCOSE BLDC GLUCOMTR-MCNC: 214 MG/DL (ref 70–130)
GLUCOSE BLDC GLUCOMTR-MCNC: 260 MG/DL (ref 70–130)
GLUCOSE BLDC GLUCOMTR-MCNC: 299 MG/DL (ref 70–130)
HCT VFR BLD AUTO: 33.1 % (ref 40–52)
HGB BLD-MCNC: 10.8 G/DL (ref 14–18)
IMM GRANULOCYTES # BLD: 0.02 10*3/MM3 (ref 0–0.03)
IMM GRANULOCYTES NFR BLD: 0.4 % (ref 0–5)
LYMPHOCYTES # BLD AUTO: 1.19 10*3/MM3 (ref 0.72–4.86)
LYMPHOCYTES NFR BLD AUTO: 25.8 % (ref 15–45)
MCH RBC QN AUTO: 29.6 PG (ref 28–32)
MCHC RBC AUTO-ENTMCNC: 32.6 G/DL (ref 33–36)
MCV RBC AUTO: 90.7 FL (ref 82–95)
MONOCYTES # BLD AUTO: 0.51 10*3/MM3 (ref 0.19–1.3)
MONOCYTES NFR BLD AUTO: 11 % (ref 4–12)
NEUTROPHILS # BLD AUTO: 2.82 10*3/MM3 (ref 1.87–8.4)
NEUTROPHILS NFR BLD AUTO: 61.1 % (ref 39–78)
NRBC BLD MANUAL-RTO: 0 /100 WBC (ref 0–0)
PLATELET # BLD AUTO: 186 10*3/MM3 (ref 130–400)
PMV BLD AUTO: 11 FL (ref 6–12)
POTASSIUM BLD-SCNC: 3.4 MMOL/L (ref 3.5–5.3)
PROT SERPL-MCNC: 6.1 G/DL (ref 6.3–8.7)
RBC # BLD AUTO: 3.65 10*6/MM3 (ref 4.8–5.9)
SODIUM BLD-SCNC: 139 MMOL/L (ref 135–145)
WBC NRBC COR # BLD: 4.62 10*3/MM3 (ref 4.8–10.8)

## 2018-04-17 PROCEDURE — 25010000002 ENOXAPARIN PER 10 MG: Performed by: NURSE PRACTITIONER

## 2018-04-17 PROCEDURE — 63710000001 INSULIN LISPRO (HUMAN) PER 5 UNITS: Performed by: NURSE PRACTITIONER

## 2018-04-17 PROCEDURE — 94760 N-INVAS EAR/PLS OXIMETRY 1: CPT

## 2018-04-17 PROCEDURE — 94799 UNLISTED PULMONARY SVC/PX: CPT

## 2018-04-17 PROCEDURE — 82962 GLUCOSE BLOOD TEST: CPT

## 2018-04-17 PROCEDURE — 97116 GAIT TRAINING THERAPY: CPT

## 2018-04-17 PROCEDURE — 85025 COMPLETE CBC W/AUTO DIFF WBC: CPT | Performed by: FAMILY MEDICINE

## 2018-04-17 PROCEDURE — 92526 ORAL FUNCTION THERAPY: CPT

## 2018-04-17 PROCEDURE — 80053 COMPREHEN METABOLIC PANEL: CPT | Performed by: FAMILY MEDICINE

## 2018-04-17 PROCEDURE — 97110 THERAPEUTIC EXERCISES: CPT

## 2018-04-17 PROCEDURE — 99232 SBSQ HOSP IP/OBS MODERATE 35: CPT | Performed by: INTERNAL MEDICINE

## 2018-04-17 RX ORDER — POTASSIUM CHLORIDE 750 MG/1
40 CAPSULE, EXTENDED RELEASE ORAL ONCE
Status: COMPLETED | OUTPATIENT
Start: 2018-04-17 | End: 2018-04-17

## 2018-04-17 RX ADMIN — IPRATROPIUM BROMIDE AND ALBUTEROL SULFATE 3 ML: 2.5; .5 SOLUTION RESPIRATORY (INHALATION) at 18:44

## 2018-04-17 RX ADMIN — MIDODRINE HYDROCHLORIDE 5 MG: 2.5 TABLET ORAL at 10:15

## 2018-04-17 RX ADMIN — AMOXICILLIN AND CLAVULANATE POTASSIUM 1 TABLET: 875; 125 TABLET, FILM COATED ORAL at 10:14

## 2018-04-17 RX ADMIN — POTASSIUM CHLORIDE 40 MEQ: 750 CAPSULE, EXTENDED RELEASE ORAL at 18:14

## 2018-04-17 RX ADMIN — MIDODRINE HYDROCHLORIDE 5 MG: 2.5 TABLET ORAL at 20:01

## 2018-04-17 RX ADMIN — FOLIC ACID 2 MG: 1 TABLET ORAL at 10:13

## 2018-04-17 RX ADMIN — PANTOPRAZOLE SODIUM 40 MG: 40 TABLET, DELAYED RELEASE ORAL at 06:14

## 2018-04-17 RX ADMIN — ENOXAPARIN SODIUM 40 MG: 40 INJECTION SUBCUTANEOUS at 18:14

## 2018-04-17 RX ADMIN — GUAIFENESIN 1200 MG: 600 TABLET, EXTENDED RELEASE ORAL at 20:01

## 2018-04-17 RX ADMIN — AMIODARONE HYDROCHLORIDE 200 MG: 200 TABLET ORAL at 10:14

## 2018-04-17 RX ADMIN — CARVEDILOL 6.25 MG: 6.25 TABLET, FILM COATED ORAL at 10:14

## 2018-04-17 RX ADMIN — INSULIN LISPRO 4 UNITS: 100 INJECTION, SOLUTION INTRAVENOUS; SUBCUTANEOUS at 10:15

## 2018-04-17 RX ADMIN — AMOXICILLIN AND CLAVULANATE POTASSIUM 1 TABLET: 875; 125 TABLET, FILM COATED ORAL at 20:01

## 2018-04-17 RX ADMIN — GUAIFENESIN 1200 MG: 600 TABLET, EXTENDED RELEASE ORAL at 10:15

## 2018-04-17 RX ADMIN — ASPIRIN 81 MG: 81 TABLET ORAL at 10:14

## 2018-04-17 RX ADMIN — INSULIN LISPRO 6 UNITS: 100 INJECTION, SOLUTION INTRAVENOUS; SUBCUTANEOUS at 20:09

## 2018-04-17 RX ADMIN — LEVOTHYROXINE SODIUM 75 MCG: 75 TABLET ORAL at 06:14

## 2018-04-17 RX ADMIN — MIDODRINE HYDROCHLORIDE 5 MG: 2.5 TABLET ORAL at 18:14

## 2018-04-17 RX ADMIN — INSULIN LISPRO 6 UNITS: 100 INJECTION, SOLUTION INTRAVENOUS; SUBCUTANEOUS at 12:59

## 2018-04-17 RX ADMIN — DESMOPRESSIN ACETATE 40 MG: 0.2 TABLET ORAL at 20:01

## 2018-04-17 RX ADMIN — CARVEDILOL 6.25 MG: 6.25 TABLET, FILM COATED ORAL at 20:01

## 2018-04-17 RX ADMIN — CLOPIDOGREL 75 MG: 75 TABLET, FILM COATED ORAL at 10:14

## 2018-04-17 RX ADMIN — IPRATROPIUM BROMIDE AND ALBUTEROL SULFATE 3 ML: 2.5; .5 SOLUTION RESPIRATORY (INHALATION) at 11:48

## 2018-04-17 RX ADMIN — FERROUS SULFATE TAB 325 MG (65 MG ELEMENTAL FE) 325 MG: 325 (65 FE) TAB at 10:14

## 2018-04-17 RX ADMIN — IPRATROPIUM BROMIDE AND ALBUTEROL SULFATE 3 ML: 2.5; .5 SOLUTION RESPIRATORY (INHALATION) at 06:52

## 2018-04-17 NOTE — PROGRESS NOTES
[facility]  INPATIENT PROGRESS NOTE    Length of Stay: 6  Date of Admission: 4/11/2018  Primary Care Physician: Juno Moscoso MD    Subjective   Chief Complaint: Altered mental status/dementia/weakness    HPI   Patient condition remained the same.  Patient still remained very weak.  Patient denies any shortness breath or chest pain.  Patient is tolerate mechanical soft diet.    Review of Systems   Constitutional: Positive for activity change, appetite change and fatigue. Negative for chills and fever.   HENT: Negative for hearing loss, nosebleeds, tinnitus and trouble swallowing.    Eyes: Negative for visual disturbance.   Respiratory: Positive for shortness of breath. Negative for cough, chest tightness and wheezing.    Cardiovascular: Negative for chest pain, palpitations and leg swelling.   Gastrointestinal: Negative for abdominal distention, abdominal pain, blood in stool, constipation, diarrhea, nausea and vomiting.   Endocrine: Negative for cold intolerance, heat intolerance, polydipsia, polyphagia and polyuria.   Genitourinary: Negative for decreased urine volume, difficulty urinating, dysuria, flank pain, frequency and hematuria.   Musculoskeletal: Positive for arthralgias, gait problem and myalgias. Negative for joint swelling.   Skin: Negative for rash.   Allergic/Immunologic: Negative for immunocompromised state.   Neurological: Positive for weakness. Negative for dizziness, syncope, light-headedness and headaches.   Hematological: Negative for adenopathy. Does not bruise/bleed easily.   Psychiatric/Behavioral: Positive for confusion. Negative for sleep disturbance. The patient is not nervous/anxious.           All pertinent negatives and positives are as above. All other systems have been reviewed and are negative unless otherwise stated.     Objective    Temp:  [97.5 °F (36.4 °C)-98.9 °F (37.2 °C)] 97.5 °F (36.4 °C)  Heart Rate:  [63-85]  69  Resp:  [16-22] 18  BP: (144-171)/(60-72) 162/61    Intake/Output Summary (Last 24 hours) at 04/17/18 1327  Last data filed at 04/17/18 1203   Gross per 24 hour   Intake              240 ml   Output              675 ml   Net             -435 ml     Physical Exam  Constitutional: He appears well-developed.   HENT:   Head: Normocephalic and atraumatic.   Eyes: Conjunctivae and EOM are normal. Pupils are equal, round, and reactive to light.   Neck: Neck supple. No JVD present. No thyromegaly present.   Cardiovascular: Normal rate, regular rhythm, normal heart sounds and intact distal pulses.  Exam reveals no gallop and no friction rub.    No murmur heard.  Pulmonary/Chest: Effort normal. No respiratory distress. He has wheezes. He has no rales. He exhibits no tenderness.   Diminished breath sound bilateral   Abdominal: Soft. Bowel sounds are normal. He exhibits no distension. There is no tenderness. There is no rebound and no guarding.   Musculoskeletal: He exhibits no edema, tenderness or deformity.   Lymphadenopathy:     He has no cervical adenopathy.   Neurological: He is alert. He displays normal reflexes. No cranial nerve deficit. He exhibits abnormal muscle tone. Coordination abnormal.   Skin: Skin is warm and dry. No rash noted.   Psychiatric: He has a normal mood and affect.   Nursing note and vitals reviewed.     Results Review:  Lab Results (last 24 hours)     Procedure Component Value Units Date/Time    POC Glucose Once [[id number]]  (Abnormal) Collected:  04/17/18 1200    Specimen:  Blood Updated:  04/17/18 1223     Glucose 260 (H) mg/dL      Comment: : [id number] Uriel LisaMeter ID: [id number]       POC Glucose Once [[id number]]  (Abnormal) Collected:  04/17/18 0713    Specimen:  Blood Updated:  04/17/18 0733     Glucose 214 (H) mg/dL      Comment: : [id number] Uriel LisaMeter ID: [id number]       Comprehensive Metabolic Panel [[id number]]  (Abnormal) Collected:  04/17/18 0303    Specimen:  Blood  Updated:  04/17/18 0346     Glucose 171 (H) mg/dL      BUN 9 mg/dL      Creatinine 0.80 mg/dL      Sodium 139 mmol/L      Potassium 3.4 (L) mmol/L      Chloride 100 mmol/L      CO2 30.0 mmol/L      Calcium 8.8 mg/dL      Total Protein 6.1 (L) g/dL      Albumin 3.30 (L) g/dL      ALT (SGPT) 35 U/L      AST (SGOT) 25 U/L      Alkaline Phosphatase 86 U/L      Total Bilirubin 0.6 mg/dL      eGFR Non African Amer 93 mL/min/1.73      Globulin 2.8 gm/dL      A/G Ratio 1.2 g/dL      BUN/Creatinine Ratio 11.3     Anion Gap 9.0 mmol/L     Narrative:       The MDRD GFR formula is only valid for adults with stable renal function between ages 18 and 70.    CBC & Differential [[id number]] Collected:  04/17/18 0303    Specimen:  Blood Updated:  04/17/18 0333    Narrative:       The following orders were created for panel order CBC & Differential.  Procedure                               Abnormality         Status                     ---------                               -----------         ------                     CBC Auto Differential[[id number]]        Abnormal            Final result                 Please view results for these tests on the individual orders.    CBC Auto Differential [[id number]]  (Abnormal) Collected:  04/17/18 0303    Specimen:  Blood Updated:  04/17/18 0333     WBC 4.62 (L) 10*3/mm3      RBC 3.65 (L) 10*6/mm3      Hemoglobin 10.8 (L) g/dL      Hematocrit 33.1 (L) %      MCV 90.7 fL      MCH 29.6 pg      MCHC 32.6 (L) g/dL      RDW 13.8 %      RDW-SD 45.7 fl      MPV 11.0 fL      Platelets 186 10*3/mm3      Neutrophil % 61.1 %      Lymphocyte % 25.8 %      Monocyte % 11.0 %      Eosinophil % 1.3 %      Basophil % 0.4 %      Immature Grans % 0.4 %      Neutrophils, Absolute 2.82 10*3/mm3      Lymphocytes, Absolute 1.19 10*3/mm3      Monocytes, Absolute 0.51 10*3/mm3      Eosinophils, Absolute 0.06 10*3/mm3      Basophils, Absolute 0.02 10*3/mm3      Immature Grans, Absolute 0.02 10*3/mm3      nRBC 0.0 /100  WBC     POC Glucose Once [[id number]]  (Abnormal) Collected:  04/16/18 2125    Specimen:  Blood Updated:  04/16/18 2142     Glucose 293 (H) mg/dL      Comment: : [id number] Vanna Sanchez ID: [id number]       POC Glucose Once [[id number]]  (Abnormal) Collected:  04/16/18 1528    Specimen:  Blood Updated:  04/16/18 1540     Glucose 351 (H) mg/dL      Comment: : [id number] Emigdio [facility] ID: [id number]       Blood Culture - Blood, Blood, Venous Line [[id number]]  (Normal) Collected:  04/11/18 1325    Specimen:  Blood from Arm, Left Updated:  04/16/18 1431     Blood Culture No growth at 5 days    Blood Culture - Blood, Blood, Venous Line [[id number]]  (Normal) Collected:  04/11/18 1329    Specimen:  Blood from Arm, Left Updated:  04/16/18 1431     Blood Culture No growth at 5 days           Cultures:  Blood Culture   Date Value Ref Range Status   04/11/2018 No growth at 5 days  Final   04/11/2018 No growth at 5 days  Final     Respiratory Culture   Date Value Ref Range Status   04/12/2018 Light growth (2+) Normal Respiratory An  Final       Radiology Data:    Imaging Results (last 24 hours)     ** No results found for the last 24 hours. **          No Known Allergies    Scheduled meds:     amiodarone 200 mg Oral Daily   amoxicillin-clavulanate 1 tablet Oral Q12H   aspirin 81 mg Oral Daily   atorvastatin 40 mg Oral Nightly   carvedilol 6.25 mg Oral Q12H   clopidogrel 75 mg Oral Daily   enoxaparin 40 mg Subcutaneous Q24H   ferrous sulfate 325 mg Oral Daily With Breakfast   folic acid 2 mg Oral Daily   guaiFENesin 1,200 mg Oral Q12H   insulin lispro 0-9 Units Subcutaneous 4x Daily With Meals & Nightly   ipratropium-albuterol 3 mL Nebulization Q6H While Awake - RT   levothyroxine 75 mcg Oral Q AM   midodrine 5 mg Oral TID   pantoprazole 40 mg Oral QAM       PRN meds:  •  acetaminophen  •  dextrose  •  dextrose  •  docusate sodium  •  glucagon (human recombinant)  •  HYDROcodone-acetaminophen  •  ondansetron  **OR** ondansetron ODT **OR** ondansetron  •  sodium chloride  •  Insert peripheral IV **AND** sodium chloride  •  traMADol    Assessment/Plan     Principal Problem:    Healthcare-associated pneumonia  Active Problems:    Dehydration    Chronic systolic congestive heart failure    Shortness of breath    V-tach    Other hyperlipidemia    Type 2 diabetes mellitus    Dementia      Plan:  HCAP- Augmentin antibiotics.  Duo nebs.     Chronic Systolic CHF/V. tac/CAD/cardiovascular disease/hypertension-continue Coreg, amiodarone.  Aspirin and Plavix. Patient has ICD placed stents is seen by Dr. Hunt in [place] recommended follow-up with him after discharge.      Hypotension-Midodrin    Hypokalemia-by mouth potassium.     Diabetes-insulin sliding scale.     Hyperlipidemia-continue Lipitor     Dementia with behavioral distubrance-Seroquel daily at bedtime      Anemia-iron sulfate and folic acid.  Stable     Hypothyroidism-Synthroid     GERD-Protonix     Respiratory culture normal respiratory An.  Blood culture no growth in 5 days.  Urine culture shows staph epididymitis.    Discharge Planning:  Plan for nursing home placement at [facility] tomorrow.    Wayne Grace MD   04/17/18   1:27 PM

## 2018-04-17 NOTE — PLAN OF CARE
Problem: Patient Care Overview  Goal: Plan of Care Review  Outcome: Ongoing (interventions implemented as appropriate)   04/17/18 0908   Coping/Psychosocial   Plan of Care Reviewed With patient;spouse   Plan of Care Review   Progress no change   OTHER   Outcome Summary Swallowing therapy completed. Pt alert, sitting upright in bed. Pt Completed lingual exersises 5/5 for 3 sets with 70% accuracy given maximum modeling and verbal cues. Pt completed Effortful swallow 2/2 for 2 sets with 80% accuracy given maximum verbal cues and modeling. Pt completed trials of mech soft without any s/s of aspiration. Pt was implusive; SSLP continually cued for the pt to take smaller bites and to swallow and clear the oral cavity before taking the next bite. Education on the importance of small bites, alternating, cues to swallow were provided to the pt and his spouse. Pt completed tials of honey; pt exhibited coughing x3 and throat clearing immediately after trial. Pt wife reported that he has had a cough all morning. Pt cued by the SSLP to take small sips and to pause to swallow between sips; after cues pt did not exhibit coughing or throat clear on trials. SLP will continue to follow and treat.

## 2018-04-17 NOTE — PLAN OF CARE
Problem: Patient Care Overview  Goal: Plan of Care Review  Outcome: Ongoing (interventions implemented as appropriate)   04/17/18 0527   Coping/Psychosocial   Plan of Care Reviewed With patient   Plan of Care Review   Progress no change   OTHER   Outcome Summary VSS, prn pain med given for back pain x1, no telemetry, confused to time this shift. mepilex to coccyx intact       Problem: Fluid Volume Deficit (Adult)  Goal: Optimal Fluid Balance  Outcome: Ongoing (interventions implemented as appropriate)      Problem: Wound (Includes Pressure Injury) (Adult)  Goal: Signs and Symptoms of Listed Potential Problems Will be Absent, Minimized or Managed (Wound)  Outcome: Ongoing (interventions implemented as appropriate)      Problem: Skin Injury Risk (Adult)  Goal: Identify Related Risk Factors and Signs and Symptoms  Outcome: Ongoing (interventions implemented as appropriate)    Goal: Skin Health and Integrity  Outcome: Ongoing (interventions implemented as appropriate)

## 2018-04-17 NOTE — PLAN OF CARE
Problem: Patient Care Overview  Goal: Plan of Care Review  Outcome: Ongoing (interventions implemented as appropriate)   04/17/18 4918   Coping/Psychosocial   Plan of Care Reviewed With patient;significant other   OTHER   Outcome Summary Pt. progressing good with participation in ue exs! Pt. taught to position Lue!

## 2018-04-17 NOTE — PLAN OF CARE
Problem: Patient Care Overview  Goal: Plan of Care Review  Outcome: Ongoing (interventions implemented as appropriate)   04/17/18 5463   Coping/Psychosocial   Plan of Care Reviewed With patient;spouse   Plan of Care Review   Progress no change   OTHER   Outcome Summary Consumed 45% of the last 5 meals. Recommend to initiate magic cup with lunch and dinner for extra kcal and protein. Will cont to follow and encourage food and fluid intake.

## 2018-04-17 NOTE — PROGRESS NOTES
LOS: 6 days   Patient Care Team:  Juno Moscoso MD as PCP - General  Juno Moscoso MD as PCP - Family Medicine    Chief Complaint: Principal Problem:    Healthcare-associated pneumonia  Active Problems:    Dehydration    Chronic systolic congestive heart failure    Shortness of breath    V-tach    Other hyperlipidemia    Type 2 diabetes mellitus    Dementia  Subjective    Shortness of breath  Generalized weakness  Easy fatigability  Anxious  Tiredness  Mild cough  Poor but improving appetite    Interval History: Improved overall    Patient Complaints:   Chief Complaint   Patient presents with   • Urinary Retention         Telemetry: Not on telemetry  Patient declined    Review of Systems   Constitutional: No chills   fatigue   Were appetite  No fever.   HENT: Negative.    Eyes: Negative.    Respiratory:  cough,   No chest wall soreness,   Mild shortness of breath,   no wheezing, no stridor.    Cardiovascular: Negative for chest pain,   No palpitations   No significant  leg swelling.   Gastrointestinal: Negative for abdominal distention,  No abdominal pain,   No blood in stool, constipation, diarrhea, nausea or vomiting.   Endocrine: Negative.    Genitourinary: Negative for difficulty urinating, dysuria, flank pain and hematuria.   Musculoskeletal: Negative.    Skin: Negative for rash and wound.   Allergic/Immunologic: Negative.    Neurological: Negative for dizziness, syncope, weakness,   No light-headedness or headaches.   Hematological: Does not bruise/bleed easily.   Psychiatric/Behavioral: Negative for agitation, behavioral problems, confusion, the patient is nervous/anxious.       History:   Past Medical History:   Diagnosis Date   • CAD (coronary artery disease)    • Cardiomyopathy    • Chronic systolic CHF (congestive heart failure)    • Dementia    • Hypertension    • Insulin dependent diabetes mellitus    • Myocardial infarction    • Stroke    • Sustained VT (ventricular tachycardia)      Past  Surgical History:   Procedure Laterality Date   • BACK SURGERY     • CARDIAC DEFIBRILLATOR PLACEMENT     • CAROTID ENDARTERECTOMY Right    • CATARACT EXTRACTION, BILATERAL     • CORONARY ARTERY BYPASS GRAFT     • KYPHOPLASTY       Social History     Social History   • Marital status:      Spouse name: N/A   • Number of children: N/A   • Years of education: N/A     Occupational History   • Not on file.     Social History Main Topics   • Smoking status: Never Smoker   • Smokeless tobacco: Not on file   • Alcohol use No   • Drug use: No   • Sexual activity: Defer     Other Topics Concern   • Not on file     Social History Narrative   • No narrative on file     Family History   Problem Relation Age of Onset   • Heart disease Father        Labs:  WBC WBC   Date Value Ref Range Status   04/17/2018 4.62 (L) 4.80 - 10.80 10*3/mm3 Final   04/16/2018 4.17 (L) 4.80 - 10.80 10*3/mm3 Final   04/15/2018 3.70 (L) 4.80 - 10.80 10*3/mm3 Final      HGB Hemoglobin   Date Value Ref Range Status   04/17/2018 10.8 (L) 14.0 - 18.0 g/dL Final   04/16/2018 10.4 (L) 14.0 - 18.0 g/dL Final   04/15/2018 10.2 (L) 14.0 - 18.0 g/dL Final      HCT Hematocrit   Date Value Ref Range Status   04/17/2018 33.1 (L) 40.0 - 52.0 % Final   04/16/2018 32.3 (L) 40.0 - 52.0 % Final   04/15/2018 31.6 (L) 40.0 - 52.0 % Final      Platlets Platelets   Date Value Ref Range Status   04/17/2018 186 130 - 400 10*3/mm3 Final   04/16/2018 186 130 - 400 10*3/mm3 Final   04/15/2018 192 130 - 400 10*3/mm3 Final      MCV MCV   Date Value Ref Range Status   04/17/2018 90.7 82.0 - 95.0 fL Final   04/16/2018 91.5 82.0 - 95.0 fL Final   04/15/2018 91.1 82.0 - 95.0 fL Final          Results from last 7 days  Lab Units 04/17/18  0303 04/16/18  0310 04/15/18  0649   SODIUM mmol/L 139 139 139   POTASSIUM mmol/L 3.4* 3.7 3.5   CHLORIDE mmol/L 100 101 103   CO2 mmol/L 30.0 30.0 28.0   BUN mg/dL 9 11 14   CREATININE mg/dL 0.80 0.90 0.87   CALCIUM mg/dL 8.8 9.0 8.9   BILIRUBIN  mg/dL 0.6 0.5 0.3   ALK PHOS U/L 86 83 86   ALT (SGPT) U/L 35 33 32   AST (SGOT) U/L 25 37 23   GLUCOSE mg/dL 171* 85 161*     Lab Results   Component Value Date    CKTOTAL 182 03/10/2016    CKMB 1.48 03/09/2018    CKMBINDEX 5.6 03/10/2016    TROPONINI 0.046 (H) 03/10/2018     PT/INR:  No results found for: PROTIME/No results found for: INR    Imaging Results (last 72 hours)     ** No results found for the last 72 hours. **          Objective     No Known Allergies    Medication Review: Performed  Current Facility-Administered Medications   Medication Dose Route Frequency Provider Last Rate Last Dose   • acetaminophen (TYLENOL) tablet 650 mg  650 mg Oral Q4H PRN Cari Ayoub APRN       • amiodarone (PACERONE) tablet 200 mg  200 mg Oral Daily Cari Ayoub APRN   200 mg at 04/17/18 1014   • amoxicillin-clavulanate (AUGMENTIN) 875-125 MG per tablet 1 tablet  1 tablet Oral Q12H Ishaan Gong MD   1 tablet at 04/17/18 1014   • aspirin EC tablet 81 mg  81 mg Oral Daily Cari Ayoub APRN   81 mg at 04/17/18 1014   • atorvastatin (LIPITOR) tablet 40 mg  40 mg Oral Nightly Ishaan Gong MD   40 mg at 04/16/18 2157   • carvedilol (COREG) tablet 6.25 mg  6.25 mg Oral Q12H Ishaan Gong MD   6.25 mg at 04/17/18 1014   • clopidogrel (PLAVIX) tablet 75 mg  75 mg Oral Daily Cari Ayoub APRN   75 mg at 04/17/18 1014   • dextrose (D50W) solution 25 g  25 g Intravenous Q15 Min PRN ROCIO Hatch       • dextrose (GLUTOSE) oral gel 15 g  15 g Oral Q15 Min PRN Cari Ayoub APRTAMIA       • docusate sodium (COLACE) capsule 100 mg  100 mg Oral TID PRN Cari Ayoub APRN       • enoxaparin (LOVENOX) syringe 40 mg  40 mg Subcutaneous Q24H Cari Ayoub APRN   40 mg at 04/16/18 1707   • ferrous sulfate tablet 325 mg  325 mg Oral Daily With Breakfast ROCIO Hatch   325 mg at 04/17/18 1014   • folic acid (FOLVITE) tablet 2 mg  2 mg Oral Daily ROCIO Hatch   2 mg  at 04/17/18 1013   • glucagon (human recombinant) (GLUCAGEN DIAGNOSTIC) injection 1 mg  1 mg Subcutaneous PRN ROCIO Hatch       • guaiFENesin (MUCINEX) 12 hr tablet 1,200 mg  1,200 mg Oral Q12H Ishaan Gong MD   1,200 mg at 04/17/18 1015   • HYDROcodone-acetaminophen (NORCO) 5-325 MG per tablet 1 tablet  1 tablet Oral Q4H PRN ROCIO Hatch   1 tablet at 04/16/18 2139   • insulin lispro (humaLOG) injection 0-9 Units  0-9 Units Subcutaneous 4x Daily With Meals & Nightly ROCIO Hatch   6 Units at 04/17/18 1259   • ipratropium-albuterol (DUO-NEB) nebulizer solution 3 mL  3 mL Nebulization Q6H While Awake - RT ROCIO Hatch   3 mL at 04/17/18 1148   • levothyroxine (SYNTHROID, LEVOTHROID) tablet 75 mcg  75 mcg Oral Q AM Ishaan oGng MD   75 mcg at 04/17/18 0614   • midodrine (PROAMATINE) tablet 5 mg  5 mg Oral TID ROCIO Hatch   5 mg at 04/17/18 1015   • ondansetron (ZOFRAN) tablet 4 mg  4 mg Oral Q6H PRN ROCIO Hatch        Or   • ondansetron ODT (ZOFRAN-ODT) disintegrating tablet 4 mg  4 mg Oral Q6H PRN ROCIO Hatch        Or   • ondansetron (ZOFRAN) injection 4 mg  4 mg Intravenous Q6H PRN ROCIO Hatch       • pantoprazole (PROTONIX) EC tablet 40 mg  40 mg Oral QAM ROCIO Hatch   40 mg at 04/17/18 0614   • potassium chloride (MICRO-K) CR capsule 40 mEq  40 mEq Oral Once Wayne Grace MD       • sodium chloride 0.9 % flush 1-10 mL  1-10 mL Intravenous PRN ROCIO Hatch       • sodium chloride 0.9 % flush 10 mL  10 mL Intravenous PRN Sonia Wong MD       • traMADol (ULTRAM) tablet 50 mg  50 mg Oral Q6H PRN ROCIO Hatch           Vital Sign Min/Max for last 24 hours  Temp  Min: 97.5 °F (36.4 °C)  Max: 98.9 °F (37.2 °C)   BP  Min: 144/60  Max: 171/72   Pulse  Min: 63  Max: 85   Resp  Min: 16  Max: 22   SpO2  Min: 94 %  Max: 98 %   No Data Recorded   Weight  Min: 69.9 kg (154 lb 2 oz)  Max:  "69.9 kg (154 lb 2 oz)     Flowsheet Rows    Flowsheet Row First Filed Value   Admission Height 180.3 cm (71\") Documented at 04/11/2018 1213   Admission Weight 81.6 kg (180 lb) Documented at 04/11/2018 1213          Results for orders placed during the hospital encounter of 04/11/18   Adult Transthoracic Echo Complete W/ Cont if Necessary Per Protocol    Narrative · The left ventricular cavity is mildly dilated.  · Left ventricular systolic function is moderately decreased. Estimated EF   = 30%.  · Left ventricular diastolic dysfunction (grade II) consistent with   pseudonormalization.  · Mild pulmonic valve regurgitation is present.  · There is moderate calcification of the aortic valve.  · Mild mitral valve regurgitation is present  · Left atrial cavity size is mildly dilated.  · The following left ventricular wall segments are hypokinetic: basal   anterolateral, mid anterolateral, mid inferolateral, mid inferior, basal   inferoseptal, apex hypokinetic and basal inferoseptal. The following left   ventricular wall segments are akinetic: basal inferolateral and basal   inferior.          Physical Exam:  General Appearance: Awake, alert, in no acute distress  Eyes: Pupils equal and reactive    Ears: Appear intact with no abnormalities noted  Nose: Nares normal, no drainage  Neck: supple, trachea midline, no carotid bruit and no JVD  Back: no kyphosis present,    Lungs: respirations regular, respirations even and respirations unlabored  Heart: normal S1, S2,  2/6 pansystolic murmur in the left sternal border,  no rub and no click  Abdomen: normal bowel sounds, no masses, no hepatomegaly, no splenomegaly, guarding and no rebound tenderness   Skin: no bleeding, bruising or rash  Extremities: no cyanosis  Psychiatric/Behavioral: Negative for agitation, behavioral problems, confusion, the patient does not appear to be nervous/anxious.          Results Review:   I reviewed the patient's new clinical results.  I reviewed the " patient's new imaging results and agree with the interpretation.  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 Hunt in [place] recommended follow-up with him after discharge  Physical therapy  Supportive care  Telemetry  Optimal medical therapy  Deep vein thrombosis prophylaxis/precautions  Appropriate diet, fluid, sodium, caffeine, stimulants intake   Compliance to diet and medications   Avoid NSAIDS    Andrea Solitario MD  04/17/18  5:12 PM

## 2018-04-17 NOTE — PLAN OF CARE
Problem: Patient Care Overview  Goal: Plan of Care Review  Outcome: Ongoing (interventions implemented as appropriate)   04/17/18 1109   Coping/Psychosocial   Plan of Care Reviewed With patient   Plan of Care Review   Progress improving   OTHER   Outcome Summary Pt. requires min assist 0f 1 to stand and amb 45' with RWX. Very weak. Performs LE ex's. Decreased malcom, very small steps. Decreased toe clearance. Will benefit from strengthening and increased ambulation as tolerates.

## 2018-04-17 NOTE — DISCHARGE PLACEMENT REQUEST
Acute Care - Speech Language Pathology   Swallow VFSS [facility]   Patient Name: Delon Cid : [id number] MRN: [id number]   Today's Date: 2018     Admit Date: 2018   SPEECH-LANGUAGE PATHOLOGY EVALUTION - VFSS   Subjective: The patient was seen on this date for a VFSS(Videofluoroscopic Swallowing Study). Patient was alert and cooperative.   Objective: Risks/benefits were reviewed with the patient and family, and consent was obtained. The study was completed with SLP and Radiologist present. The patient was seen in lateral view(s). Textures given included thin liquid, nectar thick liquid, honey thick liquid, puree consistency and mechanical soft consistency.   Assessment: Pt was presented the following consistencies in the stated order: honey thick, nectar thick, thin, pudding thick, mechanical soft, and repeat nectar thick trial. Pt was noted to have decreased bolus formation with prolonged oral prep phase at time. Decreased base of tongue strength, resulting in premature loss of the bolus to the pyriform sinuses with thin and nectar thick. Delay in swallow initiation with all presented consistencies. Weak laryngeal elevation, with mildly decreased anterior hyolaryngeal excursion. Poor epiglottic inversion, which was absent at times. Penetration with nectar thick with noted throat clear. Profound penetration with aspiration with thin, again with a throat clear observed, though not successful in clearing residue from the laryngeal vesibule, which remained throughout the duration of the study, with periods of subsequent trace aspiration. Pt was also noted to have persistent, moderate residue in the vallecula, lateral channels, and pyriform sinuses, which resulted in penetration during the time of the pudding thick trial, as well as occasionally throughout the remainder of the study. Moderate oropharygeal residue was noted, with a mild amount in the laryngeal vestibule from the thin liquid trial.   SLP  Findings: Patient presents with moderate to severe oropharyngeal dysphagia.   Comments: The results of this study were discussed in full with the pt's wife via [facility] [place] graduate Speech Clinician, Carson Levy, as well as with pt's RN, Cari Avalos. Family and RN verbalized understanding of concerns and below recommendations.   Recommendations: Diet Textures: honey thick liquid, mechanical soft consistency food. Medications should be taken whole or crushed with puree. May have water and Ice between meals after oral care, under staff or family supervision and with the recommended strategies for safe swallowing.   Recommended Strategies: Upright for PO, small bites and sips and supervision with all PO. Oral care before breakfast, after all meals and PRN.   Dysphagia therapy is recommended. Rationale: See above. ST to continue to follow and tx for dysphagia. Thanks! Beulah Fraga, CCC-SLP 4/12/2018 4:01 PM   Visit Dx:     ICD-10-CM ICD-9-CM   1. Dehydration E86.0 276.51   2. Hypotension, unspecified hypotension type I95.9 458.9   3. Oropharyngeal dysphagia R13.12 787.22         Patient Active Problem List   Diagnosis   • Dehydration   • Healthcare-associated pneumonia   • Chronic systolic congestive heart failure   • Shortness of breath   • V-tach   • Other hyperlipidemia   • Type 2 diabetes mellitus   • Dementia     Medical History        Past Medical History:   Diagnosis Date   • CAD (coronary artery disease)    • Cardiomyopathy    • Chronic systolic CHF (congestive heart failure)    • Dementia    • Hypertension    • Insulin dependent diabetes mellitus    • Myocardial infarction    • Stroke    • Sustained VT (ventricular tachycardia)      Surgical History         Past Surgical History:   Procedure Laterality Date   • BACK SURGERY     • CARDIAC DEFIBRILLATOR PLACEMENT     • CAROTID ENDARTERECTOMY Right    • CATARACT EXTRACTION, BILATERAL     • CORONARY ARTERY BYPASS GRAFT     • KYPHOPLASTY                   SWALLOW  EVALUATION (last 72 hours)                 SLP Adult Swallow Evaluation       Row Name 04/12/18 1430 04/12/18 0945                Rehab Evaluation    Document Type evaluation -TM (P) evaluation -DS       Subjective Information no complaints -TM --       Patient Observations alert;cooperative   Confused -TM (P) cooperative -DS       Patient/Family Observations No family accompanying pt to study -TM (P) Wife present during evaluation. -DS       Patient Effort adequate -TM (P) adequate -DS                General Information    Patient Profile Reviewed yes -TM (P) yes -DS       Pertinent History Of Current Problem History of pneumonia; stroke. Chest xray results; mild patchy lower lobe infiltrate. -TM (P) History of pneumonia; stroke. Chest xray results; mild patchy lower lobe infiltrate. -DS       Current Method of Nutrition soft textures;honey-thick liquids -TM (P) regular textures;thin liquids -DS       Precautions/Limitations, Vision WFL;for purposes of eval -TM (P) WFL;for purposes of eval -DS       Precautions/Limitations, Hearing hearing impairment, bilaterally -TM (P) hearing impairment, bilaterally -DS       Prior Level of Function-Communication cognitive-linguistic impairment -TM (P) cognitive-linguistic impairment -DS       Prior Level of Function-Swallowing no diet consistency restrictions -TM (P) no diet consistency restrictions -DS       Plans/Goals Discussed with patient;spouse/S.O. -TM (P) patient;spouse/S.O. -DS       Barriers to Rehab cognitive status -TM (P) cognitive status -DS       Patient's Goals for Discharge patient did not state -TM (P) patient did not state -DS       Family Goals for Discharge patient able to return to regular diet -TM (P) family did not state -DS                Pain Assessment    Additional Documentation Pain Scale: Numbers Pre/Post-Treatment (Group) -TM (P) Pain Scale: FACES Pre/Post-Treatment (Group) -DS                Pain Scale: Numbers Pre/Post-Treatment    Pain Scale:  Numbers, Pretreatment 0/10 - no pain -TM --       Pain Scale: Numbers, Post-Treatment 0/10 - no pain -TM --                Pain Scale: FACES Pre/Post-Treatment    Pain: FACES Scale, Pretreatment -- (P) 0-->no hurt -DS       Pain: FACES Scale, Post-Treatment -- (P) 0-->no hurt -DS                Oral Motor and Function    Dentition Assessment natural, present and adequate -TM (P) natural, present and adequate -DS       Secretion Management WNL/WFL -TM (P) WNL/WFL -DS       Mucosal Quality moist, healthy -TM (P) moist, healthy -DS       Volitional Swallow WFL -TM (P) WFL -DS       Volitional Cough WFL -TM (P) WFL -DS                Oral Musculature and Cranial Nerve Assessment    Oral Motor General Assessment oral labial or buccal impairment;mandibular impairment;lingual impairment -TM (P) WFL -DS       Mandibular Impairment Detail, Cranial Nerve V (Trigeminal) reduced mandibular ROM;reduced strength bilaterally -TM --       Oral Labial or Buccal Impairment, Detail, Cranial Nerve VII (Facial): reduced ROM;reduced strength bilaterally -TM --       Lingual Impairment, Detail. Cranial Nerves IX, XII (Glossopharyngeal and Hypoglossal) reduced lingual ROM;reduced strength -TM --       Oral Motor, Comment L labial weakness with smile -TM --                General Eating/Swallowing Observations    Respiratory Support Currently in Use room air -TM (P) room air -DS       Eating/Swallowing Skills -- (P) self-fed;fed by SLP -DS       Positioning During Eating -- (P) upright in bed -DS       Utensils Used -- (P) spoon;straw -DS       Consistencies Trialed -- (P) regular textures;pureed;thin liquids;nectar/syrup-thick liquids;honey-thick liquids -DS                Respiratory    Respiratory Status room air -TM --                Clinical Swallow Eval    Oral Prep Phase -- (P) impaired -DS       Oral Transit -- (P) WFL -DS       Oral Residue -- (P) impaired -DS       Pharyngeal Phase -- (P) suspected pharyngeal impairment -DS        Esophageal Phase -- (P) unremarkable -DS       Clinical Swallow Evaluation Summary -- (P) CBSE completed. When Speech Therapy arrived pt was eating breakfast, regular and thin liquid consistency. The pt's wife was prompting him to swallow and pace during meal. Pt completed trials of puree, regular and honey, nectar, thin liquids. Pt exhibited coughing as well as throat clearing after consuming thin and nectar. Pt exhibited oral holding with the trials of regular and with his breakfast. Pt exhibited prolonged mastication. After trials of regular pt had mild residue in the oral cavity and was prompted to use a liquid wash to clear. SLP recommending mech soft and honey thick liquids until a dysphagia study can be completed. Meds okay whole in applesause or with thick liquids. SLP will continue to follow and treat. -DS                Oral Prep Concerns    Oral Prep Concerns -- (P) oral holding;prolonged mastication -DS       Oral Holding -- (P) all consistencies -DS       Prolonged Mastication -- (P) pudding;regular consistencies -DS       Oral Prep Concerns, Comment -- (P) Pt exhibited oral holding when consuming he breakfast of regular consistencies and prolonged chewing when presented with trials of regular. -DS                Oral Residue Concerns    Oral Residue Concerns -- (P) diffuse residue throughout oral cavity -DS       Diffuse Residue Throughout Oral Cavity -- (P) regular consistencies -DS       Oral Residue Concerns, Comment -- (P) Mild amout of oral residue in the oral cavity. -DS                Pharyngeal Phase Concerns    Pharyngeal Phase Concerns -- (P) cough;throat clear -DS       Cough -- (P) thin -DS       Throat Clear -- (P) thin;nectar -DS       Pharyngeal Phase Concerns, Comment -- (P) During the CBSE the pt exhibited throat clear with the nectar thick liquids and coughed when presented with the thin liquids. -DS                MBS/VFSS    Utensils Used spoon;straw -TM --       Consistencies  Trialed pudding thick;honey-thick liquids;nectar/syrup-thick liquids;thin liquids;soft textures -TM --                MBS/VFSS Interpretation    Oral Prep Phase impaired oral phase of swallowing -TM --       Oral Transit Phase impaired -TM --       Oral Residue WFL -TM --       VFSS Summary VFSS completed. Pt was presented the following consistencies in the stated order: honey thick, nectar thick, thin, pudding thick, mechanical soft, and repeat nectar thick trial. Pt was noted to have decreased bolus formation with prolonged oral prep phase at time. Decreased base of tongue strength, resulting in premature loss of the bolus to the pyriform sinuses with thin and nectar thick. Delay in swallow initiation with all presented consistencies. Weak laryngeal elevation, with mildly decreased anterior hyolaryngeal excursion. Poor epiglottic inversion, which was absent at times. Penetration with nectar thick with noted throat clear. Profound penetration with aspiration with thin, again with a throat clear observed, though not successful in clearing residue from the laryngeal vesibule, which remained throughout the duration of the study, with periods of subsequent trace aspiration. Pt was also noted to have persistent, moderate residue in the vallecula, lateral channels, and pyriform sinuses, which resulted in penetration during the time of the pudding thick trial, as well as occasionally throughout the remainder of the study. Moderate oropharygeal residue was noted, with a mild amount in the laryngeal vestibule from the thin liquid trial. -TM --                Oral Preparatory Phase    Oral Preparatory Phase prolonged manipulation -TM --       Prolonged Manipulation pudding/puree -TM --                Oral Transit Phase    Impaired Oral Transit Phase delayed initiation of bolus transit;premature spillage of liquids into pharynx -TM --       Delayed Initiation of bolus transit all consistencies tested -TM --       Premature  Spillage of Liquids into Pharynx all consistencies tested -TM --                Initiation of Pharyngeal Swallow    Initiation of Pharyngeal Swallow bolus in valleculae;bolus in pyriform sinuses -TM --       Pharyngeal Phase impaired pharyngeal phase of swallowing -TM --       Penetration During the Swallow thin liquids;nectar-thick liquids -TM --       Aspiration During the Swallow thin liquids -TM --       Response to Penetration throat clear -TM --       Response to Aspiration throat clear -TM --       Pharyngeal Residue diffuse within pharynx -TM --                Clinical Impression    SLP Swallowing Diagnosis moderate;oral dysfunction;mod-severe;pharyngeal dysfunction -TM (P) mild;oral dysfunction;suspected pharyngeal dysfunction -DS       Functional Impact risk of aspiration/pneumonia;risk of malnutrition;risk of dehydration -TM (P) risk of aspiration/pneumonia;risk of malnutrition;risk of dehydration -DS       Rehab Potential/Prognosis, Swallowing adequate, monitor progress closely -TM (P) good, to achieve stated therapy goals -DS       Criteria for Skilled Therapeutic Interventions Met demonstrates skilled criteria -TM (P) demonstrates skilled criteria -DS                Recommendations    Therapy Frequency (Swallow) at least;3 days per week -TM (P) at least;3 days per week -DS       Predicted Duration Therapy Intervention (Days) until discharge -TM (P) until discharge -DS       SLP Diet Recommendation mechanical soft with no mixed consistencies;honey thick liquids -TM (P) mechanical soft with no mixed consistencies;honey thick liquids -DS       Recommended Diagnostics -- (P) VFSS (MBS) -DS       Recommended Precautions and Strategies upright posture during/after eating;small bites of food and sips of liquid -TM (P) upright posture during/after eating;small bites of food and sips of liquid -DS       SLP Rec. for Method of Medication Administration meds whole;meds crushed;with pudding or applesauce -TM (P)  meds whole;with pudding or applesauce;with thick liquids -DS       Monitor for Signs of Aspiration notify SLP if any concerns;cough;gurgly voice;throat clearing;pneumonia;right lower lobe infiltrates -TM (P) notify SLP if any concerns -DS       Anticipated Dischage Disposition unknown -TM (P) unknown -DS                Swallow Goals (SLP)    Oral Nutrition/Hydration Goal Selection (SLP) oral nutrition/hydration, SLP goal 1 -TM (P) oral nutrition/hydration, SLP goal 1 -DS       Labial Strengthening Goal Selection (SLP) labial strengthening, SLP goal 1 -TM --       Lingual Strengthening Goal Selection (SLP) lingual strengthening, SLP goal 1 -TM --       Pharyngeal Strengthening Exercise Goal Selection (SLP) pharyngeal strengthening exercise, SLP goal 1 -TM --       Additional Documentation labial strengthening goal selection (SLP);lingual strengthening goal selection (SLP);pharyngeal strengthening exercise goal selection (SLP) -TM --                Oral Nutrition/Hydration Goal 1 (SLP)    Oral Nutrition/Hydration Goal 1, SLP Pt will tolerate trials of recommended anad upgraded diet consistencies without overt s/s of aspiration. -TM (P) LTG: Pt will tolerate complete trials of LRD without any s/s of aspiration. -DS       Time Frame (Oral Nutrition/Hydration Goal 1, SLP) by discharge -TM (P) by discharge -DS       Barriers (Oral Nutrition/Hydration Goal 1, SLP) cognition -TM (P) cognition -DS       Progress/Outcomes (Oral Nutrition/Hydration Goal 1, SLP) goal ongoing -TM (P) goal ongoing -DS                Labial Strengthening Goal 1 (SLP)    Activity (Labial Strengthening Goal 1, SLP) increase labial tone -TM --       Increase Labial Tone labial resistance exercises -TM --       [place]/Accuracy (Labial Strengthening Goal 1, SLP) with minimal cues (75-90% accuracy) -TM --       Time Frame (Labial Strengthening Goal 1, SLP) by discharge -TM --       Barriers (Labial Strengthening Goal 1, SLP) Cognition -TM --        Progress/Outcomes (Labial Strengthening Goal 1, SLP) goal ongoing -TM --                Lingual Strengthening Goal 1 (SLP)    Activity (Lingual Strengthening Goal 1, SLP) increase lingual tone/sensation/control/coordination/movement;increase tongue back strength -TM --       Increase Lingual Tone/Sensation/Control/Coordination/Movement lingual resistance exercises;lingual movement exercises -TM --       Increase Tongue Back Strength lingual movement exercises -TM --       [place]/Accuracy (Lingual Strengthening Goal 1, SLP) with minimal cues (75-90% accuracy) -TM --       Time Frame (Lingual Strengthening Goal 1, SLP) by discharge -TM --       Barriers (Lingual Strengthening Goal 1, SLP) Cognition -TM --       Progress/Outcomes (Lingual Strengthening Goal 1, SLP) goal ongoing -TM --                Pharyngeal Strengthening Exercise Goal 1 (SLP)    Activity (Pharyngeal Strengthening Goal 1, SLP) increase timing;increase closure at entrance to airway/closure of airway at glottis;increase squeeze/positive pressure generation;increase tongue base retraction -TM --       Increase Timing gustatory stimulation (sour/cold) -TM --       Increase Closure at Entrance to Airway/Closure of Airway at Glottis --   Repeat vowel -TM --       Increase Squeeze/Positive Pressure Generation hard effortful swallow -TM --       Increase Tongue Base Retraction derrek -TM --       [place]/Accuracy (Pharyngeal Strengthening Goal 1, SLP) with minimal cues (75-90% accuracy) -TM --       Time Frame (Pharyngeal Strengthening Goal 1, SLP) by discharge -TM --       Barriers (Pharyngeal Strengthening Goal 1, SLP) Cognition -TM --       Progress/Outcomes (Pharyngeal Strengthening Goal 1, SLP) goal ongoing -TM --                           User Key  (r) = Recorded By, (t) = Taken By, (c) = Cosigned By      Initials Name Effective Dates    TM Beulah Fraga CCC-SLP 08/02/16 -     ADRIANA Levy, Speech Therapy Student 03/02/18 -          EDUCATION   The patient has been educated in the following areas:   Dysphagia (Swallowing Impairment).   SLP Recommendation and Plan   SLP Swallowing Diagnosis: moderate, oral dysfunction, mod-severe, pharyngeal dysfunction   SLP Diet Recommendation: mechanical soft with no mixed consistencies, honey thick liquids   Recommended Precautions and Strategies: upright posture during/after eating, small bites of food and sips of liquid     Monitor for Signs of Aspiration: notify SLP if any concerns, cough, gurgly voice, throat clearing, pneumonia, right lower lobe infiltrates     Criteria for Skilled Therapeutic Interventions Met: demonstrates skilled criteria   Anticipated Dischage Disposition: unknown   Rehab Potential/Prognosis, Swallowing: adequate, monitor progress closely   Therapy Frequency (Swallow): at least, 3 days per week   Predicted Duration Therapy Intervention (Days): until discharge     Plan of Care Reviewed With: spouse, other (see comments) (RN)   Plan of Care Review   Plan of Care Reviewed With: spouse, other (see comments) (RN)   Progress: (Eval)   Outcome Summary: VFSS completed. Pt was presented the following consistencies in the stated order: honey thick, nectar thick, thin, pudding thick, mechanical soft, and repeat nectar thick trial. Pt was noted to have decreased bolus formation with prolonged oral prep phase at time. Decreased base of tongue strength, resulting in premature loss of the bolus to the pyriform sinuses with thin and nectar thick. Delay in swallow initiation with all presented consistencies. Weak laryngeal elevation, with mildly decreased anterior hyolaryngeal excursion. Poor epiglottic inversion, which was absent at times. Penetration with nectar thick with noted throat clear. Profound penetration with aspiration with thin, again with a throat clear observed, though not successful in clearing residue from the laryngeal vesibule, which remained throughout the duration of the  study, with periods of subsequent trace aspiration. Pt was also noted to have persistent, moderate residue in the vallecula, lateral channels, and pyriform sinuses, which resulted in penetration during the time of the pudding thick trial, as well as occasionally throughout the remainder of the study. Moderate oropharygeal residue was noted, with a mild amount in the laryngeal vestibule from the thin liquid trial. RECOMMENDATIONS: Continue current diet of mechanical soft diet consistency with honey thick liquid consistency, downgrade to pureed diet consistency if concerns; 1:1 feeds with staff or family, monitor for impulsivity; RN to monitor for increased lung congestion; meds whole or crushed in pudding/applesauce; ok for ice chips or small spoonfuls of water in between meals, being at least 30 minutes following any other PO intake. ST to follow and tx for dysphagia. Thanks!                         SLP GOALS       Row Name 04/12/18 1430 04/12/18 0945            Oral Nutrition/Hydration Goal 1 (SLP)    Oral Nutrition/Hydration Goal 1, SLP Pt will tolerate trials of recommended anad upgraded diet consistencies without overt s/s of aspiration. -TM (P) LTG: Pt will tolerate complete trials of LRD without any s/s of aspiration. -DS     Time Frame (Oral Nutrition/Hydration Goal 1, SLP) by discharge -TM (P) by discharge -DS     Barriers (Oral Nutrition/Hydration Goal 1, SLP) cognition -TM (P) cognition -DS     Progress/Outcomes (Oral Nutrition/Hydration Goal 1, SLP) goal ongoing -TM (P) goal ongoing -DS            Labial Strengthening Goal 1 (SLP)    Activity (Labial Strengthening Goal 1, SLP) increase labial tone -TM --     Increase Labial Tone labial resistance exercises -TM --     [place]/Accuracy (Labial Strengthening Goal 1, SLP) with minimal cues (75-90% accuracy) -TM --     Time Frame (Labial Strengthening Goal 1, SLP) by discharge -TM --     Barriers (Labial Strengthening Goal 1, SLP) Cognition -TM --      Progress/Outcomes (Labial Strengthening Goal 1, SLP) goal ongoing -TM --            Lingual Strengthening Goal 1 (SLP)    Activity (Lingual Strengthening Goal 1, SLP) increase lingual tone/sensation/control/coordination/movement;increase tongue back strength -TM --     Increase Lingual Tone/Sensation/Control/Coordination/Movement lingual resistance exercises;lingual movement exercises -TM --     Increase Tongue Back Strength lingual movement exercises -TM --     [place]/Accuracy (Lingual Strengthening Goal 1, SLP) with minimal cues (75-90% accuracy) -TM --     Time Frame (Lingual Strengthening Goal 1, SLP) by discharge -TM --     Barriers (Lingual Strengthening Goal 1, SLP) Cognition -TM --     Progress/Outcomes (Lingual Strengthening Goal 1, SLP) goal ongoing -TM --            Pharyngeal Strengthening Exercise Goal 1 (SLP)    Activity (Pharyngeal Strengthening Goal 1, SLP) increase timing;increase closure at entrance to airway/closure of airway at glottis;increase squeeze/positive pressure generation;increase tongue base retraction -TM --     Increase Timing gustatory stimulation (sour/cold) -TM --     Increase Closure at Entrance to Airway/Closure of Airway at Glottis --   Repeat vowel -TM --     Increase Squeeze/Positive Pressure Generation hard effortful swallow -TM --     Increase Tongue Base Retraction derrek -TM --     [place]/Accuracy (Pharyngeal Strengthening Goal 1, SLP) with minimal cues (75-90% accuracy) -TM --     Time Frame (Pharyngeal Strengthening Goal 1, SLP) by discharge -TM --     Barriers (Pharyngeal Strengthening Goal 1, SLP) Cognition -TM --     Progress/Outcomes (Pharyngeal Strengthening Goal 1, SLP) goal ongoing -TM --                         User Key  (r) = Recorded By, (t) = Taken By, (c) = Cosigned By      Initials Name Provider Type    TM Beulah Fraga CCC-SLP Speech and Language Pathologist    ADRIANA Levy, Speech Therapy Student Speech Therapy Student                     SLP Outcome Measures (last 72 hours)               SLP Outcome Measures       Row Name 04/12/18 1430 04/12/18 1300            SLP Outcome Measures    Outcome Measure Used? Adult NOMS -TM (P) Adult NOMS -DS            FCM Scores    FCM Chosen Swallowing -TM (P) Swallowing -DS     Swallowing FCM Score 4 -TM (P) 4 -DS                         User Key  (r) = Recorded By, (t) = Taken By, (c) = Cosigned By      Initials Name Effective Dates    TM CHELA Yeboah 08/02/16 -     DS Lam Levy, Speech Therapy Student 03/02/18 -           Time Calculation:                         Time Calculation- SLP       Row Name 04/12/18 1556 04/12/18 1306            Time Calculation- SLP    SLP Start Time 1430 -TM 0945 -CS (r) DS (t) CS (c)     SLP Stop Time 1615 -TM 1051 -CS (r) DS (t) CS (c)     SLP Time Calculation (min) 105 min -TM 66 min -CS (r) DS (t)     SLP Received On 04/12/18 -TM 04/12/18 -CS (r) DS (t) CS (c)     SLP Goal Re-Cert Due Date 04/22/18 -TM 04/22/18 -CS (r) DS (t) CS (c)                         User Key  (r) = Recorded By, (t) = Taken By, (c) = Cosigned By      Initials Name Provider Type    TM Beulah Fraga CCC-SLP Speech and Language Pathologist    LAURA Jennings, MS CCC-SLP Speech and Language Pathologist    ADRIANA Levy, Speech Therapy Student Speech Therapy Student                   Therapy Charges for Today       Code Description Service Date Service Provider Modifiers Qty    33146405989 HC ST SWALLOWING CURRENT STATUS 4/12/2018 Beulah Fraga CCC-SLP GN, CK 1    08065565320 HC ST SWALLOWING PROJECTED 4/12/2018 CHELA Yeboah, CJ 1    40826854541 HC ST MOTION FLUORO EVAL SWALLOW 7 4/12/2018 CHELA Yeboah 1          SLP G-Codes   SLP NOMS Used?: Yes   Functional Limitations: Swallowing   Swallow Current Status (): At least 40 percent but less than 60 percent impaired, limited or restricted   Swallow Goal Status (): At least 20 percent but less than 40  percent impaired, limited or restricted   Beulah Fraga, CCC-SLP 4/12/2018

## 2018-04-17 NOTE — PROGRESS NOTES
LOS: 5 days   Patient Care Team:  Juno Moscoso MD as PCP - General  Juno Moscoso MD as PCP - Family Medicine    Chief Complaint: Principal Problem:    Healthcare-associated pneumonia  Active Problems:    Dehydration    Chronic systolic congestive heart failure    Shortness of breath    V-tach    Other hyperlipidemia    Type 2 diabetes mellitus    Dementia  Subjective    Shortness of breath  Generalized weakness  Easy fatigability  Anxious  Tiredness  Mild cough  Poor but improving appetite    Interval History: Improved overall    Patient Complaints:   Chief Complaint   Patient presents with   • Urinary Retention         Telemetry: no malignant arrhythmia. No significant pauses.    Review of Systems   Constitutional: No chills   fatigue   Were appetite  No fever.   HENT: Negative.    Eyes: Negative.    Respiratory:  cough,   No chest wall soreness,   Mild shortness of breath,   no wheezing, no stridor.    Cardiovascular: Negative for chest pain,   No palpitations   No significant  leg swelling.   Gastrointestinal: Negative for abdominal distention,  No abdominal pain,   No blood in stool, constipation, diarrhea, nausea or vomiting.   Endocrine: Negative.    Genitourinary: Negative for difficulty urinating, dysuria, flank pain and hematuria.   Musculoskeletal: Negative.    Skin: Negative for rash and wound.   Allergic/Immunologic: Negative.    Neurological: Negative for dizziness, syncope, weakness,   No light-headedness or headaches.   Hematological: Does not bruise/bleed easily.   Psychiatric/Behavioral: Negative for agitation, behavioral problems, confusion, the patient is nervous/anxious.       History:   Past Medical History:   Diagnosis Date   • CAD (coronary artery disease)    • Cardiomyopathy    • Chronic systolic CHF (congestive heart failure)    • Dementia    • Hypertension    • Insulin dependent diabetes mellitus    • Myocardial infarction    • Stroke    • Sustained VT (ventricular tachycardia)       Past Surgical History:   Procedure Laterality Date   • BACK SURGERY     • CARDIAC DEFIBRILLATOR PLACEMENT     • CAROTID ENDARTERECTOMY Right    • CATARACT EXTRACTION, BILATERAL     • CORONARY ARTERY BYPASS GRAFT     • KYPHOPLASTY       Social History     Social History   • Marital status:      Spouse name: N/A   • Number of children: N/A   • Years of education: N/A     Occupational History   • Not on file.     Social History Main Topics   • Smoking status: Never Smoker   • Smokeless tobacco: Not on file   • Alcohol use No   • Drug use: No   • Sexual activity: Defer     Other Topics Concern   • Not on file     Social History Narrative   • No narrative on file     Family History   Problem Relation Age of Onset   • Heart disease Father        Labs:  WBC WBC   Date Value Ref Range Status   04/16/2018 4.17 (L) 4.80 - 10.80 10*3/mm3 Final   04/15/2018 3.70 (L) 4.80 - 10.80 10*3/mm3 Final   04/14/2018 4.29 (L) 4.80 - 10.80 10*3/mm3 Final      HGB Hemoglobin   Date Value Ref Range Status   04/16/2018 10.4 (L) 14.0 - 18.0 g/dL Final   04/15/2018 10.2 (L) 14.0 - 18.0 g/dL Final   04/14/2018 10.2 (L) 14.0 - 18.0 g/dL Final      HCT Hematocrit   Date Value Ref Range Status   04/16/2018 32.3 (L) 40.0 - 52.0 % Final   04/15/2018 31.6 (L) 40.0 - 52.0 % Final   04/14/2018 31.3 (L) 40.0 - 52.0 % Final      Platlets Platelets   Date Value Ref Range Status   04/16/2018 186 130 - 400 10*3/mm3 Final   04/15/2018 192 130 - 400 10*3/mm3 Final   04/14/2018 177 130 - 400 10*3/mm3 Final      MCV MCV   Date Value Ref Range Status   04/16/2018 91.5 82.0 - 95.0 fL Final   04/15/2018 91.1 82.0 - 95.0 fL Final   04/14/2018 90.7 82.0 - 95.0 fL Final        Results from last 7 days  Lab Units 04/16/18  0310 04/15/18  0649 04/14/18  1027   SODIUM mmol/L 139 139 138   POTASSIUM mmol/L 3.7 3.5 3.5   CHLORIDE mmol/L 101 103 101   CO2 mmol/L 30.0 28.0 26.0   BUN mg/dL 11 14 15   CREATININE mg/dL 0.90 0.87 0.85   CALCIUM mg/dL 9.0 8.9 9.0    BILIRUBIN mg/dL 0.5 0.3 0.5   ALK PHOS U/L 83 86 95   ALT (SGPT) U/L 33 32 36   AST (SGOT) U/L 37 23 35   GLUCOSE mg/dL 85 161* 252*     Lab Results   Component Value Date    CKTOTAL 182 03/10/2016    CKMB 1.48 03/09/2018    CKMBINDEX 5.6 03/10/2016    TROPONINI 0.046 (H) 03/10/2018     PT/INR:  No results found for: PROTIME/No results found for: INR    Imaging Results (last 72 hours)     ** No results found for the last 72 hours. **          Objective     No Known Allergies    Medication Review: Performed  Current Facility-Administered Medications   Medication Dose Route Frequency Provider Last Rate Last Dose   • acetaminophen (TYLENOL) tablet 650 mg  650 mg Oral Q4H PRN Cari Ayoub APRN       • amiodarone (PACERONE) tablet 200 mg  200 mg Oral Daily Cari Ayoub APRN   200 mg at 04/16/18 0937   • amoxicillin-clavulanate (AUGMENTIN) 875-125 MG per tablet 1 tablet  1 tablet Oral Q12H Ishaan Gong MD   1 tablet at 04/16/18 2139   • aspirin EC tablet 81 mg  81 mg Oral Daily Cari Ayoub APRN   81 mg at 04/16/18 0938   • atorvastatin (LIPITOR) tablet 40 mg  40 mg Oral Nightly Ishaan Gong MD   40 mg at 04/16/18 2157   • carvedilol (COREG) tablet 6.25 mg  6.25 mg Oral Q12H Ishaan Gong MD   6.25 mg at 04/16/18 2139   • clopidogrel (PLAVIX) tablet 75 mg  75 mg Oral Daily Cari Ayoub APRN   75 mg at 04/16/18 0937   • dextrose (D50W) solution 25 g  25 g Intravenous Q15 Min PRN ROCIO Hatch       • dextrose (GLUTOSE) oral gel 15 g  15 g Oral Q15 Min PRN Cari Ayoub APRTAMIA       • docusate sodium (COLACE) capsule 100 mg  100 mg Oral TID PRN Cari Ayoub APRN       • enoxaparin (LOVENOX) syringe 40 mg  40 mg Subcutaneous Q24H Cari Ayoub APRN   40 mg at 04/16/18 1707   • ferrous sulfate tablet 325 mg  325 mg Oral Daily With Breakfast ROCIO Hatch   325 mg at 04/16/18 0970   • folic acid (FOLVITE) tablet 2 mg  2 mg Oral Daily Cari Ayoub  APRN   2 mg at 04/16/18 0937   • glucagon (human recombinant) (GLUCAGEN DIAGNOSTIC) injection 1 mg  1 mg Subcutaneous PRN ROCIO Hatch       • guaiFENesin (MUCINEX) 12 hr tablet 1,200 mg  1,200 mg Oral Q12H Ishaan Gong MD   1,200 mg at 04/16/18 2140   • HYDROcodone-acetaminophen (NORCO) 5-325 MG per tablet 1 tablet  1 tablet Oral Q4H PRN ROCIO Hatch   1 tablet at 04/16/18 2139   • insulin lispro (humaLOG) injection 0-9 Units  0-9 Units Subcutaneous 4x Daily With Meals & Nightly ROCIO Hatch   6 Units at 04/16/18 2140   • ipratropium-albuterol (DUO-NEB) nebulizer solution 3 mL  3 mL Nebulization Q6H While Awake - RT ROCIO Hatch   3 mL at 04/16/18 1935   • levothyroxine (SYNTHROID, LEVOTHROID) tablet 75 mcg  75 mcg Oral Q AM Ishaan Gong MD   75 mcg at 04/16/18 0937   • midodrine (PROAMATINE) tablet 5 mg  5 mg Oral TID ROCIO Hatch   5 mg at 04/16/18 2139   • ondansetron (ZOFRAN) tablet 4 mg  4 mg Oral Q6H PRN ROCIO Hatch        Or   • ondansetron ODT (ZOFRAN-ODT) disintegrating tablet 4 mg  4 mg Oral Q6H PRN ROCIO Hatch        Or   • ondansetron (ZOFRAN) injection 4 mg  4 mg Intravenous Q6H PRN ROCIO Hatch       • pantoprazole (PROTONIX) EC tablet 40 mg  40 mg Oral QAM ROCIO Hatch   40 mg at 04/16/18 0937   • sodium chloride 0.9 % flush 1-10 mL  1-10 mL Intravenous PRN ROCIO Hatch       • sodium chloride 0.9 % flush 10 mL  10 mL Intravenous PRN Sonia Wong MD       • traMADol (ULTRAM) tablet 50 mg  50 mg Oral Q6H PRN ROCIO Hatch           Vital Sign Min/Max for last 24 hours  Temp  Min: 97 °F (36.1 °C)  Max: 98.3 °F (36.8 °C)   BP  Min: 129/47  Max: 150/72   Pulse  Min: 62  Max: 72   Resp  Min: 16  Max: 20   SpO2  Min: 91 %  Max: 96 %   No Data Recorded   Weight  Min: 69.9 kg (154 lb 2 oz)  Max: 69.9 kg (154 lb 2 oz)     Flowsheet Rows    Flowsheet Row First Filed Value  "  Admission Height 180.3 cm (71\") Documented at 04/11/2018 1213   Admission Weight 81.6 kg (180 lb) Documented at 04/11/2018 1213          Results for orders placed during the hospital encounter of 04/11/18   Adult Transthoracic Echo Complete W/ Cont if Necessary Per Protocol    Narrative · The left ventricular cavity is mildly dilated.  · Left ventricular systolic function is moderately decreased. Estimated EF   = 30%.  · Left ventricular diastolic dysfunction (grade II) consistent with   pseudonormalization.  · Mild pulmonic valve regurgitation is present.  · There is moderate calcification of the aortic valve.  · Mild mitral valve regurgitation is present  · Left atrial cavity size is mildly dilated.  · The following left ventricular wall segments are hypokinetic: basal   anterolateral, mid anterolateral, mid inferolateral, mid inferior, basal   inferoseptal, apex hypokinetic and basal inferoseptal. The following left   ventricular wall segments are akinetic: basal inferolateral and basal   inferior.          Physical Exam:  General Appearance: Awake, alert, in no acute distress  Eyes: Pupils equal and reactive    Ears: Appear intact with no abnormalities noted  Nose: Nares normal, no drainage  Neck: supple, trachea midline, no carotid bruit and no JVD  Back: no kyphosis present,    Lungs: respirations regular, respirations even and respirations unlabored  Heart: normal S1, S2,  2/6 pansystolic murmur in the left sternal border,  no rub and no click  Abdomen: normal bowel sounds, no masses, no hepatomegaly, no splenomegaly, guarding and no rebound tenderness   Skin: no bleeding, bruising or rash  Extremities: no cyanosis  Psychiatric/Behavioral: Negative for agitation, behavioral problems, confusion, the patient does not appear to be nervous/anxious.          Results Review:   I reviewed the patient's new clinical results.  I reviewed the patient's new imaging results and agree with the interpretation.  I " reviewed the patient's other test results and agree with the interpretation  I personally viewed and interpreted the patient's EKG/Telemetry data  Discussed with patient, and present family member(s)     Assessment/Plan     Principal Problem:    Healthcare-associated pneumonia  Active Problems:    Dehydration    Chronic systolic congestive heart failure    Shortness of breath    V-tach    Other hyperlipidemia    Type 2 diabetes mellitus    Dementia   Hypotension resolved    Plan    Continue current medication  Patient has ICD placed stents is seen by Dr. Hunt in [place] recommended follow-up with him after discharge  Physical therapy  Supportive care  Telemetry  Optimal medical therapy  Deep vein thrombosis prophylaxis/precautions  Appropriate diet, fluid, sodium, caffeine, stimulants intake   Compliance to diet and medications   Avoid NSAIDS    Andrea Solitario MD  04/16/18  10:19 PM

## 2018-04-17 NOTE — PROGRESS NOTES
Continued Stay Note   [facility]     Patient Name: Delon Cid  MRN: [id number]  Today's Date: 4/17/2018    Admit Date: 4/11/2018          Discharge Plan     Row Name 04/17/18 1201       Plan    Plan SNF    Patient/Family in Agreement with Plan yes    Plan Comments Pt has rec'd bed offers from [facility] and [facility]. Spoke with pt's granddaughter/POA Radha [phone] and she has accepted [facility]. Spoke with Gladis williamson [phone] and they can take pt tomorrow. Informed Dr. Grace.               Discharge Codes    No documentation.           MSISY Root

## 2018-04-17 NOTE — PLAN OF CARE
Problem: Patient Care Overview  Goal: Plan of Care Review  Outcome: Ongoing (interventions implemented as appropriate)   04/17/18 6125   Coping/Psychosocial   Plan of Care Reviewed With patient;spouse   Plan of Care Review   Progress no change   OTHER   Outcome Summary await nhp cont with p.t. s.t. o.t. pt very weak appetite fair to poor refuses tel room air sat > 90 % spouse will give pt thin liquids after told many times not to     Goal: Individualization and Mutuality  Outcome: Ongoing (interventions implemented as appropriate)    Goal: Discharge Needs Assessment  Outcome: Ongoing (interventions implemented as appropriate)      Problem: Fall Risk (Adult)  Goal: Identify Related Risk Factors and Signs and Symptoms  Outcome: Ongoing (interventions implemented as appropriate)    Goal: Absence of Fall  Outcome: Ongoing (interventions implemented as appropriate)

## 2018-04-17 NOTE — THERAPY TREATMENT NOTE
Acute Care - Speech Language Pathology   Swallow Treatment Note [facility]     Patient Name: Delon Cid  : 1935  MRN: [id number]  Today's Date: 2018  Onset of Illness/Injury or Date of Surgery: 18     Referring Physician: ROCIO Reynolds      Admit Date: 2018  Swallowing therapy completed. Pt alert, sitting upright in bed. Pt Completed lingual exersises 5/5 for 3 sets with 70% accuracy given maximum modeling and verbal cues. Pt completed Effortful swallow 2/2 for 2 sets with 80% accuracy given maximum verbal cues and modeling. Pt completed trials of mech soft without any s/s of aspiration. Pt was implusive; SSLP continually cued for the pt to take smaller bites and to swallow and clear the oral cavity before taking the next bite. Education on the importance of small bites, alternating, cues to swallow were provided to the pt and his spouse. Pt completed tials of honey; pt exhibited coughing x3 and throat clearing immediately after trial. Pt wife reported that he has had a cough all morning. Pt cued by the SSLP to take small sips and to pause to swallow between sips; after cues pt did not exhibit coughing or throat clear on trials. SLP will continue to follow and treat.   Lam Levy, Speech Therapy Student 2018 9:17 AM  Visit Dx:      ICD-10-CM ICD-9-CM   1. Dehydration E86.0 276.51   2. Hypotension, unspecified hypotension type I95.9 458.9   3. Oropharyngeal dysphagia R13.12 787.22   4. Impaired functional mobility, balance, gait, and endurance Z74.09 V49.89   5. Impaired mobility and ADLs Z74.09 799.89     Patient Active Problem List   Diagnosis   • Dehydration   • Healthcare-associated pneumonia   • Chronic systolic congestive heart failure   • Shortness of breath   • V-tach   • Other hyperlipidemia   • Type 2 diabetes mellitus   • Dementia       Therapy Treatment    Therapy Treatment / Health Promotion    Treatment Time/Intention  Discipline: (P) speech language  pathologist (04/17/18 0812 : Lam Levy Speech Therapy Student)  Document Type: (P) therapy note (daily note) (04/17/18 0812 : Lam Levy Speech Therapy Student)  Subjective Information: (P) no complaints (04/17/18 0812 : Lam Levy Speech Therapy Student)  Patient/Family Observations: (P) Wife present during therapy session (04/17/18 0812 : Lam Levy Speech Therapy Student)  Care Plan Review: (P) care plan/treatment goals reviewed (04/17/18 0812 : Lam Levy Speech Therapy Student)  Care Plan Review, Other Participant(s): (P) spouse (04/17/18 0812 : Lam Levy Speech Therapy Student)  Patient Effort: (P) good (04/17/18 0812 : Lam Levy Speech Therapy Student)  Plan of Care Review  Plan of Care Reviewed With: (P) patient, spouse (04/17/18 0908 : Lam Levy Speech Therapy Student)    Vitals/Pain/Safety  Pain Assessment  Additional Documentation: (P) Pain Scale: FACES Pre/Post-Treatment (Group) (04/17/18 0812 : Lam Levy Speech Therapy Student)  Pain Scale: FACES Pre/Post-Treatment  Pain: FACES Scale, Pretreatment: (P) 0-->no hurt (04/17/18 0812 : David Epperson Therapy Student)  Pain: FACES Scale, Post-Treatment: (P) 0-->no hurt (04/17/18 0812 : Lam Levy Speech Therapy Student)    Cognition, Communication, Swallow  Recommendations  Anticipated Dischage Disposition: (P) unknown (04/17/18 0812 : Lam Levy Speech Therapy Student)    Outcome Summary  Outcome Summary/Treatment Plan (SLP)  Daily Summary of Progress (SLP): (P) progress toward functional goals is gradual (04/17/18 0812 : Lam Levy Speech Therapy Student)  Barriers to Overall Progress (SLP): (P) cognition (04/17/18 0812 : Lam Levy Speech Therapy Student)  Plan for Continued Treatment (SLP): (P) SLP will continue to follow and treat.  (04/17/18 0812 : Lam R Sympson, Speech Therapy Student)  Anticipated Dischage Disposition: (P) unknown (04/17/18 0812 : Lam CARY  Cam, Speech Therapy Student)            SLP GOALS     Row Name 04/17/18 0900 04/16/18 1037          Oral Nutrition/Hydration Goal 1 (SLP)    Oral Nutrition/Hydration Goal 1, SLP (P)  Pt will tolerate trials of recommended anad upgraded diet consistencies without overt s/s of aspiration.   -DS Pt will tolerate trials of recommended anad upgraded diet consistencies without overt s/s of aspiration.   -CS (r) DS (t) CS (c)     Time Frame (Oral Nutrition/Hydration Goal 1, SLP) (P)  by discharge  -DS by discharge  -CS (r) DS (t) CS (c)     Barriers (Oral Nutrition/Hydration Goal 1, SLP) (P)  cognition  -DS cognition  -CS (r) DS (t) CS (c)     Progress/Outcomes (Oral Nutrition/Hydration Goal 1, SLP) (P)  continuing progress toward goal  -DS continuing progress toward goal  -CS (r) DS (t) CS (c)        Labial Strengthening Goal 1 (SLP)    Activity (Labial Strengthening Goal 1, SLP) (P)  increase labial tone  -DS increase labial tone  -CS (r) DS (t) CS (c)     Increase Labial Tone (P)  labial resistance exercises  -DS labial resistance exercises  -CS (r) DS (t) CS (c)     [place]/Accuracy (Labial Strengthening Goal 1, SLP) (P)  with minimal cues (75-90% accuracy)  -DS with minimal cues (75-90% accuracy)  -CS (r) DS (t) CS (c)     Time Frame (Labial Strengthening Goal 1, SLP) (P)  short term goal (STG);by discharge  -DS short term goal (STG);by discharge  -CS (r) DS (t) CS (c)     Barriers (Labial Strengthening Goal 1, SLP) (P)  Cognition  -DS Cognition  -CS (r) DS (t) CS (c)     Progress/Outcomes (Labial Strengthening Goal 1, SLP) (P)  goal ongoing  -DS continuing progress toward goal  -CS (r) DS (t) CS (c)        Lingual Strengthening Goal 1 (SLP)    Activity (Lingual Strengthening Goal 1, SLP) (P)  increase lingual tone/sensation/control/coordination/movement;increase tongue back strength  -DS increase lingual tone/sensation/control/coordination/movement;increase tongue back strength  -CS (r) DS (t) CS (c)      Increase Lingual Tone/Sensation/Control/Coordination/Movement (P)  lingual resistance exercises;lingual movement exercises  -DS lingual resistance exercises;lingual movement exercises  -CS (r) DS (t) CS (c)     Increase Tongue Back Strength (P)  lingual movement exercises  -DS lingual movement exercises  -CS (r) DS (t) CS (c)     [place]/Accuracy (Lingual Strengthening Goal 1, SLP) (P)  with minimal cues (75-90% accuracy)  -DS with minimal cues (75-90% accuracy)  -CS (r) DS (t) CS (c)     Time Frame (Lingual Strengthening Goal 1, SLP) (P)  short term goal (STG);by discharge  -DS short term goal (STG);by discharge  -CS (r) DS (t) CS (c)     Barriers (Lingual Strengthening Goal 1, SLP) (P)  Cognition  -DS Cognition  -CS (r) DS (t) CS (c)     Progress/Outcomes (Lingual Strengthening Goal 1, SLP) (P)  continuing progress toward goal  -DS continuing progress toward goal  -CS (r) DS (t) CS (c)        Pharyngeal Strengthening Exercise Goal 1 (SLP)    Activity (Pharyngeal Strengthening Goal 1, SLP) (P)  increase timing;increase closure at entrance to airway/closure of airway at glottis;increase squeeze/positive pressure generation;increase tongue base retraction  -DS increase timing;increase closure at entrance to airway/closure of airway at glottis;increase squeeze/positive pressure generation;increase tongue base retraction  -CS (r) DS (t) CS (c)     Increase Timing (P)  gustatory stimulation (sour/cold)  -DS gustatory stimulation (sour/cold)  -CS (r) DS (t) CS (c)     Increase Squeeze/Positive Pressure Generation (P)  hard effortful swallow  -DS hard effortful swallow  -CS (r) DS (t) CS (c)     Increase Tongue Base Retraction (P)  derrek  -DS derrek  -CS (r) DS (t) CS (c)     [place]/Accuracy (Pharyngeal Strengthening Goal 1, SLP) (P)  with minimal cues (75-90% accuracy)  -DS with minimal cues (75-90% accuracy)  -CS (r) DS (t) CS (c)     Time Frame (Pharyngeal Strengthening Goal 1, SLP) (P)  short term goal  (STG);by discharge  -DS short term goal (STG);by discharge  -CS (r) DS (t) CS (c)     Barriers (Pharyngeal Strengthening Goal 1, SLP) (P)  Cognition  -DS Cognition  -CS (r) DS (t) CS (c)     Progress/Outcomes (Pharyngeal Strengthening Goal 1, SLP) (P)  continuing progress toward goal  -DS continuing progress toward goal  -CS (r) DS (t) CS (c)       User Key  (r) = Recorded By, (t) = Taken By, (c) = Cosigned By    Initials Name Provider Type    LAURA Jennings MS CCC-SLP Speech and Language Pathologist    ADRIANA Levy, Speech Therapy Student Speech Therapy Student          EDUCATION  The patient has been educated in the following areas:   Dysphagia (Swallowing Impairment).    SLP Recommendation and Plan                       Anticipated Dischage Disposition: (P) unknown                Plan of Care Reviewed With: (P) patient, spouse  Plan of Care Review  Plan of Care Reviewed With: (P) patient, spouse  Daily Summary of Progress (SLP): (P) progress toward functional goals is gradual  Plan for Continued Treatment (SLP): (P) SLP will continue to follow and treat.   Progress: (P) no change  Outcome Summary: (P) Swallowing therapy completed. Pt alert, sitting upright in bed. Pt Completed lingual exersises 5/5 for 3 sets with 70% accuracy given maximum modeling and verbal cues. Pt completed Effortful swallow 2/2 for 2 sets with 80% accuracy given maximum verbal cues and modeling. Pt completed trials of mech soft without any s/s of aspiration. Pt was implusive; SSLP continually cued for the pt to take smaller bites and to swallow and clear the oral cavity before taking the next bite. Education on the importance of small bites, alternating, cues to swallow were provided to the pt and his spouse. Pt completed tials of honey; pt exhibited coughing x3 and throat clearing immediately after trial. Pt wife reported that he has had a cough all morning. Pt cued by the SSLP to take small sips and to pause to swallow between  sips; after cues pt did not exhibit coughing or throat clear on trials. SLP will continue to follow and treat.            Time Calculation:         Time Calculation- SLP     Row Name 04/17/18 0916             Time Calculation- SLP    SLP Start Time (P)  0812  -DS      SLP Stop Time (P)  0844  -DS      SLP Time Calculation (min) (P)  32 min  -DS      SLP Received On (P)  04/17/18  -DS        User Key  (r) = Recorded By, (t) = Taken By, (c) = Cosigned By    Initials Name Provider Type     Lam Levy Speech Therapy Student Speech Therapy Student          Therapy Charges for Today     Code Description Service Date Service Provider Modifiers Qty    98670348425 HC ST TREATMENT SWALLOW 2 4/16/2018 Lam Levy Speech Therapy Student MARLA, KMAY 1    72991391446 HC ST TREATMENT SWALLOW 2 4/17/2018 Lam Levy Speech Therapy Student MARLA, KX 1          SLP G-Codes  SLP NOMS Used?: Yes  Functional Limitations: Swallowing  Swallow Current Status (): At least 40 percent but less than 60 percent impaired, limited or restricted  Swallow Goal Status (): At least 20 percent but less than 40 percent impaired, limited or restricted      Lam Levy Speech Therapy Student  4/17/2018

## 2018-04-18 VITALS
HEART RATE: 71 BPM | TEMPERATURE: 97.9 F | WEIGHT: 154.13 LBS | HEIGHT: 71 IN | DIASTOLIC BLOOD PRESSURE: 62 MMHG | OXYGEN SATURATION: 96 % | BODY MASS INDEX: 21.58 KG/M2 | SYSTOLIC BLOOD PRESSURE: 149 MMHG | RESPIRATION RATE: 17 BRPM

## 2018-04-18 LAB
ALBUMIN SERPL-MCNC: 3 G/DL (ref 3.5–5)
ALBUMIN/GLOB SERPL: 1.1 G/DL (ref 1.1–2.5)
ALP SERPL-CCNC: 79 U/L (ref 24–120)
ALT SERPL W P-5'-P-CCNC: 27 U/L (ref 0–54)
ANION GAP SERPL CALCULATED.3IONS-SCNC: 8 MMOL/L (ref 4–13)
AST SERPL-CCNC: 28 U/L (ref 7–45)
BASOPHILS # BLD AUTO: 0.01 10*3/MM3 (ref 0–0.2)
BASOPHILS NFR BLD AUTO: 0.2 % (ref 0–2)
BILIRUB SERPL-MCNC: 0.6 MG/DL (ref 0.1–1)
BUN BLD-MCNC: 9 MG/DL (ref 5–21)
BUN/CREAT SERPL: 10.7 (ref 7–25)
CALCIUM SPEC-SCNC: 8.6 MG/DL (ref 8.4–10.4)
CHLORIDE SERPL-SCNC: 100 MMOL/L (ref 98–110)
CO2 SERPL-SCNC: 30 MMOL/L (ref 24–31)
CREAT BLD-MCNC: 0.84 MG/DL (ref 0.5–1.4)
DEPRECATED RDW RBC AUTO: 45.6 FL (ref 40–54)
EOSINOPHIL # BLD AUTO: 0.05 10*3/MM3 (ref 0–0.7)
EOSINOPHIL NFR BLD AUTO: 1.1 % (ref 0–4)
ERYTHROCYTE [DISTWIDTH] IN BLOOD BY AUTOMATED COUNT: 13.9 % (ref 12–15)
GFR SERPL CREATININE-BSD FRML MDRD: 88 ML/MIN/1.73
GLOBULIN UR ELPH-MCNC: 2.7 GM/DL
GLUCOSE BLD-MCNC: 232 MG/DL (ref 70–100)
GLUCOSE BLDC GLUCOMTR-MCNC: 206 MG/DL (ref 70–130)
GLUCOSE BLDC GLUCOMTR-MCNC: 239 MG/DL (ref 70–130)
HCT VFR BLD AUTO: 30.7 % (ref 40–52)
HGB BLD-MCNC: 10.1 G/DL (ref 14–18)
IMM GRANULOCYTES # BLD: 0.03 10*3/MM3 (ref 0–0.03)
IMM GRANULOCYTES NFR BLD: 0.6 % (ref 0–5)
LYMPHOCYTES # BLD AUTO: 1.05 10*3/MM3 (ref 0.72–4.86)
LYMPHOCYTES NFR BLD AUTO: 22.2 % (ref 15–45)
MCH RBC QN AUTO: 29.9 PG (ref 28–32)
MCHC RBC AUTO-ENTMCNC: 32.9 G/DL (ref 33–36)
MCV RBC AUTO: 90.8 FL (ref 82–95)
MONOCYTES # BLD AUTO: 0.52 10*3/MM3 (ref 0.19–1.3)
MONOCYTES NFR BLD AUTO: 11 % (ref 4–12)
NEUTROPHILS # BLD AUTO: 3.08 10*3/MM3 (ref 1.87–8.4)
NEUTROPHILS NFR BLD AUTO: 64.9 % (ref 39–78)
NRBC BLD MANUAL-RTO: 0 /100 WBC (ref 0–0)
PLATELET # BLD AUTO: 183 10*3/MM3 (ref 130–400)
PMV BLD AUTO: 10.9 FL (ref 6–12)
POTASSIUM BLD-SCNC: 3.8 MMOL/L (ref 3.5–5.3)
PROT SERPL-MCNC: 5.7 G/DL (ref 6.3–8.7)
RBC # BLD AUTO: 3.38 10*6/MM3 (ref 4.8–5.9)
SODIUM BLD-SCNC: 138 MMOL/L (ref 135–145)
WBC NRBC COR # BLD: 4.74 10*3/MM3 (ref 4.8–10.8)

## 2018-04-18 PROCEDURE — 97116 GAIT TRAINING THERAPY: CPT

## 2018-04-18 PROCEDURE — 94799 UNLISTED PULMONARY SVC/PX: CPT

## 2018-04-18 PROCEDURE — 92526 ORAL FUNCTION THERAPY: CPT

## 2018-04-18 PROCEDURE — 80053 COMPREHEN METABOLIC PANEL: CPT | Performed by: FAMILY MEDICINE

## 2018-04-18 PROCEDURE — 99233 SBSQ HOSP IP/OBS HIGH 50: CPT | Performed by: INTERNAL MEDICINE

## 2018-04-18 PROCEDURE — 63710000001 INSULIN LISPRO (HUMAN) PER 5 UNITS: Performed by: NURSE PRACTITIONER

## 2018-04-18 PROCEDURE — 97110 THERAPEUTIC EXERCISES: CPT

## 2018-04-18 PROCEDURE — 85025 COMPLETE CBC W/AUTO DIFF WBC: CPT | Performed by: FAMILY MEDICINE

## 2018-04-18 PROCEDURE — 82962 GLUCOSE BLOOD TEST: CPT

## 2018-04-18 RX ORDER — PANTOPRAZOLE SODIUM 40 MG/1
40 TABLET, DELAYED RELEASE ORAL EVERY MORNING
Start: 2018-04-18

## 2018-04-18 RX ORDER — GUAIFENESIN 600 MG/1
1200 TABLET, EXTENDED RELEASE ORAL EVERY 12 HOURS SCHEDULED
Start: 2018-04-18

## 2018-04-18 RX ORDER — ATORVASTATIN CALCIUM 40 MG/1
40 TABLET, FILM COATED ORAL NIGHTLY
Start: 2018-04-18

## 2018-04-18 RX ORDER — ONDANSETRON 4 MG/1
4 TABLET, FILM COATED ORAL EVERY 6 HOURS PRN
Start: 2018-04-18

## 2018-04-18 RX ORDER — LEVOTHYROXINE SODIUM 0.07 MG/1
75 TABLET ORAL DAILY
Start: 2018-04-18

## 2018-04-18 RX ORDER — TRAMADOL HYDROCHLORIDE 50 MG/1
50 TABLET ORAL EVERY 6 HOURS PRN
Qty: 12 TABLET | Refills: 0 | Status: SHIPPED | OUTPATIENT
Start: 2018-04-18

## 2018-04-18 RX ORDER — AMOXICILLIN AND CLAVULANATE POTASSIUM 875; 125 MG/1; MG/1
1 TABLET, FILM COATED ORAL EVERY 12 HOURS SCHEDULED
Qty: 7 TABLET | Refills: 0 | Status: SHIPPED | OUTPATIENT
Start: 2018-04-18 | End: 2018-04-22

## 2018-04-18 RX ORDER — IPRATROPIUM BROMIDE AND ALBUTEROL SULFATE 2.5; .5 MG/3ML; MG/3ML
3 SOLUTION RESPIRATORY (INHALATION) EVERY 6 HOURS PRN
Qty: 360 ML
Start: 2018-04-18

## 2018-04-18 RX ORDER — ACETAMINOPHEN 325 MG/1
650 TABLET ORAL EVERY 4 HOURS PRN
Start: 2018-04-18

## 2018-04-18 RX ADMIN — CARVEDILOL 6.25 MG: 6.25 TABLET, FILM COATED ORAL at 09:43

## 2018-04-18 RX ADMIN — MIDODRINE HYDROCHLORIDE 5 MG: 2.5 TABLET ORAL at 09:42

## 2018-04-18 RX ADMIN — PANTOPRAZOLE SODIUM 40 MG: 40 TABLET, DELAYED RELEASE ORAL at 06:06

## 2018-04-18 RX ADMIN — FOLIC ACID 2 MG: 1 TABLET ORAL at 09:43

## 2018-04-18 RX ADMIN — IPRATROPIUM BROMIDE AND ALBUTEROL SULFATE 3 ML: 2.5; .5 SOLUTION RESPIRATORY (INHALATION) at 06:20

## 2018-04-18 RX ADMIN — AMIODARONE HYDROCHLORIDE 200 MG: 200 TABLET ORAL at 09:42

## 2018-04-18 RX ADMIN — ASPIRIN 81 MG: 81 TABLET ORAL at 09:43

## 2018-04-18 RX ADMIN — FERROUS SULFATE TAB 325 MG (65 MG ELEMENTAL FE) 325 MG: 325 (65 FE) TAB at 09:43

## 2018-04-18 RX ADMIN — AMOXICILLIN AND CLAVULANATE POTASSIUM 1 TABLET: 875; 125 TABLET, FILM COATED ORAL at 09:43

## 2018-04-18 RX ADMIN — GUAIFENESIN 1200 MG: 600 TABLET, EXTENDED RELEASE ORAL at 09:43

## 2018-04-18 RX ADMIN — LEVOTHYROXINE SODIUM 75 MCG: 75 TABLET ORAL at 06:06

## 2018-04-18 RX ADMIN — INSULIN LISPRO 4 UNITS: 100 INJECTION, SOLUTION INTRAVENOUS; SUBCUTANEOUS at 09:42

## 2018-04-18 RX ADMIN — CLOPIDOGREL 75 MG: 75 TABLET, FILM COATED ORAL at 09:42

## 2018-04-18 NOTE — THERAPY DISCHARGE NOTE
Acute Care - Physical Therapy Discharge Summary  [facility]       Patient Name: Delon Cid  : 1935  MRN: [id number]    Today's Date: 2018  Onset of Illness/Injury or Date of Surgery: 18    Date of Referral to PT: 18  Referring Physician: ROCIO Reynolds      Admit Date: 2018      PT Recommendation and Plan    Visit Dx:    ICD-10-CM ICD-9-CM   1. Dehydration E86.0 276.51   2. Hypotension, unspecified hypotension type I95.9 458.9   3. Oropharyngeal dysphagia R13.12 787.22   4. Impaired functional mobility, balance, gait, and endurance Z74.09 V49.89   5. Impaired mobility and ADLs Z74.09 799.89             Outcome Measures     Row Name 18 1332 18 1100 18 1504       How much help from another person do you currently need...    Turning from your back to your side while in flat bed without using bedrails?  -- 3  -ROMERO 3  -LG    Moving from lying on back to sitting on the side of a flat bed without bedrails?  -- 3  -ROMERO 3  -LG    Moving to and from a bed to a chair (including a wheelchair)?  -- 3  -ROMERO 2  -LG    Standing up from a chair using your arms (e.g., wheelchair, bedside chair)?  -- 3  -ROMERO 2  -LG    Climbing 3-5 steps with a railing?  -- 1  -ROMERO 1  -LG    To walk in hospital room?  -- 3  -ROMERO 2  -LG    AM-PAC 6 Clicks Score  -- 16  -ROMERO 13  -LG       How much help from another is currently needed...    Putting on and taking off regular lower body clothing? 2  -CJ  --  --    Bathing (including washing, rinsing, and drying) 2  -CJ  --  --    Toileting (which includes using toilet bed pan or urinal) 2  -CJ  --  --    Putting on and taking off regular upper body clothing 2  -CJ  --  --    Taking care of personal grooming (such as brushing teeth) 2  -CJ  --  --    Eating meals 2  -CJ  --  --    Score 12  -CJ  --  --       Functional Assessment    Outcome Measure Options AM-PAC 6 Clicks Daily Activity (OT)  -CJ  -- AM-PAC 6 Clicks Basic Mobility (PT)  -LG    Row Name  04/16/18 1342             How much help from another is currently needed...    Putting on and taking off regular lower body clothing? 2  -CJ      Bathing (including washing, rinsing, and drying) 2  -CJ      Toileting (which includes using toilet bed pan or urinal) 2  -CJ      Putting on and taking off regular upper body clothing 2  -CJ      Taking care of personal grooming (such as brushing teeth) 2  -CJ      Eating meals 2  -CJ      Score 12  -CJ         Functional Assessment    Outcome Measure Options AM-PAC 6 Clicks Daily Activity (OT)  -CJ        User Key  (r) = Recorded By, (t) = Taken By, (c) = Cosigned By    Initials Name Provider Type    LUCERO Combs PTA Physical Therapy Assistant    TAMIA Randall Occupational Therapy Assistant    ROMERO Russo PTA Physical Therapy Assistant                PT Charges     Row Name 04/18/18 0959             Time Calculation    Start Time 0936  -      Stop Time 1000  -ROMERO      Time Calculation (min) 24 min  -      PT Received On 04/18/18  -      PT Goal Re-Cert Due Date 04/23/18  -        User Key  (r) = Recorded By, (t) = Taken By, (c) = Cosigned By    Initials Name Provider Type    ROMERO Russo PTA Physical Therapy Assistant                  PT Rehab Goals     Row Name 04/18/18 1400             Bed Mobility Goal 1 (PT)    Progress/Outcomes (Bed Mobility Goal 1, PT) goal met  -ROMERO         Transfer Goal 1 (PT)    [place] Level/Cues Needed (Transfer Goal 1, PT) minimum assist (75% or more patient effort)  -      Progress/Outcome (Transfer Goal 1, PT) goal not met  -ROMERO         Gait Training Goal 1 (PT)    Distance (Gait Goal 1, PT) 25  -ROMERO      Progress/Outcome (Gait Training Goal 1, PT) goal met  -ROMERO        User Key  (r) = Recorded By, (t) = Taken By, (c) = Cosigned By    Initials Name Provider Type Discipline    ROMERO Russo PTA Physical Therapy Assistant PT          Therapy Charges for Today     Code Description Service  Date Service Provider Modifiers Qty    67015700141 HC PT THER PROC EA 15 MIN 4/17/2018 Brooklyn Russo PTA GP, KX 1    20659554992 HC GAIT TRAINING EA 15 MIN 4/17/2018 Brooklyn Russo PTA GP, KX 1    57024292230 HC GAIT TRAINING EA 15 MIN 4/18/2018 Brooklyn Russo PTA GP, KX 1    74876341905 HC PT THER PROC EA 15 MIN 4/18/2018 Brooklyn Russo PTA GP, KX 1          PT Discharge Summary  Anticipated Discharge Disposition (PT): skilled nursing facility (SNF)  Reason for Discharge: Discharge from facility  Outcomes Achieved: Patient able to partially acheive established goals  Discharge Destination: SNF      Brooklyn Russo PTA   4/18/2018

## 2018-04-18 NOTE — THERAPY DISCHARGE NOTE
Acute Care - Speech Language Pathology Discharge Summary  [facility]       Patient Name: Delon Cid  : 1935  MRN: [id number]    Today's Date: 2018  Onset of Illness/Injury or Date of Surgery: 18       Referring Physician: ROCIO Reynolds        Admit Date: 2018      SLP Recommendation and Plan  Mechanical soft solids and honey thick liquids    Visit Dx:    ICD-10-CM ICD-9-CM   1. Dehydration E86.0 276.51   2. Hypotension, unspecified hypotension type I95.9 458.9   3. Oropharyngeal dysphagia R13.12 787.22   4. Impaired functional mobility, balance, gait, and endurance Z74.09 V49.89   5. Impaired mobility and ADLs Z74.09 799.89               Time Calculation- SLP     Row Name 18 1102             Time Calculation- SLP    SLP Start Time 0835  -MB (r) TO (t) MB (c)      SLP Stop Time 0908  -MB (r) TO (t) MB (c)      SLP Time Calculation (min) 33 min  -MB (r) TO (t)      SLP Received On 18  -MB (r) TO (t) MB (c)        User Key  (r) = Recorded By, (t) = Taken By, (c) = Cosigned By    Initials Name Provider Type    MAYANK Siddiqui, CCC-SLP Speech and Language Pathologist    TO Anabella Moses, Speech Therapy Student Speech Therapy Student                  SLP GOALS     Row Name 18 1500 18 0835 18 0900       Oral Nutrition/Hydration Goal 1 (SLP)    Oral Nutrition/Hydration Goal 1, SLP Pt will tolerate trials of recommended anad upgraded diet consistencies without overt s/s of aspiration.   -MB Pt will tolerate trials of recommended anad upgraded diet consistencies without overt s/s of aspiration.   -MB (r) TO (t) MB (c) Pt will tolerate trials of recommended anad upgraded diet consistencies without overt s/s of aspiration.   -KW (r) DS (t) KW (c)    Time Frame (Oral Nutrition/Hydration Goal 1, SLP) by discharge  -MB by discharge  -MB (r) TO (t) MB (c) by discharge  -KW (r) DS (t) KW (c)    Barriers (Oral Nutrition/Hydration Goal 1, SLP) cognition  -MB  cognition  -MB (r) TO (t) MB (c) cognition  -KW (r) DS (t) KW (c)    Progress/Outcomes (Oral Nutrition/Hydration Goal 1, SLP) goal partially met  -MB continuing progress toward goal  -MB (r) TO (t) MB (c) continuing progress toward goal  -KW (r) DS (t) KW (c)       Labial Strengthening Goal 1 (SLP)    Activity (Labial Strengthening Goal 1, SLP) increase labial tone  -MB increase labial tone  -MB (r) TO (t) MB (c) increase labial tone  -KW (r) DS (t) KW (c)    Increase Labial Tone labial resistance exercises  -MB labial resistance exercises  -MB (r) TO (t) MB (c) labial resistance exercises  -KW (r) DS (t) KW (c)    [place]/Accuracy (Labial Strengthening Goal 1, SLP) with minimal cues (75-90% accuracy)  -MB with minimal cues (75-90% accuracy)  -MB (r) TO (t) MB (c) with minimal cues (75-90% accuracy)  -KW (r) DS (t) KW (c)    Time Frame (Labial Strengthening Goal 1, SLP) short term goal (STG);by discharge  -MB short term goal (STG);by discharge  -MB (r) TO (t) MB (c) short term goal (STG);by discharge  -KW (r) DS (t) KW (c)    Barriers (Labial Strengthening Goal 1, SLP) Cognition  -MB Cognition  -MB (r) TO (t) MB (c) Cognition  -KW (r) DS (t) KW (c)    Progress/Outcomes (Labial Strengthening Goal 1, SLP) goal partially met  -MB continuing progress toward goal  -MB (r) TO (t) MB (c) goal ongoing  -KW (r) DS (t) KW (c)       Lingual Strengthening Goal 1 (SLP)    Activity (Lingual Strengthening Goal 1, SLP) increase lingual tone/sensation/control/coordination/movement;increase tongue back strength  -MB increase lingual tone/sensation/control/coordination/movement;increase tongue back strength  -MB (r) TO (t) MB (c) increase lingual tone/sensation/control/coordination/movement;increase tongue back strength  -KW (r) DS (t) KW (c)    Increase Lingual Tone/Sensation/Control/Coordination/Movement lingual resistance exercises;lingual movement exercises  -MB lingual resistance exercises;lingual movement exercises  -MB (r)  TO (t) MB (c) lingual resistance exercises;lingual movement exercises  -KW (r) DS (t) KW (c)    Increase Tongue Back Strength lingual movement exercises  -MB lingual movement exercises  -MB (r) TO (t) MB (c) lingual movement exercises  -KW (r) DS (t) KW (c)    [place]/Accuracy (Lingual Strengthening Goal 1, SLP) with minimal cues (75-90% accuracy)  -MB with minimal cues (75-90% accuracy)  -MB (r) TO (t) MB (c) with minimal cues (75-90% accuracy)  -KW (r) DS (t) KW (c)    Time Frame (Lingual Strengthening Goal 1, SLP) short term goal (STG);by discharge  -MB short term goal (STG);by discharge  -MB (r) TO (t) MB (c) short term goal (STG);by discharge  -KW (r) DS (t) KW (c)    Barriers (Lingual Strengthening Goal 1, SLP) Cognition  -MB Cognition  -MB (r) TO (t) MB (c) Cognition  -KW (r) DS (t) KW (c)    Progress/Outcomes (Lingual Strengthening Goal 1, SLP) goal partially met  -MB continuing progress toward goal  -MB (r) TO (t) MB (c) continuing progress toward goal  -KW (r) DS (t) KW (c)       Pharyngeal Strengthening Exercise Goal 1 (SLP)    Activity (Pharyngeal Strengthening Goal 1, SLP) increase timing;increase closure at entrance to airway/closure of airway at glottis;increase squeeze/positive pressure generation;increase tongue base retraction  -MB increase timing;increase closure at entrance to airway/closure of airway at glottis;increase squeeze/positive pressure generation;increase tongue base retraction  -MB (r) TO (t) MB (c) increase timing;increase closure at entrance to airway/closure of airway at glottis;increase squeeze/positive pressure generation;increase tongue base retraction  -KW (r) DS (t) KW (c)    Increase Timing gustatory stimulation (sour/cold)  -MB gustatory stimulation (sour/cold)  -MB (r) TO (t) MB (c) gustatory stimulation (sour/cold)  -KW (r) DS (t) KW (c)    Increase Closure at Entrance to Airway/Closure of Airway at Glottis --   repeat vowel  -MB  --  --    Increase Squeeze/Positive  Pressure Generation hard effortful swallow  -MB hard effortful swallow  -MB (r) TO (t) MB (c) hard effortful swallow  -KW (r) DS (t) KW (c)    Increase Tongue Base Retraction derrek  -MB derrek  -MB (r) TO (t) MB (c) derrek  -KW (r) DS (t) KW (c)    [place]/Accuracy (Pharyngeal Strengthening Goal 1, SLP) with minimal cues (75-90% accuracy)  -MB with minimal cues (75-90% accuracy)  -MB (r) TO (t) MB (c) with minimal cues (75-90% accuracy)  -KW (r) DS (t) KW (c)    Time Frame (Pharyngeal Strengthening Goal 1, SLP) short term goal (STG);by discharge  -MB short term goal (STG);by discharge  -MB (r) TO (t) MB (c) short term goal (STG);by discharge  -KW (r) DS (t) KW (c)    Barriers (Pharyngeal Strengthening Goal 1, SLP) Cognition  -MB Cognition  -MB (r) TO (t) MB (c) Cognition  -KW (r) DS (t) KW (c)    Progress/Outcomes (Pharyngeal Strengthening Goal 1, SLP) goal partially met  -MB continuing progress toward goal  -MB (r) TO (t) MB (c) continuing progress toward goal  -KW (r) DS (t) KW (c)    Row Name 04/16/18 1037             Oral Nutrition/Hydration Goal 1 (SLP)    Oral Nutrition/Hydration Goal 1, SLP Pt will tolerate trials of recommended anad upgraded diet consistencies without overt s/s of aspiration.   -CS (r) DS (t) CS (c)      Time Frame (Oral Nutrition/Hydration Goal 1, SLP) by discharge  -CS (r) DS (t) CS (c)      Barriers (Oral Nutrition/Hydration Goal 1, SLP) cognition  -CS (r) DS (t) CS (c)      Progress/Outcomes (Oral Nutrition/Hydration Goal 1, SLP) continuing progress toward goal  -CS (r) DS (t) CS (c)         Labial Strengthening Goal 1 (SLP)    Activity (Labial Strengthening Goal 1, SLP) increase labial tone  -CS (r) DS (t) CS (c)      Increase Labial Tone labial resistance exercises  -CS (r) DS (t) CS (c)      [place]/Accuracy (Labial Strengthening Goal 1, SLP) with minimal cues (75-90% accuracy)  -CS (r) DS (t) CS (c)      Time Frame (Labial Strengthening Goal 1, SLP) short term goal  (STG);by discharge  -CS (r) DS (t) CS (c)      Barriers (Labial Strengthening Goal 1, SLP) Cognition  -CS (r) DS (t) CS (c)      Progress/Outcomes (Labial Strengthening Goal 1, SLP) continuing progress toward goal  -CS (r) DS (t) CS (c)         Lingual Strengthening Goal 1 (SLP)    Activity (Lingual Strengthening Goal 1, SLP) increase lingual tone/sensation/control/coordination/movement;increase tongue back strength  -CS (r) DS (t) CS (c)      Increase Lingual Tone/Sensation/Control/Coordination/Movement lingual resistance exercises;lingual movement exercises  -CS (r) DS (t) CS (c)      Increase Tongue Back Strength lingual movement exercises  -CS (r) DS (t) CS (c)      [place]/Accuracy (Lingual Strengthening Goal 1, SLP) with minimal cues (75-90% accuracy)  -CS (r) DS (t) CS (c)      Time Frame (Lingual Strengthening Goal 1, SLP) short term goal (STG);by discharge  -CS (r) DS (t) CS (c)      Barriers (Lingual Strengthening Goal 1, SLP) Cognition  -CS (r) DS (t) CS (c)      Progress/Outcomes (Lingual Strengthening Goal 1, SLP) continuing progress toward goal  -CS (r) DS (t) CS (c)         Pharyngeal Strengthening Exercise Goal 1 (SLP)    Activity (Pharyngeal Strengthening Goal 1, SLP) increase timing;increase closure at entrance to airway/closure of airway at glottis;increase squeeze/positive pressure generation;increase tongue base retraction  -CS (r) DS (t) CS (c)      Increase Timing gustatory stimulation (sour/cold)  -CS (r) DS (t) CS (c)      Increase Squeeze/Positive Pressure Generation hard effortful swallow  -CS (r) DS (t) CS (c)      Increase Tongue Base Retraction derrek  -CS (r) DS (t) CS (c)      [place]/Accuracy (Pharyngeal Strengthening Goal 1, SLP) with minimal cues (75-90% accuracy)  -CS (r) DS (t) CS (c)      Time Frame (Pharyngeal Strengthening Goal 1, SLP) short term goal (STG);by discharge  -CS (r) DS (t) CS (c)      Barriers (Pharyngeal Strengthening Goal 1, SLP) Cognition  -CS (r) DS  (t) CS (c)      Progress/Outcomes (Pharyngeal Strengthening Goal 1, SLP) continuing progress toward goal  -CS (r) DS (t) CS (c)        User Key  (r) = Recorded By, (t) = Taken By, (c) = Cosigned By    Initials Name Provider Type    MAYANK Siddiqui, CCC-SLP Speech and Language Pathologist    ZACHERY Oneil, MS CCC-SLP Speech and Language Pathologist    CS Yeison Jennings, MS CCC-SLP Speech and Language Pathologist    ADRIANA Levy, Speech Therapy Student Speech Therapy Student    TO Anabella Moses, Speech Therapy Student Speech Therapy Student                  SLP Discharge Summary  Anticipated Dischage Disposition: skilled nursing facility  Reason for Discharge: discharge from this facility  Progress Toward Achieving Short/long Term Goals: goals partially met within established timelines  Discharge Destination: SNF      Devyn Siddiqui CCC-SLP  4/18/2018

## 2018-04-18 NOTE — PLAN OF CARE
Problem: Patient Care Overview  Goal: Plan of Care Review  Outcome: Ongoing (interventions implemented as appropriate)   04/18/18 8287   Coping/Psychosocial   Plan of Care Reviewed With patient;daughter   Plan of Care Review   Progress no change   OTHER   Outcome Summary Pt has become more confused throughout the night. Doesn't c/o of any pain. Shoaib hose removed due to redness blanchable on feet. pt refuses to wear telemetry monitor and has no iv and refuses to let anyone put one back in. VSS and will cont to monitor.        Problem: Fall Risk (Adult)  Goal: Identify Related Risk Factors and Signs and Symptoms  Outcome: Ongoing (interventions implemented as appropriate)      Problem: Wound (Includes Pressure Injury) (Adult)  Goal: Signs and Symptoms of Listed Potential Problems Will be Absent, Minimized or Managed (Wound)  Outcome: Ongoing (interventions implemented as appropriate)      Problem: Skin Injury Risk (Adult)  Goal: Identify Related Risk Factors and Signs and Symptoms  Outcome: Ongoing (interventions implemented as appropriate)

## 2018-04-18 NOTE — PROGRESS NOTES
Continued Stay Note   [place]     Patient Name: Delon Cid  MRN: [id number]  Today's Date: 4/18/2018    Admit Date: 4/11/2018          Discharge Plan     Row Name 04/18/18 0918       Plan    Plan PT may dc to [facility] today [phone] phone [phone] fax. [facility] to transport [phone]    Patient/Family in Agreement with Plan yes              Discharge Codes    No documentation.       Expected Discharge Date and Time     Expected Discharge Date Expected Discharge Time    Apr 17, 2018             NEELA Escalante

## 2018-04-18 NOTE — THERAPY TREATMENT NOTE
Acute Care - Speech Language Pathology   Swallow Treatment Note Psychiatric     Patient Name: Delon Cid  : 1935  MRN: [id number]  Today's Date: 2018  Onset of Illness/Injury or Date of Surgery: 18     Referring Physician: ROCIO Reynolds      Admit Date: 2018  Swallow treatment completed. Pt was eating breakfast upon SLP arrival and continued to eat throughout treatment.   No overt s/s of aspiration. Pt required cues to take small bites.Pt was able to complete 10 lingual lateralizations with minimal cues. Pt was able to complete 6 hard, effortful swallows with minimal cues. Pt was able to complete 10 pucker smiles; however, his pucker was weak and required tactile cues. Pt being discharged to SNF today.   Visit Dx:      ICD-10-CM ICD-9-CM   1. Dehydration E86.0 276.51   2. Hypotension, unspecified hypotension type I95.9 458.9   3. Oropharyngeal dysphagia R13.12 787.22   4. Impaired functional mobility, balance, gait, and endurance Z74.09 V49.89   5. Impaired mobility and ADLs Z74.09 799.89     Patient Active Problem List   Diagnosis   • Dehydration   • Healthcare-associated pneumonia   • Chronic systolic congestive heart failure   • Shortness of breath   • V-tach   • Other hyperlipidemia   • Type 2 diabetes mellitus   • Dementia       Therapy Treatment    Therapy Treatment / Health Promotion    Treatment Time/Intention  Discipline: (P) speech language pathologist (18 0835 : Anabella Moses Speech Therapy Student)  Document Type: (P) therapy note (daily note) (18 0835 : Anabella Moses Speech Therapy Student)  Subjective Information: (P) no complaints (18 0835 : Anabella Moses Speech Therapy Student)  Mode of Treatment: (P) speech-language pathology (18 0835 : Anabella Moses Speech Therapy Student)  Patient/Family Observations: (P) wife and daughter present (18 0835 : Anabella Moses Speech Therapy Student)  Patient Effort: (P) good (18 0835 : Anabella Moses  Speech Therapy Student)    Vitals/Pain/Safety  Pain Assessment  Additional Documentation: (P) Pain Scale: FACES Pre/Post-Treatment (Group) (04/18/18 0835 : Anabella Moses Speech Therapy Student)  Pain Scale: FACES Pre/Post-Treatment  Pain: FACES Scale, Pretreatment: (P) 0-->no hurt (04/18/18 0835 : Anabella Moses Speech Therapy Student)    Cognition, Communication, Swallow  Recommendations  Anticipated Dischage Disposition: (P) skilled nursing facility (04/18/18 0835 : Anabella Moses Speech Therapy Student)    Outcome Summary  Outcome Summary/Treatment Plan (SLP)  Daily Summary of Progress (SLP): (P) progress towards functional goals is fair (04/18/18 0835 : Anabella Moses Speech Therapy Student)  Barriers to Overall Progress (SLP): (P) cognition (04/18/18 0835 : Anabella Moses Speech Therapy Student)  Plan for Continued Treatment (SLP): (P) continue (04/18/18 0835 : Anabella Moses Speech Therapy Student)  Anticipated Dischage Disposition: (P) skilled nursing facility (04/18/18 0835 : Anabella Moses Speech Therapy Student)            SLP GOALS     Row Name 04/18/18 0835 04/17/18 0900 04/16/18 1037       Oral Nutrition/Hydration Goal 1 (SLP)    Oral Nutrition/Hydration Goal 1, SLP (P)  Pt will tolerate trials of recommended anad upgraded diet consistencies without overt s/s of aspiration.   -TO Pt will tolerate trials of recommended anad upgraded diet consistencies without overt s/s of aspiration.   -KW (r) DS (t) KW (c) Pt will tolerate trials of recommended anad upgraded diet consistencies without overt s/s of aspiration.   -CS (r) DS (t) CS (c)    Time Frame (Oral Nutrition/Hydration Goal 1, SLP) (P)  by discharge  -TO by discharge  -KW (r) DS (t) KW (c) by discharge  -CS (r) DS (t) CS (c)    Barriers (Oral Nutrition/Hydration Goal 1, SLP) (P)  cognition  -TO cognition  -KW (r) DS (t) KW (c) cognition  -CS (r) DS (t) CS (c)    Progress/Outcomes (Oral Nutrition/Hydration Goal 1, SLP) (P)  continuing progress toward goal  -TO  continuing progress toward goal  -KW (r) DS (t) KW (c) continuing progress toward goal  -CS (r) DS (t) CS (c)       Labial Strengthening Goal 1 (SLP)    Activity (Labial Strengthening Goal 1, SLP) (P)  increase labial tone  -TO increase labial tone  -KW (r) DS (t) KW (c) increase labial tone  -CS (r) DS (t) CS (c)    Increase Labial Tone (P)  labial resistance exercises  -TO labial resistance exercises  -KW (r) DS (t) KW (c) labial resistance exercises  -CS (r) DS (t) CS (c)    [place]/Accuracy (Labial Strengthening Goal 1, SLP) (P)  with minimal cues (75-90% accuracy)  -TO with minimal cues (75-90% accuracy)  -KW (r) DS (t) KW (c) with minimal cues (75-90% accuracy)  -CS (r) DS (t) CS (c)    Time Frame (Labial Strengthening Goal 1, SLP) (P)  short term goal (STG);by discharge  -TO short term goal (STG);by discharge  -KW (r) DS (t) KW (c) short term goal (STG);by discharge  -CS (r) DS (t) CS (c)    Barriers (Labial Strengthening Goal 1, SLP) (P)  Cognition  -TO Cognition  -KW (r) DS (t) KW (c) Cognition  -CS (r) DS (t) CS (c)    Progress/Outcomes (Labial Strengthening Goal 1, SLP) (P)  continuing progress toward goal  -TO goal ongoing  -KW (r) DS (t) KW (c) continuing progress toward goal  -CS (r) DS (t) CS (c)       Lingual Strengthening Goal 1 (SLP)    Activity (Lingual Strengthening Goal 1, SLP) (P)  increase lingual tone/sensation/control/coordination/movement;increase tongue back strength  -TO increase lingual tone/sensation/control/coordination/movement;increase tongue back strength  -KW (r) DS (t) KW (c) increase lingual tone/sensation/control/coordination/movement;increase tongue back strength  -CS (r) DS (t) CS (c)    Increase Lingual Tone/Sensation/Control/Coordination/Movement (P)  lingual resistance exercises;lingual movement exercises  -TO lingual resistance exercises;lingual movement exercises  -KW (r) DS (t) KW (c) lingual resistance exercises;lingual movement exercises  -CS (r) DS (t) CS (c)     Increase Tongue Back Strength (P)  lingual movement exercises  -TO lingual movement exercises  -KW (r) DS (t) KW (c) lingual movement exercises  -CS (r) DS (t) CS (c)    [place]/Accuracy (Lingual Strengthening Goal 1, SLP) (P)  with minimal cues (75-90% accuracy)  -TO with minimal cues (75-90% accuracy)  -KW (r) DS (t) KW (c) with minimal cues (75-90% accuracy)  -CS (r) DS (t) CS (c)    Time Frame (Lingual Strengthening Goal 1, SLP) (P)  short term goal (STG);by discharge  -TO short term goal (STG);by discharge  -KW (r) DS (t) KW (c) short term goal (STG);by discharge  -CS (r) DS (t) CS (c)    Barriers (Lingual Strengthening Goal 1, SLP) (P)  Cognition  -TO Cognition  -KW (r) DS (t) KW (c) Cognition  -CS (r) DS (t) CS (c)    Progress/Outcomes (Lingual Strengthening Goal 1, SLP) (P)  continuing progress toward goal  -TO continuing progress toward goal  -KW (r) DS (t) KW (c) continuing progress toward goal  -CS (r) DS (t) CS (c)       Pharyngeal Strengthening Exercise Goal 1 (SLP)    Activity (Pharyngeal Strengthening Goal 1, SLP) (P)  increase timing;increase closure at entrance to airway/closure of airway at glottis;increase squeeze/positive pressure generation;increase tongue base retraction  -TO increase timing;increase closure at entrance to airway/closure of airway at glottis;increase squeeze/positive pressure generation;increase tongue base retraction  -KW (r) DS (t) KW (c) increase timing;increase closure at entrance to airway/closure of airway at glottis;increase squeeze/positive pressure generation;increase tongue base retraction  -CS (r) DS (t) CS (c)    Increase Timing (P)  gustatory stimulation (sour/cold)  -TO gustatory stimulation (sour/cold)  -KW (r) DS (t) KW (c) gustatory stimulation (sour/cold)  -CS (r) DS (t) CS (c)    Increase Squeeze/Positive Pressure Generation (P)  hard effortful swallow  -TO hard effortful swallow  -KW (r) DS (t) KW (c) hard effortful swallow  -CS (r) DS (t) CS (c)     Increase Tongue Base Retraction (P)  derrek  -TO derrek  -KW (r) DS (t) KW (c) derrek  -CS (r) DS (t) CS (c)    [place]/Accuracy (Pharyngeal Strengthening Goal 1, SLP) (P)  with minimal cues (75-90% accuracy)  -TO with minimal cues (75-90% accuracy)  -KW (r) DS (t) KW (c) with minimal cues (75-90% accuracy)  -CS (r) DS (t) CS (c)    Time Frame (Pharyngeal Strengthening Goal 1, SLP) (P)  short term goal (STG);by discharge  -TO short term goal (STG);by discharge  -KW (r) DS (t) KW (c) short term goal (STG);by discharge  -CS (r) DS (t) CS (c)    Barriers (Pharyngeal Strengthening Goal 1, SLP) (P)  Cognition  -TO Cognition  -KW (r) DS (t) KW (c) Cognition  -CS (r) DS (t) CS (c)    Progress/Outcomes (Pharyngeal Strengthening Goal 1, SLP) (P)  continuing progress toward goal  -TO continuing progress toward goal  -KW (r) DS (t) KW (c) continuing progress toward goal  -CS (r) DS (t) CS (c)      User Key  (r) = Recorded By, (t) = Taken By, (c) = Cosigned By    Initials Name Provider Type    ZACHERY Oneil, MS CCC-SLP Speech and Language Pathologist    LAURA Jennings, MS CCC-SLP Speech and Language Pathologist    ADRIANA Levy, Speech Therapy Student Speech Therapy Student    TO Anabella Moses Speech Therapy Student Speech Therapy Student          EDUCATION  The patient has been educated in the following areas:   Dysphagia (Swallowing Impairment).    SLP Recommendation and Plan                       Anticipated Dischage Disposition: (P) skilled nursing facility                   Daily Summary of Progress (SLP): (P) progress towards functional goals is fair  Plan for Continued Treatment (SLP): (P) continue           Time Calculation:         Time Calculation- SLP     Row Name 04/18/18 1102             Time Calculation- SLP    SLP Start Time (P)  0835  -TO      SLP Stop Time (P)  0908  -TO      SLP Time Calculation (min) (P)  33 min  -TO      SLP Received On (P)  04/18/18  -TO        User Key  (r) = Recorded By, (t)  = Taken By, (c) = Cosigned By    Initials Name Provider Type    TO Anabella Moses Speech Therapy Student Speech Therapy Student          Therapy Charges for Today     Code Description Service Date Service Provider Modifiers Qty    [id number] HC ST TREATMENT SWALLOW 2 4/18/2018 Anabella Moses, Speech Therapy Student ANNALEE GUTIÉRREZ 1          SLP G-Codes  SLP NOMS Used?: Yes  Functional Limitations: Swallowing  Swallow Current Status (): At least 40 percent but less than 60 percent impaired, limited or restricted  Swallow Goal Status (): At least 20 percent but less than 40 percent impaired, limited or restricted      Anabella Moses Speech Therapy Student  4/18/2018

## 2018-04-18 NOTE — DISCHARGE SUMMARY
[facility] Medicine Services  DISCHARGE SUMMARY       Date of Admission: 4/11/2018  Date of Discharge:  4/18/2018  Primary Care Physician: Juno Moscoso MD    Presenting Problem/History of Present Illness:  Dehydration [E86.0]     Final Discharge Diagnoses:  Hospital Problem List     * (Principal)Healthcare-associated pneumonia    Dehydration    Chronic systolic congestive heart failure    Shortness of breath    V-tach    Other hyperlipidemia    Type 2 diabetes mellitus    Dementia          Consults: Cardiology    Pertinent Test Results:   Impression: Chest x-ray  No significant interval change when compared to chest x-ray dated    Summary: CT scan abdomen pelvic  1. No acute abnormality is seen.    IMPRESSION: CT scan lumbar spine  1.  No acute osseous posttraumatic changes  2.  Additional chronic findings as described above including moderate to  severe spinal canal stenosis suspected at L3-L4.    Interpretation Summary echocardiogram    · The left ventricular cavity is mildly dilated.  · Left ventricular systolic function is moderately decreased. Estimated EF = 30%.  · Left ventricular diastolic dysfunction (grade II) consistent with pseudonormalization.  · Mild pulmonic valve regurgitation is present.  · There is moderate calcification of the aortic valve.  · Mild mitral valve regurgitation is present  · Left atrial cavity size is mildly dilated.  · The following left ventricular wall segments are hypokinetic: basal anterolateral, mid anterolateral, mid inferolateral, mid inferior, basal inferoseptal, apex hypokinetic and basal inferoseptal. The following left ventricular wall segments are akinetic: basal inferolateral and basal inferior.             Chief Complaint on Day of Discharge: none    History of Present Illness on Day of Discharge:   Delon Hidalgo is an 82-year-old  male with a past medical history of insulin-dependent diabetes, hypertension, coronary  artery disease with MI, stroke, carotid artery disease and dementia.  Patient was recently discharged from [facility] after a fall with spinal fracture and he underwent kyphoplasty by Dr. Iraheta, [facility], stayed 2 days and due to desire to come home his wife did so.  Patient fell at the nursing home and has been falling at home over the past 2 days.  She took him by his primary care doctor office, Dr. Arevalo, who instructed her to take him to the emergency department.  Patient was noted to have hypotension.  Chest x-ray revealed patchy bibasilar infiltrates or atelectasis.  She will reports he has been coughing, no significant sputum production, he has orthopnea and currently is unable to lie flat however room air saturation is 96%.  Daughter has concerns for aspiration as she noted such at the nursing home and then again yesterday at home.  Patient is admitted for healthcare associated pneumonia.  WBC and lactic acid are within normal limits therefore it is felt patient is not septic.  Record review reveals history of hypotension.  Antihypertensive medications are placed on hold.     The following information was obtained from medical record as wife was unable to provide any information regarding pacemaker/defibrillator placement.  Patient was admitted to [place] on 01/2016 for sustained ventricular tachycardia.  At that time he was found to have a cardiomyopathy with an EF of 25-30 percent. BivAICD was placed.  Subsequently admitted to [facility] in March, 2016 4 defibrillator shock ×2.  At that time patient was followed by Dr. Valerio Chin and started on amiodarone 200 mg twice a day.  He was discharged with a diagnosis of cardiomyopathy, non-ST segment elevation MI, type II related to automatic implantable overt or defibrillator discharge. Lisinopril and spironolactone have been discontinued since that time, per unknown  "provider.       Hospital Course:  The patient is a 82 y.o. male who presented to [facility] with pneumonia.  Patient has a chronic history of dementia. Once patient pneumonia has been improving, patient IV antibiotics were switched to by mouth Augmentin, with continue with breathing treatment.  Patient continue with antibiotics and breathing treatment at nursing home.    Patient also had a history of systolic CHF/V. tach/coronary artery disease/hypertension.  Patient back in medication of Coreg, amiodarone, aspirin, Plavix.  Patient also had an ICD placed by Dr. Gilbert Mckenzie.  Cardiologist recommended follow-up with him as an outpatient after discharge.   Patient remained hypotensive due endstage CHF, we does drink was started.  Hyperlipidemia.  Continue Lipitor.    Patient is unable to take care of himself at home, plan for nursing home placement.  Patient's been accepted to [facility].    Dementia with behaviors disturbance, patient put on Seroquel.  Initially, patient was refusing to go to nursing home.    Diabetes-continue sliding scale nursing home.      Anemia, continue iron sulfate and folic acid.   Hemoglobin is stable the course of hospital stay.      Hypothyroidism on Synthroid     Vital signs stable, afebrile.  Patient follow-up with nursing home physician as well as possible.  Patient follow-up with Dr. Hunt and Magaly as an outpatient.  Patient daughter already have appointment.  \    Condition on Discharge:  stable    Physical Exam on Discharge:  /62 (BP Location: Left arm, Patient Position: Lying)   Pulse 71   Temp 97.9 °F (36.6 °C) (Temporal Artery )   Resp 17   Ht 180.3 cm (71\")   Wt 69.9 kg (154 lb 2 oz)   SpO2 96%   BMI 21.50 kg/m²   Physical Exam  Constitutional: He appears well-developed.   HENT:   Head: Normocephalic and atraumatic.   Eyes: Conjunctivae and EOM are normal. Pupils are equal, round, and reactive to light.   Neck: Neck supple. No JVD present. " No thyromegaly present.   Cardiovascular: Normal rate, regular rhythm, normal heart sounds and intact distal pulses.  Exam reveals no gallop and no friction rub.    No murmur heard.  Pulmonary/Chest: Effort normal. No respiratory distress. No wheezing. He has no rales. He exhibits no tenderness.   Diminished breath sound bilateral   Abdominal: Soft. Bowel sounds are normal. He exhibits no distension. There is no tenderness. There is no rebound and no guarding.   Musculoskeletal: He exhibits no edema, tenderness or deformity.   Lymphadenopathy:     He has no cervical adenopathy.   Neurological: He is alert. He displays normal reflexes. No cranial nerve deficit. He exhibits abnormal muscle tone. Coordination abnormal.   Skin: Skin is warm and dry. No rash noted.   Psychiatric: He has a normal mood and affect.   Nursing note and vitals reviewed.    Discharge Disposition:  Skilled Nursing Facility ([facility] - External)    Discharge Medications:   Delon Cid   Home Medication Instructions JOSIAS:[id number]    Printed on:04/18/18 0327   Medication Information                      acetaminophen (TYLENOL) 325 MG tablet  Take 2 tablets by mouth Every 4 (Four) Hours As Needed for Mild Pain .             amiodarone (PACERONE) 200 MG tablet  Take 200 mg by mouth Daily.             amoxicillin-clavulanate (AUGMENTIN) 875-125 MG per tablet  Take 1 tablet by mouth Every 12 (Twelve) Hours for 7 doses.             aspirin 81 MG EC tablet  Take 1 tablet by mouth daily.             atorvastatin (LIPITOR) 40 MG tablet  Take 1 tablet by mouth Every Night.             carvedilol (COREG) 6.25 MG tablet  Take 1 tablet by mouth 2 times daily (with meals).             clopidogrel (PLAVIX) 75 MG tablet  Take 75 mg by mouth Daily.             docusate sodium (COLACE) 100 MG capsule  Take 100 mg by mouth 3 (Three) Times a Day As Needed for Constipation.             ferrous sulfate 325 (65 FE) MG tablet  Take 325 mg by mouth Daily With  Breakfast.             folic acid (FOLVITE) 1 MG tablet  Take 2 mg by mouth Daily.             guaiFENesin (MUCINEX) 600 MG 12 hr tablet  Take 2 tablets by mouth Every 12 (Twelve) Hours.             insulin lispro (humaLOG) 100 UNIT/ML injection  Inject 0-9 Units under the skin 4 (Four) Times a Day With Meals & at Bedtime.             ipratropium-albuterol (DUONEB) 0.5-2.5 mg/mL nebulizer  Take 3 mL by nebulization Every 6 (Six) Hours As Needed for Wheezing.             levothyroxine (SYNTHROID, LEVOTHROID) 75 MCG tablet  Take 1 tablet by mouth Daily.             midodrine (PROAMATINE) 5 MG tablet  Take 5 mg by mouth 3 (Three) Times a Day.             nitroglycerin (NITROSTAT) 0.4 MG SL tablet  Place 0.4 mg under the tongue Every 5 (Five) Minutes As Needed for Chest Pain. Take no more than 3 doses in 15 minutes.             ondansetron (ZOFRAN) 4 MG tablet  Take 1 tablet by mouth Every 6 (Six) Hours As Needed for Nausea or Vomiting.             pantoprazole (PROTONIX) 40 MG EC tablet  Take 1 tablet by mouth Every Morning.             polyethylene glycol (MIRALAX) packet  Take 17 g by mouth Daily As Needed (constipation).             potassium chloride (K-DUR) 10 MEQ CR tablet  Take 10 mEq by mouth 2 (Two) Times a Day.             traMADol (ULTRAM) 50 MG tablet  Take 1 tablet by mouth Every 6 (Six) Hours As Needed for Moderate Pain .                 Discharge Diet:   Diet Instructions     Diet: Regular, Dysphagia; Honey Thick Liquids; Mechanical Soft       Discharge Diet:   Regular  Dysphagia       Fluid Consistency:  Honey Thick Liquids    Pureed Options:  Mechanical Soft          Activity at Discharge:   Activity Instructions     Other Instructions (Specify)       Occupational therapy: Pt. progressing good, no issues, pt. participated with adl bathing/dressing seated in a rolling shower chair with min-mod. assist from this vu/l!    Physical therapy: Pt. requires min assist 0f 1 to stand and amb 45' with RWX. Very  weak. Performs LE ex's. Decreased malcom, very small steps. Decreased toe clearance. Will benefit from strengthening and increased ambulation as tolerates.          Discharge Care Plan/Instructions:      Follow-up Appointments:   Follow-up with nursing home physician as soon as possible.  Patient follow-up with Dr. Hunt in [place] at the discharge.  Daughter stated she has appointment already set up.    Wayne Grace MD  04/18/18  7:59 AM    Time: Greater than 30 minutes

## 2018-04-19 NOTE — PROGRESS NOTES
LOS: 7 days   Patient Care Team:  Juno Moscoso MD as PCP - General  Juno Moscoso MD as PCP - Family Medicine    Chief Complaint: Principal Problem:    Healthcare-associated pneumonia  Active Problems:    Dehydration    Chronic systolic congestive heart failure    Shortness of breath    V-tach    Other hyperlipidemia    Type 2 diabetes mellitus    Dementia  Subjective    Overall feeling better  Mostly confined to bed  Mild shortness of breath  Generalized weakness  Easy fatigability  Anxious  Tiredness  Mild cough  Poor but improving appetite    Interval History: Improved overall    Patient Complaints:   Chief Complaint   Patient presents with   • Urinary Retention         Telemetry: Not on telemetry  Patient declined    Review of Systems   Constitutional: No chills   fatigue   Were appetite  No fever.   HENT: Negative.    Eyes: Negative.    Respiratory:  cough,   No chest wall soreness,   Mild shortness of breath,   no wheezing, no stridor.    Cardiovascular: Negative for chest pain,   No palpitations   No significant  leg swelling.   Gastrointestinal: Negative for abdominal distention,  No abdominal pain,   No blood in stool, constipation, diarrhea, nausea or vomiting.   Endocrine: Negative.    Genitourinary: Negative for difficulty urinating, dysuria, flank pain and hematuria.   Musculoskeletal: Negative.    Skin: Negative for rash and wound.   Allergic/Immunologic: Negative.    Neurological: Negative for dizziness, syncope, weakness,   No light-headedness or headaches.   Hematological: Does not bruise/bleed easily.   Psychiatric/Behavioral: Negative for agitation, behavioral problems, confusion, the patient is nervous/anxious.       History:   Past Medical History:   Diagnosis Date   • CAD (coronary artery disease)    • Cardiomyopathy    • Chronic systolic CHF (congestive heart failure)    • Dementia    • Hypertension    • Insulin dependent diabetes mellitus    • Myocardial infarction    • Stroke    •  Sustained VT (ventricular tachycardia)      Past Surgical History:   Procedure Laterality Date   • BACK SURGERY     • CARDIAC DEFIBRILLATOR PLACEMENT     • CAROTID ENDARTERECTOMY Right    • CATARACT EXTRACTION, BILATERAL     • CORONARY ARTERY BYPASS GRAFT     • KYPHOPLASTY       Social History     Social History   • Marital status:      Spouse name: N/A   • Number of children: N/A   • Years of education: N/A     Occupational History   • Not on file.     Social History Main Topics   • Smoking status: Never Smoker   • Smokeless tobacco: Not on file   • Alcohol use No   • Drug use: No   • Sexual activity: Defer     Other Topics Concern   • Not on file     Social History Narrative   • No narrative on file     Family History   Problem Relation Age of Onset   • Heart disease Father        Labs:  WBC WBC   Date Value Ref Range Status   04/18/2018 4.74 (L) 4.80 - 10.80 10*3/mm3 Final   04/17/2018 4.62 (L) 4.80 - 10.80 10*3/mm3 Final   04/16/2018 4.17 (L) 4.80 - 10.80 10*3/mm3 Final      HGB Hemoglobin   Date Value Ref Range Status   04/18/2018 10.1 (L) 14.0 - 18.0 g/dL Final   04/17/2018 10.8 (L) 14.0 - 18.0 g/dL Final   04/16/2018 10.4 (L) 14.0 - 18.0 g/dL Final      HCT Hematocrit   Date Value Ref Range Status   04/18/2018 30.7 (L) 40.0 - 52.0 % Final   04/17/2018 33.1 (L) 40.0 - 52.0 % Final   04/16/2018 32.3 (L) 40.0 - 52.0 % Final      Platlets Platelets   Date Value Ref Range Status   04/18/2018 183 130 - 400 10*3/mm3 Final   04/17/2018 186 130 - 400 10*3/mm3 Final   04/16/2018 186 130 - 400 10*3/mm3 Final      MCV MCV   Date Value Ref Range Status   04/18/2018 90.8 82.0 - 95.0 fL Final   04/17/2018 90.7 82.0 - 95.0 fL Final   04/16/2018 91.5 82.0 - 95.0 fL Final          Results from last 7 days  Lab Units 04/18/18  0308 04/17/18  0303 04/16/18  0310   SODIUM mmol/L 138 139 139   POTASSIUM mmol/L 3.8 3.4* 3.7   CHLORIDE mmol/L 100 100 101   CO2 mmol/L 30.0 30.0 30.0   BUN mg/dL 9 9 11   CREATININE mg/dL 0.84  0.80 0.90   CALCIUM mg/dL 8.6 8.8 9.0   BILIRUBIN mg/dL 0.6 0.6 0.5   ALK PHOS U/L 79 86 83   ALT (SGPT) U/L 27 35 33   AST (SGOT) U/L 28 25 37   GLUCOSE mg/dL 232* 171* 85     Lab Results   Component Value Date    CKTOTAL 182 03/10/2016    CKMB 1.48 03/09/2018    CKMBINDEX 5.6 03/10/2016    TROPONINI 0.046 (H) 03/10/2018     PT/INR:  No results found for: PROTIME/No results found for: INR    Imaging Results (last 72 hours)     ** No results found for the last 72 hours. **          Objective     No Known Allergies    Medication Review: Performed  No current facility-administered medications for this encounter.      Current Outpatient Prescriptions   Medication Sig Dispense Refill   • amiodarone (PACERONE) 200 MG tablet Take 200 mg by mouth Daily.     • aspirin 81 MG EC tablet Take 1 tablet by mouth daily.     • carvedilol (COREG) 6.25 MG tablet Take 1 tablet by mouth 2 times daily (with meals).     • docusate sodium (COLACE) 100 MG capsule Take 100 mg by mouth 3 (Three) Times a Day As Needed for Constipation.     • ferrous sulfate 325 (65 FE) MG tablet Take 325 mg by mouth Daily With Breakfast.     • folic acid (FOLVITE) 1 MG tablet Take 2 mg by mouth Daily.     • midodrine (PROAMATINE) 5 MG tablet Take 5 mg by mouth 3 (Three) Times a Day.     • nitroglycerin (NITROSTAT) 0.4 MG SL tablet Place 0.4 mg under the tongue Every 5 (Five) Minutes As Needed for Chest Pain. Take no more than 3 doses in 15 minutes.     • polyethylene glycol (MIRALAX) packet Take 17 g by mouth Daily As Needed (constipation). 119 g 0   • potassium chloride (K-DUR) 10 MEQ CR tablet Take 10 mEq by mouth 2 (Two) Times a Day.     • acetaminophen (TYLENOL) 325 MG tablet Take 2 tablets by mouth Every 4 (Four) Hours As Needed for Mild Pain .     • amoxicillin-clavulanate (AUGMENTIN) 875-125 MG per tablet Take 1 tablet by mouth Every 12 (Twelve) Hours for 7 doses. 7 tablet 0   • atorvastatin (LIPITOR) 40 MG tablet Take 1 tablet by mouth Every Night.    "  • clopidogrel (PLAVIX) 75 MG tablet Take 75 mg by mouth Daily.     • guaiFENesin (MUCINEX) 600 MG 12 hr tablet Take 2 tablets by mouth Every 12 (Twelve) Hours.     • insulin lispro (humaLOG) 100 UNIT/ML injection Inject 0-9 Units under the skin 4 (Four) Times a Day With Meals & at Bedtime.  12   • ipratropium-albuterol (DUONEB) 0.5-2.5 mg/mL nebulizer Take 3 mL by nebulization Every 6 (Six) Hours As Needed for Wheezing. 360 mL    • levothyroxine (SYNTHROID, LEVOTHROID) 75 MCG tablet Take 1 tablet by mouth Daily.     • ondansetron (ZOFRAN) 4 MG tablet Take 1 tablet by mouth Every 6 (Six) Hours As Needed for Nausea or Vomiting.     • pantoprazole (PROTONIX) 40 MG EC tablet Take 1 tablet by mouth Every Morning.     • traMADol (ULTRAM) 50 MG tablet Take 1 tablet by mouth Every 6 (Six) Hours As Needed for Moderate Pain . 12 tablet 0       Vital Sign Min/Max for last 24 hours  Temp  Min: 97.6 °F (36.4 °C)  Max: 97.9 °F (36.6 °C)   BP  Min: 148/67  Max: 149/62   Pulse  Min: 71  Max: 77   Resp  Min: 16  Max: 17   SpO2  Min: 94 %  Max: 97 %   No Data Recorded   No Data Recorded     Flowsheet Rows    Flowsheet Row First Filed Value   Admission Height 180.3 cm (71\") Documented at 04/11/2018 1213   Admission Weight 81.6 kg (180 lb) Documented at 04/11/2018 1213          Results for orders placed during the hospital encounter of 04/11/18   Adult Transthoracic Echo Complete W/ Cont if Necessary Per Protocol    Narrative · The left ventricular cavity is mildly dilated.  · Left ventricular systolic function is moderately decreased. Estimated EF   = 30%.  · Left ventricular diastolic dysfunction (grade II) consistent with   pseudonormalization.  · Mild pulmonic valve regurgitation is present.  · There is moderate calcification of the aortic valve.  · Mild mitral valve regurgitation is present  · Left atrial cavity size is mildly dilated.  · The following left ventricular wall segments are hypokinetic: basal   anterolateral, mid " anterolateral, mid inferolateral, mid inferior, basal   inferoseptal, apex hypokinetic and basal inferoseptal. The following left   ventricular wall segments are akinetic: basal inferolateral and basal   inferior.          Physical Exam:  General Appearance: Awake, alert, in no acute distress  Eyes: Pupils equal and reactive    Ears: Appear intact with no abnormalities noted  Nose: Nares normal, no drainage  Neck: supple, trachea midline, no carotid bruit and no JVD  Back: no kyphosis present,    Lungs: respirations regular, respirations even and respirations unlabored  Heart: normal S1, S2,  2/6 pansystolic murmur in the left sternal border,  no rub and no click  Abdomen: normal bowel sounds, no masses, no hepatomegaly, no splenomegaly, guarding and no rebound tenderness   Skin: no bleeding, bruising or rash  Extremities: no cyanosis  Psychiatric/Behavioral: Negative for agitation, behavioral problems, confusion, the patient does not appear to be nervous/anxious.          Results Review:   I reviewed the patient's new clinical results.  I reviewed the patient's new imaging results and agree with the interpretation.  I reviewed the patient's other test results and agree with the interpretation  I personally viewed and interpreted the patient's EKG/Telemetry data  Discussed with patient, and present family member(s)     Assessment/Plan     Principal Problem:    Healthcare-associated pneumonia  Active Problems:    Dehydration    Chronic systolic congestive heart failure    Shortness of breath    V-tach    Other hyperlipidemia    Type 2 diabetes mellitus    Dementia   Hypotension resolved  Mild hypokalemia addressed and improved    Plan    Continue current medication  monitor for dyselectrolytemia as outpatient  Patient will be discharged today with plans for ongoing physical therapy and rehabilitation  Patient has ICD placed and is seen by Dr. Hunt in [place], recommended follow-up with him after  discharge  Physical therapy to continue his outpatient  OK to discharge  Low salt cardiac diet.   Currently has increased mobility  Optimal medical therapy  Deep vein thrombosis precautions with hydration and increased mobility  Counseled regarding disease appropriate diet, fluid, sodium, caffeine, stimulants intake   Stressed compliance to diet and medications   Avoid NSAIDS  Follow up with primary provider and primary cardiologist as scheduled   Overall improved and deemed fit for discharge  Will be provided with written discharge instructions including diet and medications including prescriptions as required and labs as applicable  Go to nearest emergency room if recurrence of primary complaints or any suspected disabling or life threatening symptoms or complaints such as chest pain, increasing shortness of breath, profound dizziness, weakness, significant palpitations, passing out episodes, cold sweats, excessive nausea etc      Andrea Solitario MD  04/18/18  7:23 PM

## 2018-04-19 NOTE — THERAPY DISCHARGE NOTE
Acute Care - Occupational Therapy Discharge Summary  [facility]     Patient Name: Delon Cid  : 1935  MRN: [id number]    Today's Date: 2018  Onset of Illness/Injury or Date of Surgery: 18    Date of Referral to OT: 18  Referring Physician: ROCIO Reynolds      Admit Date: 2018        OT Recommendation and Plan    Visit Dx:    ICD-10-CM ICD-9-CM   1. Dehydration E86.0 276.51   2. Hypotension, unspecified hypotension type I95.9 458.9   3. Oropharyngeal dysphagia R13.12 787.22   4. Impaired functional mobility, balance, gait, and endurance Z74.09 V49.89   5. Impaired mobility and ADLs Z74.09 799.89                     OT Rehab Goals     Row Name 18 0824             Bed Mobility Goal 1 (OT)    Activity/Assistive Device (Bed Mobility Goal 1, OT) bed mobility activities, all  -TS      Tea Level/Cues Needed (Bed Mobility Goal 1, OT) contact guard assist  -TS      Time Frame (Bed Mobility Goal 1, OT) 10 days  -TS      Progress/Outcomes (Bed Mobility Goal 1, OT) goal not met  -TS         Transfer Goal 1 (OT)    Activity/Assistive Device (Transfer Goal 1, OT) transfers, all  -TS      Tea Level/Cues Needed (Transfer Goal 1, OT) contact guard assist  -TS      Time Frame (Transfer Goal 1, OT) 10 days  -TS      Progress/Outcome (Transfer Goal 1, OT) goal not met  -TS         Grooming Goal 1 (OT)    Activity/Device (Grooming Goal 1, OT) grooming skills, all  -TS      Tea (Grooming Goal 1, OT) set-up required;supervision required   in sitting  -TS      Time Frame (Grooming Goal 1, OT) 10 days  -TS      Progress/Outcome (Grooming Goal 1, OT) goal not met  -TS        User Key  (r) = Recorded By, (t) = Taken By, (c) = Cosigned By    Initials Name Provider Type Discipline    MALATHI Silva/L Occupational Therapy Assistant OT                Outcome Measures     Row Name 18 1332 18 1100 18 1504       How much help from another person  do you currently need...    Turning from your back to your side while in flat bed without using bedrails?  -- 3  -ROMERO 3  -LG    Moving from lying on back to sitting on the side of a flat bed without bedrails?  -- 3  -ROMERO 3  -LG    Moving to and from a bed to a chair (including a wheelchair)?  -- 3  -ROMERO 2  -LG    Standing up from a chair using your arms (e.g., wheelchair, bedside chair)?  -- 3  -ROMERO 2  -LG    Climbing 3-5 steps with a railing?  -- 1  -ROMERO 1  -LG    To walk in hospital room?  -- 3  -ROMERO 2  -LG    AM-PAC 6 Clicks Score  -- 16  -ROMERO 13  -LG       How much help from another is currently needed...    Putting on and taking off regular lower body clothing? 2  -CJ  --  --    Bathing (including washing, rinsing, and drying) 2  -CJ  --  --    Toileting (which includes using toilet bed pan or urinal) 2  -CJ  --  --    Putting on and taking off regular upper body clothing 2  -CJ  --  --    Taking care of personal grooming (such as brushing teeth) 2  -CJ  --  --    Eating meals 2  -CJ  --  --    Score 12  -CJ  --  --       Functional Assessment    Outcome Measure Options AM-PAC 6 Clicks Daily Activity (OT)  -  -- AM-[facility] 6 Clicks Basic Mobility (PT)  -    Row Name 04/16/18 1342             How much help from another is currently needed...    Putting on and taking off regular lower body clothing? 2  -CJ      Bathing (including washing, rinsing, and drying) 2  -CJ      Toileting (which includes using toilet bed pan or urinal) 2  -CJ      Putting on and taking off regular upper body clothing 2  -CJ      Taking care of personal grooming (such as brushing teeth) 2  -CJ      Eating meals 2  -CJ      Score 12  -         Functional Assessment    Outcome Measure Options AM-PAC 6 Clicks Daily Activity (OT)  -        User Key  (r) = Recorded By, (t) = Taken By, (c) = Cosigned By    Initials Name Provider Type     Chapin Combs PTA Physical Therapy Assistant    TAMIA Randall Occupational Therapy Assistant     ROMERO Russo, PTA Physical Therapy Assistant              OT Discharge Summary  Reason for Discharge: Discharge from facility  Outcomes Achieved: Refer to plan of care for updates on goals achieved  Discharge Destination: [facility]      TAMIA Yarbrough  4/19/2018

## 2018-08-01 PROBLEM — J44.1 CHRONIC OBSTRUCTIVE ASTHMA WITH ACUTE EXACERBATION (HCC): Status: ACTIVE | Noted: 2018-01-01

## 2018-08-01 PROBLEM — J45.901 CHRONIC OBSTRUCTIVE ASTHMA WITH ACUTE EXACERBATION (HCC): Status: ACTIVE | Noted: 2018-01-01

## 2018-08-21 ENCOUNTER — TELEPHONE (OUTPATIENT)
Dept: INTERNAL MEDICINE | Age: 83
End: 2018-08-21